# Patient Record
Sex: FEMALE | Race: WHITE | NOT HISPANIC OR LATINO | Employment: UNEMPLOYED | URBAN - METROPOLITAN AREA
[De-identification: names, ages, dates, MRNs, and addresses within clinical notes are randomized per-mention and may not be internally consistent; named-entity substitution may affect disease eponyms.]

---

## 2021-02-03 ENCOUNTER — TELEPHONE (OUTPATIENT)
Dept: FAMILY MEDICINE | Facility: CLINIC | Age: 61
End: 2021-02-03

## 2021-02-03 ENCOUNTER — OFFICE VISIT (OUTPATIENT)
Dept: FAMILY MEDICINE | Facility: CLINIC | Age: 61
End: 2021-02-03

## 2021-02-03 VITALS
TEMPERATURE: 96 F | SYSTOLIC BLOOD PRESSURE: 116 MMHG | WEIGHT: 127.38 LBS | DIASTOLIC BLOOD PRESSURE: 68 MMHG | HEIGHT: 63 IN | HEART RATE: 56 BPM | RESPIRATION RATE: 14 BRPM | OXYGEN SATURATION: 93 % | BODY MASS INDEX: 22.57 KG/M2

## 2021-02-03 DIAGNOSIS — D58.0 SPHEROCYTOSIS, HEREDITARY: ICD-10-CM

## 2021-02-03 DIAGNOSIS — F41.1 GENERALIZED ANXIETY DISORDER: ICD-10-CM

## 2021-02-03 DIAGNOSIS — E11.40 TYPE 2 DIABETES MELLITUS WITH DIABETIC NEUROPATHY, WITHOUT LONG-TERM CURRENT USE OF INSULIN: Primary | ICD-10-CM

## 2021-02-03 LAB
HBA1C MFR BLD: 5 % (ref 4–6)
HBA1C MFR BLD: NORMAL %

## 2021-02-03 PROCEDURE — 83036 HEMOGLOBIN GLYCOSYLATED A1C: CPT | Mod: QW,,, | Performed by: NURSE PRACTITIONER

## 2021-02-03 PROCEDURE — 82570 ASSAY OF URINE CREATININE: CPT

## 2021-02-03 PROCEDURE — 99204 OFFICE O/P NEW MOD 45 MIN: CPT | Mod: S$GLB,,, | Performed by: NURSE PRACTITIONER

## 2021-02-03 PROCEDURE — 82043 UR ALBUMIN QUANTITATIVE: CPT

## 2021-02-03 PROCEDURE — 99204 PR OFFICE/OUTPT VISIT, NEW, LEVL IV, 45-59 MIN: ICD-10-PCS | Mod: S$GLB,,, | Performed by: NURSE PRACTITIONER

## 2021-02-03 PROCEDURE — 83036 POCT HEMOGLOBIN A1C: ICD-10-PCS | Mod: QW,,, | Performed by: NURSE PRACTITIONER

## 2021-02-03 RX ORDER — SERTRALINE HYDROCHLORIDE 50 MG/1
50 TABLET, FILM COATED ORAL DAILY
Qty: 30 TABLET | Refills: 1 | Status: SHIPPED | OUTPATIENT
Start: 2021-02-03 | End: 2021-03-03 | Stop reason: SDUPTHER

## 2021-02-03 RX ORDER — METFORMIN HYDROCHLORIDE EXTENDED-RELEASE TABLETS 500 MG/1
TABLET, FILM COATED, EXTENDED RELEASE ORAL
Qty: 60 TABLET | Refills: 11 | Status: SHIPPED | OUTPATIENT
Start: 2021-02-03 | End: 2021-09-29

## 2021-02-04 ENCOUNTER — TELEPHONE (OUTPATIENT)
Dept: FAMILY MEDICINE | Facility: CLINIC | Age: 61
End: 2021-02-04

## 2021-02-04 LAB
ALBUMIN/CREAT UR: 36.4 UG/MG (ref 0–30)
CREAT UR-MCNC: 110 MG/DL (ref 15–325)
MICROALBUMIN UR DL<=1MG/L-MCNC: 40 UG/ML

## 2021-02-05 ENCOUNTER — LAB VISIT (OUTPATIENT)
Dept: LAB | Facility: CLINIC | Age: 61
End: 2021-02-05

## 2021-02-05 ENCOUNTER — APPOINTMENT (OUTPATIENT)
Dept: LAB | Facility: HOSPITAL | Age: 61
End: 2021-02-05
Attending: NURSE PRACTITIONER

## 2021-02-05 DIAGNOSIS — D58.0 SPHEROCYTOSIS, HEREDITARY: ICD-10-CM

## 2021-02-05 DIAGNOSIS — Z12.11 COLON CANCER SCREENING: ICD-10-CM

## 2021-02-05 LAB
BASOPHILS # BLD AUTO: 0.02 K/UL (ref 0–0.2)
BASOPHILS NFR BLD: 0.4 % (ref 0–1.9)
DIFFERENTIAL METHOD: ABNORMAL
EOSINOPHIL # BLD AUTO: 0 K/UL (ref 0–0.5)
EOSINOPHIL NFR BLD: 0.7 % (ref 0–8)
ERYTHROCYTE [DISTWIDTH] IN BLOOD BY AUTOMATED COUNT: 19.7 % (ref 11.5–14.5)
HCT VFR BLD AUTO: 34.7 % (ref 37–48.5)
HGB BLD-MCNC: 11.5 G/DL (ref 12–16)
IMM GRANULOCYTES # BLD AUTO: 0.04 K/UL (ref 0–0.04)
IMM GRANULOCYTES NFR BLD AUTO: 0.9 % (ref 0–0.5)
LYMPHOCYTES # BLD AUTO: 0.7 K/UL (ref 1–4.8)
LYMPHOCYTES NFR BLD: 14.7 % (ref 18–48)
MCH RBC QN AUTO: 29.4 PG (ref 27–31)
MCHC RBC AUTO-ENTMCNC: 33.1 G/DL (ref 32–36)
MCV RBC AUTO: 89 FL (ref 82–98)
MONOCYTES # BLD AUTO: 0.2 K/UL (ref 0.3–1)
MONOCYTES NFR BLD: 4.8 % (ref 4–15)
NEUTROPHILS # BLD AUTO: 3.6 K/UL (ref 1.8–7.7)
NEUTROPHILS NFR BLD: 78.5 % (ref 38–73)
NRBC BLD-RTO: 0 /100 WBC
PLATELET # BLD AUTO: 210 K/UL (ref 150–350)
PMV BLD AUTO: 11.9 FL (ref 9.2–12.9)
RBC # BLD AUTO: 3.91 M/UL (ref 4–5.4)
WBC # BLD AUTO: 4.56 K/UL (ref 3.9–12.7)

## 2021-02-05 PROCEDURE — 83540 ASSAY OF IRON: CPT

## 2021-02-05 PROCEDURE — 85025 COMPLETE CBC W/AUTO DIFF WBC: CPT

## 2021-02-06 LAB
IRON SERPL-MCNC: 98 UG/DL (ref 30–160)
SATURATED IRON: 37 % (ref 20–50)
TOTAL IRON BINDING CAPACITY: 262 UG/DL (ref 250–450)
TRANSFERRIN SERPL-MCNC: 177 MG/DL (ref 200–375)

## 2021-02-11 DIAGNOSIS — Z12.11 COLON CANCER SCREENING: ICD-10-CM

## 2021-02-15 ENCOUNTER — TELEPHONE (OUTPATIENT)
Dept: FAMILY MEDICINE | Facility: CLINIC | Age: 61
End: 2021-02-15

## 2021-02-24 ENCOUNTER — TELEPHONE (OUTPATIENT)
Dept: FAMILY MEDICINE | Facility: CLINIC | Age: 61
End: 2021-02-24

## 2021-02-25 ENCOUNTER — TELEPHONE (OUTPATIENT)
Dept: FAMILY MEDICINE | Facility: CLINIC | Age: 61
End: 2021-02-25

## 2021-03-03 ENCOUNTER — OFFICE VISIT (OUTPATIENT)
Dept: FAMILY MEDICINE | Facility: CLINIC | Age: 61
End: 2021-03-03

## 2021-03-03 VITALS
OXYGEN SATURATION: 98 % | WEIGHT: 123.19 LBS | TEMPERATURE: 98 F | HEIGHT: 64 IN | SYSTOLIC BLOOD PRESSURE: 140 MMHG | DIASTOLIC BLOOD PRESSURE: 82 MMHG | BODY MASS INDEX: 21.03 KG/M2 | HEART RATE: 117 BPM

## 2021-03-03 DIAGNOSIS — E11.40 TYPE 2 DIABETES MELLITUS WITH DIABETIC NEUROPATHY, WITHOUT LONG-TERM CURRENT USE OF INSULIN: Primary | ICD-10-CM

## 2021-03-03 DIAGNOSIS — F41.1 GENERALIZED ANXIETY DISORDER: ICD-10-CM

## 2021-03-03 LAB
CHOLEST SERPL-MSCNC: 214 MG/DL (ref 0–200)
HBA1C MFR BLD: 4 % (ref 4–6)
HDLC SERPL-MCNC: 45 MG/DL
LDLC SERPL CALC-MCNC: 142 MG/DL (ref 0–160)
MICROALBUMIN/CREATININE RATIO: 24 UG/MG
TRIGLYCERIDE (LIPID PAN): 143

## 2021-03-03 PROCEDURE — 99214 OFFICE O/P EST MOD 30 MIN: CPT | Mod: S$GLB,,, | Performed by: NURSE PRACTITIONER

## 2021-03-03 PROCEDURE — 99214 PR OFFICE/OUTPT VISIT, EST, LEVL IV, 30-39 MIN: ICD-10-PCS | Mod: S$GLB,,, | Performed by: NURSE PRACTITIONER

## 2021-03-03 RX ORDER — SERTRALINE HYDROCHLORIDE 100 MG/1
100 TABLET, FILM COATED ORAL DAILY
Qty: 30 TABLET | Refills: 5 | Status: SHIPPED | OUTPATIENT
Start: 2021-03-03 | End: 2021-05-24 | Stop reason: SDUPTHER

## 2021-03-16 ENCOUNTER — PATIENT OUTREACH (OUTPATIENT)
Dept: ADMINISTRATIVE | Facility: HOSPITAL | Age: 61
End: 2021-03-16

## 2021-03-29 ENCOUNTER — TELEPHONE (OUTPATIENT)
Dept: FAMILY MEDICINE | Facility: CLINIC | Age: 61
End: 2021-03-29

## 2021-03-29 DIAGNOSIS — R14.0 ABDOMINAL DISTENTION: Primary | ICD-10-CM

## 2021-03-30 ENCOUNTER — TELEPHONE (OUTPATIENT)
Dept: FAMILY MEDICINE | Facility: CLINIC | Age: 61
End: 2021-03-30

## 2021-04-01 DIAGNOSIS — Z12.11 COLON CANCER SCREENING: ICD-10-CM

## 2021-04-05 ENCOUNTER — TELEPHONE (OUTPATIENT)
Dept: FAMILY MEDICINE | Facility: CLINIC | Age: 61
End: 2021-04-05

## 2021-05-24 ENCOUNTER — TELEPHONE (OUTPATIENT)
Dept: FAMILY MEDICINE | Facility: CLINIC | Age: 61
End: 2021-05-24

## 2021-05-24 DIAGNOSIS — F41.1 GENERALIZED ANXIETY DISORDER: ICD-10-CM

## 2021-05-24 RX ORDER — SERTRALINE HYDROCHLORIDE 50 MG/1
TABLET, FILM COATED ORAL
Qty: 30 TABLET | Refills: 0 | Status: SHIPPED | OUTPATIENT
Start: 2021-05-24 | End: 2021-09-29

## 2021-05-25 ENCOUNTER — TELEPHONE (OUTPATIENT)
Dept: FAMILY MEDICINE | Facility: CLINIC | Age: 61
End: 2021-05-25

## 2021-08-16 ENCOUNTER — PATIENT OUTREACH (OUTPATIENT)
Dept: ADMINISTRATIVE | Facility: HOSPITAL | Age: 61
End: 2021-08-16

## 2021-09-27 DIAGNOSIS — F41.1 GENERALIZED ANXIETY DISORDER: ICD-10-CM

## 2021-09-28 RX ORDER — SERTRALINE HYDROCHLORIDE 50 MG/1
50 TABLET, FILM COATED ORAL DAILY
Qty: 30 TABLET | Refills: 0 | OUTPATIENT
Start: 2021-09-28

## 2021-09-29 ENCOUNTER — OFFICE VISIT (OUTPATIENT)
Dept: FAMILY MEDICINE | Facility: CLINIC | Age: 61
End: 2021-09-29

## 2021-09-29 ENCOUNTER — TELEPHONE (OUTPATIENT)
Dept: FAMILY MEDICINE | Facility: CLINIC | Age: 61
End: 2021-09-29

## 2021-09-29 DIAGNOSIS — F41.1 GENERALIZED ANXIETY DISORDER: ICD-10-CM

## 2021-09-29 DIAGNOSIS — Z79.4 TYPE 2 DIABETES MELLITUS WITH DIABETIC NEUROPATHY, WITH LONG-TERM CURRENT USE OF INSULIN: ICD-10-CM

## 2021-09-29 DIAGNOSIS — E11.40 TYPE 2 DIABETES MELLITUS WITH DIABETIC NEUROPATHY, WITH LONG-TERM CURRENT USE OF INSULIN: ICD-10-CM

## 2021-09-29 PROCEDURE — 99213 OFFICE O/P EST LOW 20 MIN: CPT | Mod: 95,,, | Performed by: STUDENT IN AN ORGANIZED HEALTH CARE EDUCATION/TRAINING PROGRAM

## 2021-09-29 PROCEDURE — 99213 PR OFFICE/OUTPT VISIT, EST, LEVL III, 20-29 MIN: ICD-10-PCS | Mod: 95,,, | Performed by: STUDENT IN AN ORGANIZED HEALTH CARE EDUCATION/TRAINING PROGRAM

## 2021-09-29 RX ORDER — INSULIN ASPART 100 [IU]/ML
2 INJECTION, SOLUTION INTRAVENOUS; SUBCUTANEOUS 2 TIMES DAILY WITH MEALS
COMMUNITY
Start: 2021-04-23

## 2021-09-29 RX ORDER — INSULIN DEGLUDEC 100 U/ML
14 INJECTION, SOLUTION SUBCUTANEOUS NIGHTLY
COMMUNITY
Start: 2021-08-24

## 2021-09-29 RX ORDER — SERTRALINE HYDROCHLORIDE 100 MG/1
TABLET, FILM COATED ORAL
Qty: 90 TABLET | Refills: 3 | Status: SHIPPED | OUTPATIENT
Start: 2021-09-29 | End: 2021-09-30 | Stop reason: SDUPTHER

## 2021-09-29 RX ORDER — METFORMIN HYDROCHLORIDE EXTENDED-RELEASE TABLETS 500 MG/1
TABLET, FILM COATED, EXTENDED RELEASE ORAL
Start: 2021-09-29 | End: 2021-12-27 | Stop reason: SDUPTHER

## 2021-09-30 ENCOUNTER — TELEPHONE (OUTPATIENT)
Dept: FAMILY MEDICINE | Facility: CLINIC | Age: 61
End: 2021-09-30

## 2021-09-30 DIAGNOSIS — F41.1 GENERALIZED ANXIETY DISORDER: ICD-10-CM

## 2021-09-30 RX ORDER — SERTRALINE HYDROCHLORIDE 100 MG/1
100 TABLET, FILM COATED ORAL DAILY
Qty: 90 TABLET | Refills: 3 | Status: SHIPPED | OUTPATIENT
Start: 2021-09-30 | End: 2022-01-13 | Stop reason: SDUPTHER

## 2021-10-07 ENCOUNTER — PATIENT MESSAGE (OUTPATIENT)
Dept: ADMINISTRATIVE | Facility: HOSPITAL | Age: 61
End: 2021-10-07

## 2021-10-07 DIAGNOSIS — E11.9 TYPE 2 DIABETES MELLITUS WITHOUT COMPLICATION: ICD-10-CM

## 2021-10-07 DIAGNOSIS — E11.9 TYPE 2 DIABETES MELLITUS WITHOUT COMPLICATION, UNSPECIFIED WHETHER LONG TERM INSULIN USE: ICD-10-CM

## 2021-12-27 DIAGNOSIS — E11.40 TYPE 2 DIABETES MELLITUS WITH DIABETIC NEUROPATHY, WITH LONG-TERM CURRENT USE OF INSULIN: ICD-10-CM

## 2021-12-27 DIAGNOSIS — Z79.4 TYPE 2 DIABETES MELLITUS WITH DIABETIC NEUROPATHY, WITH LONG-TERM CURRENT USE OF INSULIN: ICD-10-CM

## 2021-12-28 ENCOUNTER — TELEPHONE (OUTPATIENT)
Dept: FAMILY MEDICINE | Facility: CLINIC | Age: 61
End: 2021-12-28

## 2021-12-28 RX ORDER — METFORMIN HYDROCHLORIDE EXTENDED-RELEASE TABLETS 500 MG/1
500 TABLET, FILM COATED, EXTENDED RELEASE ORAL 2 TIMES DAILY
Qty: 60 TABLET | Refills: 5 | Status: SHIPPED | OUTPATIENT
Start: 2021-12-28

## 2021-12-29 ENCOUNTER — PATIENT MESSAGE (OUTPATIENT)
Dept: ADMINISTRATIVE | Facility: HOSPITAL | Age: 61
End: 2021-12-29

## 2021-12-29 ENCOUNTER — PATIENT OUTREACH (OUTPATIENT)
Dept: ADMINISTRATIVE | Facility: HOSPITAL | Age: 61
End: 2021-12-29

## 2022-01-06 ENCOUNTER — PATIENT OUTREACH (OUTPATIENT)
Dept: ADMINISTRATIVE | Facility: HOSPITAL | Age: 62
End: 2022-01-06

## 2022-01-06 NOTE — PROGRESS NOTES
"Attempted to outreach patient for diabetic eye exam via "[employer]", no answer after a week. Sending outreach via Mail Out Letter now.  "

## 2022-01-06 NOTE — LETTER
January 6, 2022    Nell Smith  [address] [place] [address]             [facility]  [address]  [facility] [address]  Phone: [phone] Hi,  I hope you are well. According to our records you are due for a diabetic eye exam. If you have had this done elsewhere, will you please let us know so that we may request a copy of your report and update your record here at [facility]. If you have not, will you please give us a call at [phone] and we will be happy to help you get this scheduled.      Thanks and have a good day!      Please don't hesitate to call with questions or concerns.     Eva Childress LPN CCC [facility] [place] [address] [place] [address]  [phone] (phone)  [phone] (fax)

## 2022-01-13 DIAGNOSIS — F41.1 GENERALIZED ANXIETY DISORDER: ICD-10-CM

## 2022-01-13 RX ORDER — SERTRALINE HYDROCHLORIDE 100 MG/1
100 TABLET, FILM COATED ORAL DAILY
Qty: 90 TABLET | Refills: 3 | Status: SHIPPED | OUTPATIENT
Start: 2022-01-13 | End: 2022-11-16

## 2022-01-24 ENCOUNTER — TELEPHONE (OUTPATIENT)
Dept: FAMILY MEDICINE | Facility: CLINIC | Age: 62
End: 2022-01-24

## 2022-01-24 NOTE — TELEPHONE ENCOUNTER
----- Message from Ela Dumont sent at 1/24/2022  3:38 PM CST -----  Contact: pt  Type:  RX Refill Request    Who Called:  pt    Refill or New Rx:  refill    RX Name and Strength:  sertraline (ZOLOFT) 50 MG tablet    How is the patient currently taking it? (ex. 1XDay):  As Directed  Is this a 30 day or 90 day RX:  30      Preferred Pharmacy with phone number:    [id number] - [place], [place] - [address] AT [facility] [address]  [address]  [place] [address]  Phone: [phone] Fax: [phone]    Local or Mail Order:  local    Ordering Provider:  Marcia Colin Call Back Number:  [phone]    Additional Information:Pt states she typically get 100mg would prefer 50. She's trying to wean herself off      Please contact pt upon completion-Thank you~

## 2022-01-24 NOTE — TELEPHONE ENCOUNTER
Consulted with Dr. Kennedy, who feels it would be best for patient to at least complete virtual visit with a provider to further discuss this request.     Called patient to discuss - no answer; VM full. Will reach out via [employer] as well.

## 2022-02-16 DIAGNOSIS — Z12.31 OTHER SCREENING MAMMOGRAM: ICD-10-CM

## 2022-03-16 DIAGNOSIS — E11.9 TYPE 2 DIABETES MELLITUS WITHOUT COMPLICATION: ICD-10-CM

## 2022-03-21 ENCOUNTER — PATIENT MESSAGE (OUTPATIENT)
Dept: ADMINISTRATIVE | Facility: HOSPITAL | Age: 62
End: 2022-03-21

## 2022-05-08 LAB
ALBUMIN CREATININE RATIO: 17 MG/G
ALBUMIN, URINE: 1.5 MG/L
CREATININE, URINE: 86 MG/DL

## 2022-05-11 DIAGNOSIS — E11.9 TYPE 2 DIABETES MELLITUS WITHOUT COMPLICATION: ICD-10-CM

## 2022-05-16 ENCOUNTER — PATIENT MESSAGE (OUTPATIENT)
Dept: ADMINISTRATIVE | Facility: HOSPITAL | Age: 62
End: 2022-05-16

## 2022-05-31 ENCOUNTER — PATIENT MESSAGE (OUTPATIENT)
Dept: ADMINISTRATIVE | Facility: HOSPITAL | Age: 62
End: 2022-05-31

## 2022-06-10 ENCOUNTER — TELEPHONE (OUTPATIENT)
Dept: FAMILY MEDICINE | Facility: CLINIC | Age: 62
End: 2022-06-10

## 2022-07-22 ENCOUNTER — PATIENT OUTREACH (OUTPATIENT)
Dept: ADMINISTRATIVE | Facility: HOSPITAL | Age: 62
End: 2022-07-22

## 2022-07-22 NOTE — PROGRESS NOTES
"A1C gap report review.    Spoke to patient regarding overdue HM    Health Maintenance Due   Topic Date Due    Hepatitis C Screening  Never done    Cervical Cancer Screening  Never done    COVID-19 Vaccine (1) Never done    Pneumococcal Vaccines (Age 0-64) (1 - PCV) Never done    Foot Exam  Never done    Eye Exam  Never done    HIV Screening  Never done    TETANUS VACCINE  Never done    Low Dose Statin  Never done    Shingles Vaccine (1 of 2) Never done    Hemoglobin A1c  09/03/2021    Mammogram  02/03/2022    Diabetes Urine Screening  03/03/2022    Lipid Panel  03/03/2022     Patient states not interested in HM items and declined scheduling. Patient states " I see doctors in [place]. States "saw NP Rob with [facility] who is no longer there" and "saw Dr. Kennedy once on skype". When asked patient did not clarify if no longer an [facility] patient. Patient was pleasant and thanked me for calling prior to call ending.   "

## 2022-07-27 DIAGNOSIS — Z12.11 COLON CANCER SCREENING: ICD-10-CM

## 2022-08-01 ENCOUNTER — PATIENT MESSAGE (OUTPATIENT)
Dept: ADMINISTRATIVE | Facility: HOSPITAL | Age: 62
End: 2022-08-01

## 2022-08-17 DIAGNOSIS — E11.40 TYPE 2 DIABETES MELLITUS WITH DIABETIC NEUROPATHY, WITH LONG-TERM CURRENT USE OF INSULIN: ICD-10-CM

## 2022-08-17 DIAGNOSIS — Z79.4 TYPE 2 DIABETES MELLITUS WITH DIABETIC NEUROPATHY, WITH LONG-TERM CURRENT USE OF INSULIN: ICD-10-CM

## 2022-08-22 ENCOUNTER — PATIENT MESSAGE (OUTPATIENT)
Dept: ADMINISTRATIVE | Facility: HOSPITAL | Age: 62
End: 2022-08-22

## 2022-08-24 ENCOUNTER — PATIENT MESSAGE (OUTPATIENT)
Dept: ADMINISTRATIVE | Facility: HOSPITAL | Age: 62
End: 2022-08-24

## 2022-08-25 LAB
CHOLEST SERPL-MSCNC: 179 MG/DL (ref 0–200)
HBA1C MFR BLD: 4 % (ref 4–6)
HDLC SERPL-MCNC: 54 MG/DL
LDLC SERPL CALC-MCNC: 105 MG/DL
TRIGL SERPL-MCNC: 100 MG/DL

## 2022-08-31 DIAGNOSIS — Z11.59 NEED FOR HEPATITIS C SCREENING TEST: ICD-10-CM

## 2022-09-15 ENCOUNTER — PATIENT MESSAGE (OUTPATIENT)
Dept: ADMINISTRATIVE | Facility: HOSPITAL | Age: 62
End: 2022-09-15

## 2022-09-29 ENCOUNTER — PATIENT OUTREACH (OUTPATIENT)
Dept: ADMINISTRATIVE | Facility: HOSPITAL | Age: 62
End: 2022-09-29

## 2022-09-29 NOTE — PROGRESS NOTES
Working A1c report for [place], chart reviewed at this time.  Population Health Outreach.  Spoke with pt about DM care & annual PCP visit. She stated that she doesn't really use [facility] of PCP needs, only wanted a prescription filled. Very reluctant to give up endocrinologist name and refuse to give up any other information. She stated that Dr Rosa takes care of her DM needs.     Records Received, hyper-linked into chart at this time.   The following record(s)  below were uploaded for Health Maintenance .    08/25/2022  HEMOGLOBIN A1c  LIPID PANEL    05/08/2022  MICROALBUMIN

## 2022-10-11 ENCOUNTER — PATIENT MESSAGE (OUTPATIENT)
Dept: ADMINISTRATIVE | Facility: HOSPITAL | Age: 62
End: 2022-10-11

## 2022-10-17 ENCOUNTER — PATIENT MESSAGE (OUTPATIENT)
Dept: ADMINISTRATIVE | Facility: HOSPITAL | Age: 62
End: 2022-10-17

## 2022-10-31 ENCOUNTER — PATIENT MESSAGE (OUTPATIENT)
Dept: FAMILY MEDICINE | Facility: CLINIC | Age: 62
End: 2022-10-31

## 2022-11-16 ENCOUNTER — OFFICE VISIT (OUTPATIENT)
Dept: FAMILY MEDICINE | Facility: CLINIC | Age: 62
End: 2022-11-16

## 2022-11-16 DIAGNOSIS — F41.1 GENERALIZED ANXIETY DISORDER: Primary | ICD-10-CM

## 2022-11-16 DIAGNOSIS — F33.1 MODERATE EPISODE OF RECURRENT MAJOR DEPRESSIVE DISORDER: ICD-10-CM

## 2022-11-16 DIAGNOSIS — F43.10 PTSD (POST-TRAUMATIC STRESS DISORDER): ICD-10-CM

## 2022-11-16 PROCEDURE — 99213 OFFICE O/P EST LOW 20 MIN: CPT | Mod: 95,,, | Performed by: STUDENT IN AN ORGANIZED HEALTH CARE EDUCATION/TRAINING PROGRAM

## 2022-11-16 PROCEDURE — 99213 PR OFFICE/OUTPT VISIT, EST, LEVL III, 20-29 MIN: ICD-10-PCS | Mod: 95,,, | Performed by: STUDENT IN AN ORGANIZED HEALTH CARE EDUCATION/TRAINING PROGRAM

## 2022-11-16 RX ORDER — BUPROPION HYDROCHLORIDE 75 MG/1
TABLET ORAL
Qty: 67 TABLET | Refills: 0 | Status: SHIPPED | OUTPATIENT
Start: 2022-11-16 | End: 2024-04-03

## 2022-11-16 NOTE — PATIENT INSTRUCTIONS

## 2022-11-16 NOTE — PROGRESS NOTES
The patient location is: [facility]  The chief complaint leading to consultation is: Anxiety, depression    Visit type: audiovisual    Face to Face time with patient: 17 mins  18 minutes of total time spent on the encounter, which includes face to face time and non-face to face time preparing to see the patient (eg, review of tests), Obtaining and/or reviewing separately obtained history, Documenting clinical information in the electronic or other health record, Independently interpreting results (not separately reported) and communicating results to the patient/family/caregiver, or Care coordination (not separately reported).     Each patient to whom he or she provides medical services by telemedicine is:  (1) informed of the relationship between the physician and patient and the respective role of any other health care provider with respect to management of the patient; and (2) notified that he or she may decline to receive medical services by telemedicine and may withdraw from such care at any time.    Subjective:       Patient ID: Nell Smith is a 61 y.o. female.    Chief Complaint: Depression      Anxiety/Depression/PTSD-  2 years of worsening anxiety, fear and feeling post traumatic  She is fearful of being trapped in the home.  She feels fearful in the home because she feels triggered at home  They are painting in the home to help make it look different in hopes of less triggering  She is not getting any results from zoloft and has stopped that in Jan 2022.  She is taking levium, she is taking serotonin supplements, she is taking cbd gummies.  She is getting off of her levium and her serotonin supplements to see what her wellbutrin will do by itself.  She plans to continue cbd gummies.     Review of Systems   Constitutional:  Negative for activity change and unexpected weight change.   HENT:  Negative for hearing loss, rhinorrhea and trouble swallowing.    Eyes:  Negative for discharge and visual  disturbance.   Respiratory:  Negative for chest tightness and wheezing.    Cardiovascular:  Negative for chest pain and palpitations.   Gastrointestinal:  Negative for blood in stool, constipation, diarrhea and vomiting.   Endocrine: Negative for polydipsia and polyuria.   Genitourinary:  Negative for difficulty urinating, dysuria, hematuria and menstrual problem.   Musculoskeletal:  Negative for arthralgias, joint swelling and neck pain.   Neurological:  Negative for weakness and headaches.   Psychiatric/Behavioral:  Positive for dysphoric mood and sleep disturbance. Negative for confusion. The patient is nervous/anxious.        Objective:      Physical Exam  Constitutional:       General: She is not in acute distress.     Appearance: Normal appearance. She is not ill-appearing.   Pulmonary:      Effort: Pulmonary effort is normal. No respiratory distress.   Neurological:      Mental Status: She is alert.   Psychiatric:         Mood and Affect: Mood normal.         Behavior: Behavior normal.         Thought Content: Thought content normal.         Judgment: Judgment normal.       Assessment:       1. Generalized anxiety disorder    2. Moderate episode of recurrent major depressive disorder    3. PTSD (post-traumatic stress disorder)        Plan:       Problem List Items Addressed This Visit          Psychiatric    Generalized anxiety disorder - Primary     Gets some flairs and has some ptsd components  She has gotten off of the zoloft because it didn't help  She would like to try wellbutrin.          Relevant Medications    buPROPion (WELLBUTRIN) 75 MG tablet     Other Visit Diagnoses       Moderate episode of recurrent major depressive disorder        Relevant Medications    buPROPion (WELLBUTRIN) 75 MG tablet    PTSD (post-traumatic stress disorder)        Relevant Medications    buPROPion (WELLBUTRIN) 75 MG tablet

## 2022-11-16 NOTE — ASSESSMENT & PLAN NOTE
Gets some flairs and has some ptsd components  She has gotten off of the zoloft because it didn't help  She would like to try wellbutrin.

## 2022-12-12 ENCOUNTER — TELEPHONE (OUTPATIENT)
Dept: FAMILY MEDICINE | Facility: CLINIC | Age: 62
End: 2022-12-12

## 2022-12-12 DIAGNOSIS — F41.1 GENERALIZED ANXIETY DISORDER: Primary | ICD-10-CM

## 2022-12-12 NOTE — TELEPHONE ENCOUNTER
----- Message from Rene Corley sent at 12/12/2022 12:35 PM CST -----  Contact: Self  Type: Needs Medical Advice  Who Called:  Patient    Best Call Back Number: [phone]  Additional Information:  Called to speak with office regarding buPROPion (WELLBUTRIN) 75 MG tablet. States it is affecting blood sugar, would like to discuss an alternative or getting off of above.

## 2022-12-12 NOTE — TELEPHONE ENCOUNTER
States she took it in the past, it did well for her but then took it about a year ago and it did not help

## 2022-12-12 NOTE — TELEPHONE ENCOUNTER
Spoke with patient. Patient states   She took wellbutrin 75 mg for 10 days  Constipation after a few days.   Increase in Blood sugar.  Per patient in the past has taken  Zoloft before and did not cause these issues.

## 2022-12-13 RX ORDER — SERTRALINE HYDROCHLORIDE 25 MG/1
25 TABLET, FILM COATED ORAL DAILY
Qty: 30 TABLET | Refills: 11 | Status: SHIPPED | OUTPATIENT
Start: 2022-12-13 | End: 2022-12-13 | Stop reason: SDUPTHER

## 2022-12-13 NOTE — TELEPHONE ENCOUNTER
Spoke with Nell. Nell informed to take Wellbutrin every other day for 5 days then stop  Once Wellbutrin is stopped, she may start Zoloft. Pt voiced understanding.

## 2022-12-13 NOTE — TELEPHONE ENCOUNTER
Spoke with patient. Pt would like the low dose of Zoloft sent in.   Pt would like to know if there is a weaning phase for Wellbutrin.

## 2023-01-09 ENCOUNTER — PATIENT MESSAGE (OUTPATIENT)
Dept: ADMINISTRATIVE | Facility: HOSPITAL | Age: 63
End: 2023-01-09

## 2023-01-10 ENCOUNTER — PATIENT MESSAGE (OUTPATIENT)
Dept: ADMINISTRATIVE | Facility: HOSPITAL | Age: 63
End: 2023-01-10

## 2023-01-17 ENCOUNTER — PATIENT MESSAGE (OUTPATIENT)
Dept: ADMINISTRATIVE | Facility: HOSPITAL | Age: 63
End: 2023-01-17

## 2023-02-09 LAB
CHOLEST SERPL-MSCNC: 162 MG/DL (ref 0–200)
HDLC SERPL-MCNC: 47 MG/DL
LDLC SERPL CALC-MCNC: 88 MG/DL
TRIGL SERPL-MCNC: 176 MG/DL

## 2023-02-24 ENCOUNTER — PATIENT MESSAGE (OUTPATIENT)
Dept: ADMINISTRATIVE | Facility: HOSPITAL | Age: 63
End: 2023-02-24

## 2023-03-01 ENCOUNTER — TELEPHONE (OUTPATIENT)
Dept: FAMILY MEDICINE | Facility: CLINIC | Age: 63
End: 2023-03-01

## 2023-03-01 NOTE — TELEPHONE ENCOUNTER
----- Message from Val Rodrigues sent at 3/1/2023 10:17 AM CST -----  Contact: pt  Type: Needs Medical Advice         Who Called: pt  Best Call Back Number:[phone]  Additional Information: Requesting a call back regarding t is needing office to call her. Pt is upset that her rx keeps getting called in incorrectly and she said it is coasting her money.   Please Advise- Thank you

## 2023-03-01 NOTE — TELEPHONE ENCOUNTER
Attempted to contact Nell Smith to discuss  RX .    Left voice mail to return our call at [phone] on [phone]    My Grey LPN

## 2023-03-06 ENCOUNTER — TELEPHONE (OUTPATIENT)
Dept: FAMILY MEDICINE | Facility: CLINIC | Age: 63
End: 2023-03-06

## 2023-03-06 DIAGNOSIS — F41.1 GENERALIZED ANXIETY DISORDER: Primary | ICD-10-CM

## 2023-03-06 RX ORDER — SERTRALINE HYDROCHLORIDE 100 MG/1
100 TABLET, FILM COATED ORAL DAILY
Qty: 90 TABLET | Refills: 3 | Status: SHIPPED | OUTPATIENT
Start: 2023-03-06 | End: 2024-03-05

## 2023-03-06 NOTE — TELEPHONE ENCOUNTER
----- Message from Mora Quinteros sent at 3/6/2023  9:09 AM CST -----  Contact: patient  Type: Needs Medical Advice  Who Called:  patient  Best Call Back Number: [phone]   Additional Information: patient requesting a call back regarding prescription- medication was supposed to be upped sertraline (ZOLOFT) 50 MG tablet supposed to be upped to 100mg - please advise

## 2023-03-06 NOTE — TELEPHONE ENCOUNTER
"Per patient, "medication was supposed to be upped from sertraline (ZOLOFT) 50 MG tablet to 100mg" Please advise as last RX sent in as 50 mg once daily.   "

## 2023-03-07 ENCOUNTER — PATIENT MESSAGE (OUTPATIENT)
Dept: ADMINISTRATIVE | Facility: HOSPITAL | Age: 63
End: 2023-03-07

## 2023-03-20 ENCOUNTER — PATIENT OUTREACH (OUTPATIENT)
Dept: ADMINISTRATIVE | Facility: HOSPITAL | Age: 63
End: 2023-03-20

## 2023-03-20 NOTE — PROGRESS NOTES
Population Health chart review. Working a non-compliant Cervical Cancer Screening report. Records Received, hyper-linked into chart at this time.  The following record(s)  below were uploaded for Health Maintenance . Population Health Outreach.    02/2023  LIPID PANEL

## 2023-04-11 ENCOUNTER — PATIENT MESSAGE (OUTPATIENT)
Dept: ADMINISTRATIVE | Facility: HOSPITAL | Age: 63
End: 2023-04-11

## 2023-06-27 ENCOUNTER — PATIENT MESSAGE (OUTPATIENT)
Dept: FAMILY MEDICINE | Facility: CLINIC | Age: 63
End: 2023-06-27

## 2023-07-03 ENCOUNTER — PATIENT OUTREACH (OUTPATIENT)
Dept: ADMINISTRATIVE | Facility: HOSPITAL | Age: 63
End: 2023-07-03

## 2023-07-03 ENCOUNTER — PATIENT MESSAGE (OUTPATIENT)
Dept: ADMINISTRATIVE | Facility: HOSPITAL | Age: 63
End: 2023-07-03

## 2023-07-03 NOTE — PROGRESS NOTES
Population Health Chart Review & Patient Outreach Details:     Reason for Outreach Encounter:     [x]  Non-Compliant Report   []  Payor Report ([employer], PHN, BCBS, MSSP, MCIP, C, etc.)   []  Pre-Visit Chart Review     Updates Requested / Reviewed:     [x]  Care Everywhere    [x]     [x]  External Sources ([facility], [facility], DIS, etc.)   []  Care Team Updated    Patient Outreach Method:    [x]  Telephone Outreach Completed   [] Successful   [x] Left Voicemail   [] Unable to Contact (wrong number, no voicemail)  [x]  [facility] Outreach Sent  []  Letter Outreach Mailed  []  Fax Sent for External Records  []  External Records Upload    Health Maintenance Topics Addressed and Outreach Outcomes / Actions Taken:        []      Breast Cancer Screening []  Mammo Scheduled      []  External Records Requested     []  Added Reminder to Complete to Upcoming Primary Care Appt Notes     []  Patient Declined     []  Patient Will Call Back to Schedule     []  Patient Will Schedule with External Provider / Order Routed if Applicable             [x]       Cervical Cancer Screening []  Pap Scheduled      []  External Records Requested     []  Added Reminder to Complete to Upcoming Primary Care Appt Notes     []  Patient Declined     []  Patient Will Call Back to Schedule     []  Patient Will Schedule with External Provider               []          Colorectal Cancer Screening []  Colonoscopy Case Request or Referral Placed     []  External Records Requested     []  Added Reminder to Complete to Upcoming Primary Care Appt Notes     []  Patient Declined     []  Patient Will Call Back to Schedule     []  Patient Will Schedule with External Provider     []  Fit Kit Mailed (add the SmartPhrase under additional notes)     []  Reminded Patient to Complete Home Test             []      Diabetic Eye Exam []  Eye Camera Scheduled or Optometry Referral Placed     []  External Records Requested     []  Added Reminder to Complete  to Upcoming Primary Care Appt Notes     []  Patient Declined     []  Patient Will Call Back to Schedule     []  Patient Will Schedule with External Provider             []      Blood Pressure Control []  Primary Care Follow Up Visit Scheduled     []  Remote Blood Pressure Reading Captured     []  Added Reminder to Complete to Upcoming Primary Care Appt Notes     []  Patient Declined     []  Patient Will Call Back / Patient Will Send Portal Message with Reading     []  Patient Will Call Back to Schedule Provider Visit             []       HbA1c & Other Labs []  Lab Appt Scheduled for Due Labs     []  Primary Care Follow Up Visit Scheduled      []  Reminded Patient to Complete Home Test     []  Added Reminder to Complete to Upcoming Primary Care Appt Notes     []  Patient Declined     []  Patient Will Call Back to Schedule     []  Patient Will Schedule with External Provider / Order Routed if Applicable           []    Schedule Primary Care Appt []  Primary Care Appt Scheduled     []  Patient Declined     []  Patient Will Call Back to Schedule     []  Pt Established with External Provider & Updated Care Team             []      Medication Adherence []  Primary Care Appointment Scheduled     []  Added Reminder to Upcoming Primary Care Appt Notes     []  Patient Reminded to  Prescription     []  Patient Declined, Provider Notified if Needed     []  Sent Provider Message to Review and/or Add Exclusion to Problem List             []      Osteoporosis Screening []  DXA Appointment Scheduled     []  External Records Requested     []  Added Reminder to Complete to Upcoming Primary Care Appt Notes     []  Patient Declined     []  Patient Will Call Back to Schedule     []  Patient Will Schedule with External Provider / Order Routed if Applicable     Additional Care Coordinator Notes:         Further Action Needed If Patient Returns Outreach:

## 2023-09-07 ENCOUNTER — PATIENT MESSAGE (OUTPATIENT)
Dept: FAMILY MEDICINE | Facility: CLINIC | Age: 63
End: 2023-09-07

## 2023-11-20 NOTE — TELEPHONE ENCOUNTER
----- Message from Ela Dumont sent at 1/13/2022  9:57 AM CST -----  Contact: pt  Type:  RX Refill Request    Who Called:  pt    Refill or New Rx:  refill    RX Name and Strength:  sertraline (ZOLOFT) 50 MG tablet    How is the patient currently taking it? (ex. 1XDay):  As Directed  Is this a 30 day or 90 day RX:  30    Preferred Pharmacy with phone number:    [facility] #[id number] - [place], [place] - [address] AT [facility] [address]  [address]  [place] [address]  Phone: [phone] Fax: [phone]    Local or Mail Order:  local    Ordering Provider:  Marcia Colin Call Back Number:  [phone]    Additional Information:  pt would like to wean off of Rx. Plans on breaking in half to lower dosage    Please contact pt upon completion-Thank you~        
LOV 9/29/21  NOV none noted      Patient states that she would like to wean off medication and plans to break them in half.  
none

## 2024-02-28 DIAGNOSIS — Z12.31 OTHER SCREENING MAMMOGRAM: ICD-10-CM

## 2024-04-03 ENCOUNTER — HOSPITAL ENCOUNTER (OUTPATIENT)
Facility: HOSPITAL | Age: 64
Discharge: HOME | End: 2024-04-04
Attending: EMERGENCY MEDICINE | Admitting: STUDENT IN AN ORGANIZED HEALTH CARE EDUCATION/TRAINING PROGRAM
Payer: MEDICAID

## 2024-04-03 DIAGNOSIS — R29.818 ACUTE FOCAL NEUROLOGICAL DEFICIT: ICD-10-CM

## 2024-04-03 DIAGNOSIS — R47.1 DYSARTHRIA: ICD-10-CM

## 2024-04-03 DIAGNOSIS — R07.9 CHEST PAIN: ICD-10-CM

## 2024-04-03 DIAGNOSIS — R20.2 PARESTHESIAS: Primary | ICD-10-CM

## 2024-04-03 LAB
ALBUMIN SERPL BCP-MCNC: 4.9 G/DL (ref 3.5–5.2)
ALP SERPL-CCNC: 70 U/L (ref 55–135)
ALT SERPL W/O P-5'-P-CCNC: 9 U/L (ref 10–44)
ANION GAP SERPL CALC-SCNC: 10 MMOL/L (ref 8–16)
AST SERPL-CCNC: 17 U/L (ref 10–40)
BASOPHILS # BLD AUTO: 0.02 K/UL (ref 0–0.2)
BASOPHILS NFR BLD: 0.3 % (ref 0–1.9)
BILIRUB SERPL-MCNC: 1 MG/DL (ref 0.1–1)
BUN SERPL-MCNC: 22 MG/DL (ref 8–23)
CALCIUM SERPL-MCNC: 9.8 MG/DL (ref 8.7–10.5)
CHLORIDE SERPL-SCNC: 106 MMOL/L (ref 95–110)
CHOLEST SERPL-MCNC: 186 MG/DL (ref 120–199)
CHOLEST/HDLC SERPL: 4.4 {RATIO} (ref 2–5)
CO2 SERPL-SCNC: 20 MMOL/L (ref 23–29)
CREAT SERPL-MCNC: 0.8 MG/DL (ref 0.5–1.4)
DIFFERENTIAL METHOD BLD: ABNORMAL
EOSINOPHIL # BLD AUTO: 0.1 K/UL (ref 0–0.5)
EOSINOPHIL NFR BLD: 1.7 % (ref 0–8)
ERYTHROCYTE [DISTWIDTH] IN BLOOD BY AUTOMATED COUNT: 18.6 % (ref 11.5–14.5)
EST. GFR  (NO RACE VARIABLE): >60 ML/MIN/1.73 M^2
FOLATE SERPL-MCNC: 5.9 NG/ML (ref 4–24)
GLUCOSE SERPL-MCNC: 130 MG/DL (ref 70–110)
GLUCOSE SERPL-MCNC: 141 MG/DL (ref 70–110)
HCT VFR BLD AUTO: 30.8 % (ref 37–48.5)
HDLC SERPL-MCNC: 42 MG/DL (ref 40–75)
HDLC SERPL: 22.6 % (ref 20–50)
HGB BLD-MCNC: 10.4 G/DL (ref 12–16)
IMM GRANULOCYTES # BLD AUTO: 0.01 K/UL (ref 0–0.04)
IMM GRANULOCYTES NFR BLD AUTO: 0.1 % (ref 0–0.5)
INR PPP: 0.9 (ref 0.8–1.2)
LDLC SERPL CALC-MCNC: 88.6 MG/DL (ref 63–159)
LYMPHOCYTES # BLD AUTO: 0.9 K/UL (ref 1–4.8)
LYMPHOCYTES NFR BLD: 13.6 % (ref 18–48)
MCH RBC QN AUTO: 29.8 PG (ref 27–31)
MCHC RBC AUTO-ENTMCNC: 33.8 G/DL (ref 32–36)
MCV RBC AUTO: 88 FL (ref 82–98)
MONOCYTES # BLD AUTO: 0.3 K/UL (ref 0.3–1)
MONOCYTES NFR BLD: 3.8 % (ref 4–15)
NEUTROPHILS # BLD AUTO: 5.6 K/UL (ref 1.8–7.7)
NEUTROPHILS NFR BLD: 80.5 % (ref 38–73)
NONHDLC SERPL-MCNC: 144 MG/DL
NRBC BLD-RTO: 0 /100 WBC
PLATELET # BLD AUTO: 204 K/UL (ref 150–450)
PMV BLD AUTO: 10.7 FL (ref 9.2–12.9)
POTASSIUM SERPL-SCNC: 4.1 MMOL/L (ref 3.5–5.1)
PROT SERPL-MCNC: 7.6 G/DL (ref 6–8.4)
PROTHROMBIN TIME: 10.1 SEC (ref 9–12.5)
RBC # BLD AUTO: 3.49 M/UL (ref 4–5.4)
SODIUM SERPL-SCNC: 136 MMOL/L (ref 136–145)
TRIGL SERPL-MCNC: 277 MG/DL (ref 30–150)
TSH SERPL DL<=0.005 MIU/L-ACNC: 5.13 UIU/ML (ref 0.34–5.6)
VIT B12 SERPL-MCNC: 323 PG/ML (ref 210–950)
WBC # BLD AUTO: 6.93 K/UL (ref 3.9–12.7)

## 2024-04-03 PROCEDURE — 80053 COMPREHEN METABOLIC PANEL: CPT | Performed by: NURSE PRACTITIONER

## 2024-04-03 PROCEDURE — G0378 HOSPITAL OBSERVATION PER HR: HCPCS

## 2024-04-03 PROCEDURE — 93010 ELECTROCARDIOGRAM REPORT: CPT | Mod: ,,, | Performed by: INTERNAL MEDICINE

## 2024-04-03 PROCEDURE — 85610 PROTHROMBIN TIME: CPT | Performed by: NURSE PRACTITIONER

## 2024-04-03 PROCEDURE — 85025 COMPLETE CBC W/AUTO DIFF WBC: CPT | Performed by: NURSE PRACTITIONER

## 2024-04-03 PROCEDURE — 83036 HEMOGLOBIN GLYCOSYLATED A1C: CPT | Performed by: STUDENT IN AN ORGANIZED HEALTH CARE EDUCATION/TRAINING PROGRAM

## 2024-04-03 PROCEDURE — 36415 COLL VENOUS BLD VENIPUNCTURE: CPT | Performed by: STUDENT IN AN ORGANIZED HEALTH CARE EDUCATION/TRAINING PROGRAM

## 2024-04-03 PROCEDURE — 93005 ELECTROCARDIOGRAM TRACING: CPT | Performed by: INTERNAL MEDICINE

## 2024-04-03 PROCEDURE — 99285 EMERGENCY DEPT VISIT HI MDM: CPT | Mod: 25

## 2024-04-03 PROCEDURE — 82607 VITAMIN B-12: CPT | Performed by: NURSE PRACTITIONER

## 2024-04-03 PROCEDURE — 82962 GLUCOSE BLOOD TEST: CPT

## 2024-04-03 PROCEDURE — 84443 ASSAY THYROID STIM HORMONE: CPT | Performed by: NURSE PRACTITIONER

## 2024-04-03 PROCEDURE — 82746 ASSAY OF FOLIC ACID SERUM: CPT | Performed by: NURSE PRACTITIONER

## 2024-04-03 PROCEDURE — 80061 LIPID PANEL: CPT | Performed by: NURSE PRACTITIONER

## 2024-04-03 RX ORDER — ASPIRIN 81 MG/1
81 TABLET ORAL DAILY
Status: DISCONTINUED | OUTPATIENT
Start: 2024-04-04 | End: 2024-04-04 | Stop reason: HOSPADM

## 2024-04-03 RX ORDER — SODIUM,POTASSIUM PHOSPHATES 280-250MG
2 POWDER IN PACKET (EA) ORAL
Status: DISCONTINUED | OUTPATIENT
Start: 2024-04-03 | End: 2024-04-04 | Stop reason: HOSPADM

## 2024-04-03 RX ORDER — BISACODYL 10 MG/1
10 SUPPOSITORY RECTAL DAILY PRN
Status: DISCONTINUED | OUTPATIENT
Start: 2024-04-03 | End: 2024-04-04 | Stop reason: HOSPADM

## 2024-04-03 RX ORDER — LANOLIN ALCOHOL/MO/W.PET/CERES
800 CREAM (GRAM) TOPICAL
Status: DISCONTINUED | OUTPATIENT
Start: 2024-04-03 | End: 2024-04-04 | Stop reason: HOSPADM

## 2024-04-03 RX ORDER — INSULIN ASPART 100 [IU]/ML
0-5 INJECTION, SOLUTION INTRAVENOUS; SUBCUTANEOUS
Status: DISCONTINUED | OUTPATIENT
Start: 2024-04-03 | End: 2024-04-04 | Stop reason: HOSPADM

## 2024-04-03 RX ORDER — GLUCAGON 1 MG
1 KIT INJECTION
Status: DISCONTINUED | OUTPATIENT
Start: 2024-04-03 | End: 2024-04-04 | Stop reason: HOSPADM

## 2024-04-03 RX ORDER — AMOXICILLIN 250 MG
1 CAPSULE ORAL 2 TIMES DAILY PRN
Status: DISCONTINUED | OUTPATIENT
Start: 2024-04-03 | End: 2024-04-04 | Stop reason: HOSPADM

## 2024-04-03 RX ORDER — ONDANSETRON HYDROCHLORIDE 2 MG/ML
4 INJECTION, SOLUTION INTRAVENOUS EVERY 12 HOURS PRN
Status: DISCONTINUED | OUTPATIENT
Start: 2024-04-03 | End: 2024-04-04 | Stop reason: HOSPADM

## 2024-04-03 RX ORDER — ACETAMINOPHEN 325 MG/1
650 TABLET ORAL EVERY 4 HOURS PRN
Status: DISCONTINUED | OUTPATIENT
Start: 2024-04-03 | End: 2024-04-04 | Stop reason: HOSPADM

## 2024-04-03 RX ORDER — NALOXONE HCL 0.4 MG/ML
0.02 VIAL (ML) INJECTION
Status: DISCONTINUED | OUTPATIENT
Start: 2024-04-03 | End: 2024-04-04 | Stop reason: HOSPADM

## 2024-04-03 RX ORDER — LORAZEPAM 2 MG/ML
1 INJECTION INTRAMUSCULAR
Status: DISCONTINUED | OUTPATIENT
Start: 2024-04-03 | End: 2024-04-04 | Stop reason: HOSPADM

## 2024-04-03 RX ORDER — ENOXAPARIN SODIUM 100 MG/ML
40 INJECTION SUBCUTANEOUS EVERY 24 HOURS
Status: DISCONTINUED | OUTPATIENT
Start: 2024-04-04 | End: 2024-04-04 | Stop reason: HOSPADM

## 2024-04-03 RX ORDER — TALC
9 POWDER (GRAM) TOPICAL NIGHTLY PRN
Status: DISCONTINUED | OUTPATIENT
Start: 2024-04-03 | End: 2024-04-04 | Stop reason: HOSPADM

## 2024-04-03 RX ORDER — IBUPROFEN 200 MG
24 TABLET ORAL
Status: DISCONTINUED | OUTPATIENT
Start: 2024-04-03 | End: 2024-04-04 | Stop reason: HOSPADM

## 2024-04-03 RX ORDER — CALCIUM CARBONATE 200(500)MG
500 TABLET,CHEWABLE ORAL 3 TIMES DAILY PRN
Status: DISCONTINUED | OUTPATIENT
Start: 2024-04-03 | End: 2024-04-04 | Stop reason: HOSPADM

## 2024-04-03 RX ORDER — SODIUM CHLORIDE 0.9 % (FLUSH) 0.9 %
10 SYRINGE (ML) INJECTION
Status: DISCONTINUED | OUTPATIENT
Start: 2024-04-03 | End: 2024-04-04 | Stop reason: HOSPADM

## 2024-04-03 RX ORDER — IBUPROFEN 200 MG
16 TABLET ORAL
Status: DISCONTINUED | OUTPATIENT
Start: 2024-04-03 | End: 2024-04-04 | Stop reason: HOSPADM

## 2024-04-03 RX ORDER — LABETALOL HYDROCHLORIDE 5 MG/ML
10 INJECTION, SOLUTION INTRAVENOUS
Status: DISCONTINUED | OUTPATIENT
Start: 2024-04-03 | End: 2024-04-04 | Stop reason: HOSPADM

## 2024-04-03 RX ORDER — LOPERAMIDE HYDROCHLORIDE 2 MG/1
2 CAPSULE ORAL
Status: DISCONTINUED | OUTPATIENT
Start: 2024-04-03 | End: 2024-04-04 | Stop reason: HOSPADM

## 2024-04-03 NOTE — Clinical Note
Diagnosis: Paresthesias [988597]   Future Attending Provider: SERGIO BLUE [[id number]]   Place in Observation: [facility] [[id number]]

## 2024-04-04 ENCOUNTER — NURSE TRIAGE (OUTPATIENT)
Dept: ADMINISTRATIVE | Facility: CLINIC | Age: 64
End: 2024-04-04
Payer: MEDICAID

## 2024-04-04 ENCOUNTER — PATIENT MESSAGE (OUTPATIENT)
Dept: FAMILY MEDICINE | Facility: CLINIC | Age: 64
End: 2024-04-04
Payer: MEDICAID

## 2024-04-04 VITALS
WEIGHT: 130.06 LBS | HEART RATE: 79 BPM | TEMPERATURE: 99 F | HEIGHT: 64 IN | SYSTOLIC BLOOD PRESSURE: 157 MMHG | DIASTOLIC BLOOD PRESSURE: 77 MMHG | BODY MASS INDEX: 22.2 KG/M2 | RESPIRATION RATE: 18 BRPM | OXYGEN SATURATION: 99 %

## 2024-04-04 LAB
ALBUMIN SERPL BCP-MCNC: 4.5 G/DL (ref 3.5–5.2)
ALP SERPL-CCNC: 56 U/L (ref 55–135)
ALT SERPL W/O P-5'-P-CCNC: 11 U/L (ref 10–44)
ANION GAP SERPL CALC-SCNC: 7 MMOL/L (ref 8–16)
APTT PPP: 29.1 SEC (ref 21–32)
AST SERPL-CCNC: 19 U/L (ref 10–40)
BASOPHILS # BLD AUTO: 0.02 K/UL (ref 0–0.2)
BASOPHILS NFR BLD: 0.3 % (ref 0–1.9)
BILIRUB SERPL-MCNC: 1 MG/DL (ref 0.1–1)
BUN SERPL-MCNC: 18 MG/DL (ref 8–23)
CALCIUM SERPL-MCNC: 9.5 MG/DL (ref 8.7–10.5)
CHLORIDE SERPL-SCNC: 108 MMOL/L (ref 95–110)
CO2 SERPL-SCNC: 23 MMOL/L (ref 23–29)
CREAT SERPL-MCNC: 0.7 MG/DL (ref 0.5–1.4)
DIFFERENTIAL METHOD BLD: ABNORMAL
EOSINOPHIL # BLD AUTO: 0.1 K/UL (ref 0–0.5)
EOSINOPHIL NFR BLD: 1 % (ref 0–8)
ERYTHROCYTE [DISTWIDTH] IN BLOOD BY AUTOMATED COUNT: 18.5 % (ref 11.5–14.5)
EST. GFR  (NO RACE VARIABLE): >60 ML/MIN/1.73 M^2
ESTIMATED AVG GLUCOSE: 71 MG/DL (ref 68–131)
GLUCOSE SERPL-MCNC: 125 MG/DL (ref 70–110)
HBA1C MFR BLD: 4.1 % (ref 4.5–6.2)
HCT VFR BLD AUTO: 30.1 % (ref 37–48.5)
HGB BLD-MCNC: 10.1 G/DL (ref 12–16)
IMM GRANULOCYTES # BLD AUTO: 0.03 K/UL (ref 0–0.04)
IMM GRANULOCYTES NFR BLD AUTO: 0.4 % (ref 0–0.5)
LYMPHOCYTES # BLD AUTO: 1.1 K/UL (ref 1–4.8)
LYMPHOCYTES NFR BLD: 15.5 % (ref 18–48)
MAGNESIUM SERPL-MCNC: 2.1 MG/DL (ref 1.6–2.6)
MCH RBC QN AUTO: 29.6 PG (ref 27–31)
MCHC RBC AUTO-ENTMCNC: 33.6 G/DL (ref 32–36)
MCV RBC AUTO: 88 FL (ref 82–98)
MONOCYTES # BLD AUTO: 0.4 K/UL (ref 0.3–1)
MONOCYTES NFR BLD: 5.1 % (ref 4–15)
NEUTROPHILS # BLD AUTO: 5.4 K/UL (ref 1.8–7.7)
NEUTROPHILS NFR BLD: 77.7 % (ref 38–73)
NRBC BLD-RTO: 0 /100 WBC
PHOSPHATE SERPL-MCNC: 3.9 MG/DL (ref 2.7–4.5)
PLATELET # BLD AUTO: 222 K/UL (ref 150–450)
PMV BLD AUTO: 11.7 FL (ref 9.2–12.9)
POTASSIUM SERPL-SCNC: 3.9 MMOL/L (ref 3.5–5.1)
PROT SERPL-MCNC: 6.8 G/DL (ref 6–8.4)
RBC # BLD AUTO: 3.41 M/UL (ref 4–5.4)
SODIUM SERPL-SCNC: 138 MMOL/L (ref 136–145)
WBC # BLD AUTO: 6.9 K/UL (ref 3.9–12.7)

## 2024-04-04 PROCEDURE — 85025 COMPLETE CBC W/AUTO DIFF WBC: CPT | Performed by: PHYSICAL THERAPY ASSISTANT

## 2024-04-04 PROCEDURE — 25000003 PHARM REV CODE 250

## 2024-04-04 PROCEDURE — 85730 THROMBOPLASTIN TIME PARTIAL: CPT | Performed by: PHYSICAL THERAPY ASSISTANT

## 2024-04-04 PROCEDURE — G0378 HOSPITAL OBSERVATION PER HR: HCPCS

## 2024-04-04 PROCEDURE — 92610 EVALUATE SWALLOWING FUNCTION: CPT

## 2024-04-04 PROCEDURE — 92523 SPEECH SOUND LANG COMPREHEN: CPT

## 2024-04-04 PROCEDURE — 97165 OT EVAL LOW COMPLEX 30 MIN: CPT

## 2024-04-04 PROCEDURE — 80053 COMPREHEN METABOLIC PANEL: CPT | Performed by: PHYSICAL THERAPY ASSISTANT

## 2024-04-04 PROCEDURE — 97161 PT EVAL LOW COMPLEX 20 MIN: CPT

## 2024-04-04 PROCEDURE — 84100 ASSAY OF PHOSPHORUS: CPT | Performed by: PHYSICAL THERAPY ASSISTANT

## 2024-04-04 PROCEDURE — 83735 ASSAY OF MAGNESIUM: CPT | Performed by: PHYSICAL THERAPY ASSISTANT

## 2024-04-04 RX ORDER — ATORVASTATIN CALCIUM 40 MG/1
80 TABLET, FILM COATED ORAL DAILY
Qty: 60 TABLET | Refills: 0 | Status: SHIPPED | OUTPATIENT
Start: 2024-04-04 | End: 2024-05-04

## 2024-04-04 RX ORDER — ATORVASTATIN CALCIUM 40 MG/1
40 TABLET, FILM COATED ORAL DAILY
Qty: 30 TABLET | Refills: 0 | Status: SHIPPED | OUTPATIENT
Start: 2024-04-04 | End: 2024-04-04

## 2024-04-04 RX ORDER — AMLODIPINE BESYLATE 5 MG/1
5 TABLET ORAL DAILY
Status: DISCONTINUED | OUTPATIENT
Start: 2024-04-04 | End: 2024-04-04 | Stop reason: HOSPADM

## 2024-04-04 RX ORDER — CLOPIDOGREL BISULFATE 75 MG/1
75 TABLET ORAL DAILY
Qty: 21 TABLET | Refills: 0 | Status: SHIPPED | OUTPATIENT
Start: 2024-04-04 | End: 2024-04-25

## 2024-04-04 RX ORDER — AMLODIPINE BESYLATE 5 MG/1
5 TABLET ORAL DAILY
Qty: 30 TABLET | Refills: 0 | Status: ON HOLD | OUTPATIENT
Start: 2024-04-04 | End: 2024-04-10 | Stop reason: HOSPADM

## 2024-04-04 RX ORDER — ASPIRIN 81 MG/1
81 TABLET ORAL DAILY
Refills: 0
Start: 2024-04-05 | End: 2025-04-05

## 2024-04-04 RX ADMIN — AMLODIPINE BESYLATE 5 MG: 5 TABLET ORAL at 12:04

## 2024-04-04 NOTE — H&P
Pending sale to [facility] - Emergency Dept  Hospital Medicine  History & Physical    Patient Name: Nell Smith  MRN: [id number]  Patient Class: OP- Observation  Admission Date: 4/3/2024  Attending Physician: Marguerite Terrell MD   Primary Care Provider: Deepti Kennedy MD         Patient information was obtained from patient and ER records.     Subjective:     Principal Problem:Acute focal neurological deficit    Chief Complaint:   Chief Complaint   Patient presents with    Numbness     1830 had numbness in right hand and had it yesterday afternoon, only lasted a few min. Over last 2 days has been mixing up words and just feels off. Recently weaned self off zoloft        HPI: Patient is a 63-year-old female with a past medical history of anxiety and diabetes.  Patient presents to the ED today with complaints of right hand paresthesias and speech difficulty intermittently over the past 2 days.  Patient's family at bedside and report she did not have slurred speech but did have difficulty finding words on 2 separate occasions, once yesterday and once today.  On arrival to the ED, patient's blood pressure 212/104.  Patient denies taking chronic blood pressure medications regularly.  Patient reports she is recently weaned off of Zoloft, has been off completely for approximate 3 weeks.  Patient reports only taking OTC supplements, metformin, and insulin regularly.  Patient reports she does not like taking medications and would rather control with diet.  Patient does report feeling she has had increased anxiety since weaning from medication.  Patient does have history of right shoulder pain, frozen shoulder diagnosis in the past.  Patient denies cervical pain or recent injury.  Stat CT of the head was completed in the ED which shows no acute abnormalities.  No tPA given due to being out of window.  Patient denies chest pain, nausea, vomiting, abdominal pain, shortness of breath, lower extremity edema, headache or  other neurological symptoms.  EKG shows sinus rhythm with marked sinus arrhythmia.     In ED:  TSH 5.132, triglycerides 277, LDL 88.6, potassium 4.1, glucose 130, hemoglobin 10.4, INR 0.9.    No past medical history on file.    Past Surgical History:   Procedure Laterality Date     SECTION       SECTION       SECTION       SECTION      WISDOM TOOTH EXTRACTION      at age 18; x 4       Review of patient's allergies indicates:   Allergen Reactions    Codeine        No current facility-administered medications on file prior to encounter.     Current Outpatient Medications on File Prior to Encounter   Medication Sig    buPROPion (WELLBUTRIN) 75 MG tablet Take 1 tablet (75 mg total) by mouth once daily for 7 days, THEN 1 tablet (75 mg total) 2 (two) times daily.    ergocalciferol, vitamin D2, (VITAMIN D ORAL) Take by mouth.    FOLIC ACID ORAL Take by mouth.    insulin aspart U-100 (NOVOLOG) 100 unit/mL (3 mL) InPn pen Inject 2 Units into the skin 2 (two) times daily with meals.    metFORMIN (FORTAMET) 500 mg 24hr tablet Take 1 tablet (500 mg total) by mouth 2 (two) times a day. Take 1 tablet orally twice daily    RED YEAST RICE ORAL Take 1 capsule by mouth once daily.    sertraline (ZOLOFT) 100 MG tablet Take 1 tablet (100 mg total) by mouth once daily.    TRESIBA FLEXTOUCH U-100 100 unit/mL (3 mL) insulin pen Inject 14 Units into the skin every evening.    vit B complex no.12/niacin,B3, (VITAMIN B COMPLEX NO.12-NIACIN ORAL) Take by mouth.     Family History       Problem Relation (Age of Onset)    Hypertension Mother          Tobacco Use    Smoking status: Never    Smokeless tobacco: Never   Substance and Sexual Activity    Alcohol use: Not Currently    Drug use: Yes     Comment: CBD oil     Sexual activity: Not on file     Review of Systems   Constitutional: Negative.  Negative for appetite change, chills, fatigue, fever and unexpected weight change.   HENT: Negative.   Negative for congestion, ear pain, hearing loss, rhinorrhea, sore throat and tinnitus.    Eyes: Negative.  Negative for pain, discharge and redness.   Respiratory: Negative.  Negative for cough, shortness of breath, wheezing and stridor.    Cardiovascular: Negative.  Negative for chest pain, palpitations and leg swelling.   Gastrointestinal: Negative.  Negative for abdominal distention, abdominal pain, constipation, diarrhea, nausea and vomiting.   Endocrine: Negative.    Genitourinary: Negative.  Negative for decreased urine volume, difficulty urinating, flank pain, hematuria and urgency.   Musculoskeletal:  Positive for arthralgias (right shoulder). Negative for gait problem and myalgias.   Skin: Negative.  Negative for pallor and rash.   Allergic/Immunologic: Negative.  Negative for environmental allergies, food allergies and immunocompromised state.   Neurological:  Positive for speech difficulty and numbness (paresthesias through right hand). Negative for dizziness, syncope, facial asymmetry, weakness and headaches.   Hematological: Negative.    Psychiatric/Behavioral:  Negative for agitation, confusion, self-injury and suicidal ideas. The patient is nervous/anxious.      Objective:     Vital Signs (Most Recent):  Temp: 98.7 °F (37.1 °C) (04/03/24 1928)  Pulse: 92 (04/03/24 2028)  Resp: 20 (04/03/24 2028)  BP: (!) 192/95 (04/03/24 2028)  SpO2: 98 % (04/03/24 1928) Vital Signs (24h Range):  Temp:  [98.7 °F (37.1 °C)] 98.7 °F (37.1 °C)  Pulse:  [] 92  Resp:  [20] 20  SpO2:  [98 %] 98 %  BP: (192-212)/() 192/95     Weight: 59 kg (130 lb)  Body mass index is 22.67 kg/m².     Physical Exam  Vitals reviewed.   Constitutional:       General: She is not in acute distress.     Appearance: Normal appearance. She is normal weight. She is not toxic-appearing.   HENT:      Head: Normocephalic and atraumatic.      Nose: Nose normal. No congestion or rhinorrhea.      Mouth/Throat:      Mouth: Mucous membranes are  moist.      Pharynx: Oropharynx is clear.   Eyes:      General:         Right eye: No discharge.         Left eye: No discharge.      Extraocular Movements: Extraocular movements intact.      Conjunctiva/sclera: Conjunctivae normal.      Pupils: Pupils are equal, round, and reactive to light.   Cardiovascular:      Rate and Rhythm: Normal rate and regular rhythm.      Pulses: Normal pulses.      Comments: Occasional missed beats, EKG with sinus arrhythmia noted, patient states this is chronic  Pulmonary:      Effort: Pulmonary effort is normal. No respiratory distress.      Breath sounds: Normal breath sounds.   Chest:      Chest wall: No tenderness.   Abdominal:      General: Abdomen is flat. Bowel sounds are normal. There is no distension.      Palpations: Abdomen is soft.      Tenderness: There is no abdominal tenderness. There is no right CVA tenderness, left CVA tenderness or guarding.   Musculoskeletal:         General: Normal range of motion.      Cervical back: Normal range of motion and neck supple.      Comments: No tenderness noted throughout cervical spine   Skin:     General: Skin is warm and dry.      Findings: No rash.   Neurological:      General: No focal deficit present.      Mental Status: She is alert and oriented to person, place, and time. Mental status is at baseline.      Cranial Nerves: No cranial nerve deficit.      Sensory: No sensory deficit.      Motor: No weakness.      Gait: Gait normal.      Comments: Answering all questions appropriately, no speech difficulty on my exam, alert and oriented x3, cranial nerve 7 intact with facial symmetry noted, equal strength bilaterally in upper and lower extremities   Psychiatric:         Mood and Affect: Mood normal.              CRANIAL NERVES     CN III, IV, VI   Pupils are equal, round, and reactive to light.       Significant Labs: All pertinent labs within the past 24 hours have been reviewed.  CBC:   Recent Labs   Lab 04/03/24  2030   WBC  6.93   HGB 10.4*   HCT 30.8*        CMP:   Recent Labs   Lab 04/03/24 2030      K 4.1      CO2 20*   *   BUN 22   CREATININE 0.8   CALCIUM 9.8   PROT 7.6   ALBUMIN 4.9   BILITOT 1.0   ALKPHOS 70   AST 17   ALT 9*   ANIONGAP 10     Lipid Panel:   Recent Labs   Lab 04/03/24 2030   CHOL 186   HDL 42   LDLCALC 88.6   TRIG 277*   CHOLHDL 22.6     TSH:   Recent Labs   Lab 04/03/24 2030   TSH 5.132       Significant Imaging: I have reviewed all pertinent imaging results/findings within the past 24 hours.    Assessment/Plan:     * Acute focal neurological deficit  as evidenced by physical exam and history  Stroke risks include diabetes  Symptoms of speech difficulty and right upper extremity paresthesias  Differential diagnosis includes cervical radiculopathy, anxiety, CVA, TIA, subarachnoid hemorrhage, complex migraine, seizure, encephalopathy, or tumor  Initiate stroke protocol, Trend NIH stroke scale  STAT CT of head performed with no acute findings  Obtain MRI brain   Bilateral carotid ultrasound  2-D echo with bubble study   Obtain lipids- LDL 88.6 nonfasting, triglycerides 277  TSH, HgA1c, B12, Folate  Give Lovenox, ASA  Seizures precautions  Ativan 1 mg PRN Seizures / call neurologist after first dose  Monitor vitals, allow for permissive hypertension per stroke protocol  Stroke education given and patient educated about both stroke prevention and symptoms to be mindful of. .  Consult neurology for further evaluation and recommendations      Type 2 diabetes mellitus with diabetic neuropathy, with long-term current use of insulin  POCT glucose, insulin sliding scale, diabetic diet      Generalized anxiety disorder  Patient states she recently weaned off Zoloft.  Patient also has Wellbutrin on chart but states she has not able to take this due to increased blood sugars.  We will continue to monitor for now.        VTE Risk Mitigation (From admission, onward)           Ordered     enoxaparin  injection 40 mg  Every 24 hours         04/03/24 2234     IP VTE LOW RISK PATIENT  Once         04/03/24 2205     Place sequential compression device  Until discontinued         04/03/24 2205                    On 04/03/2024, patient should be placed in hospital observation services under my care in collaboration with Dr Terrell.       DAYO Kirby  Department of Hospital Medicine  [facility] - Emergency Dept

## 2024-04-04 NOTE — SUBJECTIVE & OBJECTIVE
No past medical history on file.    Past Surgical History:   Procedure Laterality Date     SECTION       SECTION       SECTION       SECTION      WISDOM TOOTH EXTRACTION      at age 18; x 4       Review of patient's allergies indicates:   Allergen Reactions    Codeine        No current facility-administered medications on file prior to encounter.     Current Outpatient Medications on File Prior to Encounter   Medication Sig    buPROPion (WELLBUTRIN) 75 MG tablet Take 1 tablet (75 mg total) by mouth once daily for 7 days, THEN 1 tablet (75 mg total) 2 (two) times daily.    ergocalciferol, vitamin D2, (VITAMIN D ORAL) Take by mouth.    FOLIC ACID ORAL Take by mouth.    insulin aspart U-100 (NOVOLOG) 100 unit/mL (3 mL) InPn pen Inject 2 Units into the skin 2 (two) times daily with meals.    metFORMIN (FORTAMET) 500 mg 24hr tablet Take 1 tablet (500 mg total) by mouth 2 (two) times a day. Take 1 tablet orally twice daily    RED YEAST RICE ORAL Take 1 capsule by mouth once daily.    sertraline (ZOLOFT) 100 MG tablet Take 1 tablet (100 mg total) by mouth once daily.    TRESIBA FLEXTOUCH U-100 100 unit/mL (3 mL) insulin pen Inject 14 Units into the skin every evening.    vit B complex no.12/niacin,B3, (VITAMIN B COMPLEX NO.12-NIACIN ORAL) Take by mouth.     Family History       Problem Relation (Age of Onset)    Hypertension Mother          Tobacco Use    Smoking status: Never    Smokeless tobacco: Never   Substance and Sexual Activity    Alcohol use: Not Currently    Drug use: Yes     Comment: CBD oil     Sexual activity: Not on file     Review of Systems   Constitutional: Negative.  Negative for appetite change, chills, fatigue, fever and unexpected weight change.   HENT: Negative.  Negative for congestion, ear pain, hearing loss, rhinorrhea, sore throat and tinnitus.    Eyes: Negative.  Negative for pain, discharge and redness.   Respiratory: Negative.  Negative for cough,  shortness of breath, wheezing and stridor.    Cardiovascular: Negative.  Negative for chest pain, palpitations and leg swelling.   Gastrointestinal: Negative.  Negative for abdominal distention, abdominal pain, constipation, diarrhea, nausea and vomiting.   Endocrine: Negative.    Genitourinary: Negative.  Negative for decreased urine volume, difficulty urinating, flank pain, hematuria and urgency.   Musculoskeletal:  Positive for arthralgias (right shoulder). Negative for gait problem and myalgias.   Skin: Negative.  Negative for pallor and rash.   Allergic/Immunologic: Negative.  Negative for environmental allergies, food allergies and immunocompromised state.   Neurological:  Positive for speech difficulty and numbness (paresthesias through right hand). Negative for dizziness, syncope, facial asymmetry, weakness and headaches.   Hematological: Negative.    Psychiatric/Behavioral:  Negative for agitation, confusion, self-injury and suicidal ideas. The patient is nervous/anxious.      Objective:     Vital Signs (Most Recent):  Temp: 98.7 °F (37.1 °C) (04/03/24 1928)  Pulse: 92 (04/03/24 2028)  Resp: 20 (04/03/24 2028)  BP: (!) 192/95 (04/03/24 2028)  SpO2: 98 % (04/03/24 1928) Vital Signs (24h Range):  Temp:  [98.7 °F (37.1 °C)] 98.7 °F (37.1 °C)  Pulse:  [] 92  Resp:  [20] 20  SpO2:  [98 %] 98 %  BP: (192-212)/() 192/95     Weight: 59 kg (130 lb)  Body mass index is 22.67 kg/m².     Physical Exam  Vitals reviewed.   Constitutional:       General: She is not in acute distress.     Appearance: Normal appearance. She is normal weight. She is not toxic-appearing.   HENT:      Head: Normocephalic and atraumatic.      Nose: Nose normal. No congestion or rhinorrhea.      Mouth/Throat:      Mouth: Mucous membranes are moist.      Pharynx: Oropharynx is clear.   Eyes:      General:         Right eye: No discharge.         Left eye: No discharge.      Extraocular Movements: Extraocular movements intact.       Conjunctiva/sclera: Conjunctivae normal.      Pupils: Pupils are equal, round, and reactive to light.   Cardiovascular:      Rate and Rhythm: Normal rate and regular rhythm.      Pulses: Normal pulses.      Comments: Occasional missed beats, EKG with sinus arrhythmia noted, patient states this is chronic  Pulmonary:      Effort: Pulmonary effort is normal. No respiratory distress.      Breath sounds: Normal breath sounds.   Chest:      Chest wall: No tenderness.   Abdominal:      General: Abdomen is flat. Bowel sounds are normal. There is no distension.      Palpations: Abdomen is soft.      Tenderness: There is no abdominal tenderness. There is no right CVA tenderness, left CVA tenderness or guarding.   Musculoskeletal:         General: Normal range of motion.      Cervical back: Normal range of motion and neck supple.      Comments: No tenderness noted throughout cervical spine   Skin:     General: Skin is warm and dry.      Findings: No rash.   Neurological:      General: No focal deficit present.      Mental Status: She is alert and oriented to person, place, and time. Mental status is at baseline.      Cranial Nerves: No cranial nerve deficit.      Sensory: No sensory deficit.      Motor: No weakness.      Gait: Gait normal.      Comments: Answering all questions appropriately, no speech difficulty on my exam, alert and oriented x3, cranial nerve 7 intact with facial symmetry noted, equal strength bilaterally in upper and lower extremities   Psychiatric:         Mood and Affect: Mood normal.              CRANIAL NERVES     CN III, IV, VI   Pupils are equal, round, and reactive to light.       Significant Labs: All pertinent labs within the past 24 hours have been reviewed.  CBC:   Recent Labs   Lab 04/03/24 2030   WBC 6.93   HGB 10.4*   HCT 30.8*        CMP:   Recent Labs   Lab 04/03/24 2030      K 4.1      CO2 20*   *   BUN 22   CREATININE 0.8   CALCIUM 9.8   PROT 7.6   ALBUMIN 4.9    BILITOT 1.0   ALKPHOS 70   AST 17   ALT 9*   ANIONGAP 10     Lipid Panel:   Recent Labs   Lab 04/03/24 2030   CHOL 186   HDL 42   LDLCALC 88.6   TRIG 277*   CHOLHDL 22.6     TSH:   Recent Labs   Lab 04/03/24 2030   TSH 5.132       Significant Imaging: I have reviewed all pertinent imaging results/findings within the past 24 hours.

## 2024-04-04 NOTE — ED NOTES
ADVISED PT THAT WE WILL PROVIDE HOME MEDS.  STATES WILL TAKE HER OWN DESPITE PROTOCOL. MD ADVISED AT DISCLOSURES.

## 2024-04-04 NOTE — PT/OT/SLP EVAL
Occupational Therapy   Evaluation and Discharge Note    Name: Nell Smith  MRN: [id number]  Admitting Diagnosis: Acute focal neurological deficit  Recent Surgery: * No surgery found *      Recommendations:     Discharge Recommendations: No Therapy Indicated  Discharge Equipment Recommendations: none  Barriers to discharge:  None    Assessment:     Nell Smith is a 63 y.o. female with a medical diagnosis of Acute focal neurological deficit. At this time, patient is functioning at their prior level of function and does not require further acute OT services.     Plan:     During this hospitalization, patient does not require further acute OT services.  Please re-consult if situation changes.    Plan of Care Reviewed with: patient, spouse    Subjective     Chief Complaint: none stated  Patient/Family Comments/goals: ready to return home    Occupational Profile:  Living Environment: Pt lives with spouse and multiple family members in a 2 story home with bedroom/bathroom on 1st floor. Pt has a WIS and a garden tub  Previous level of function: Independent with ADLs, IADLs, and mobility  Roles and Routines: primary homemaker; wife; mother; grandmother  Equipment Used at home: none  Assistance upon Discharge: yes, from family    Pain/Comfort:  Pain Rating 1: 0/10    Patients cultural, spiritual, Zoroastrianism conflicts given the current situation:      Objective:     Communicated with: nursing prior to session.  Patient found sitting edge of bed with pulse ox (continuous), telemetry upon OT entry to room.    General Precautions: Standard, fall  Orthopedic Precautions: N/A  Braces: N/A  Respiratory Status: Room air     Occupational Performance:    Functional Mobility/Transfers:  Patient completed Sit <> Stand Transfer with independence  with  no assistive device   Functional Mobility: pt amb in room with independence with no AD, no LOB, no SOB    Activities of Daily Living:  Feeding:  independence    Grooming: independence     Lower Body Dressing: independence EOB sock  Toileting: independence per pt, she has been going to the bathroom independently with no difficulty     Cognitive/Visual Perceptual:  Cognitive/Psychosocial Skills:     -       Oriented to: Person, Place, Time, and Situation   -       Follows Commands/attention:Follows two-step commands  -       Communication: clear/fluent  -       Memory: No Deficits noted  -       Safety awareness/insight to disability: intact   -       Mood/Affect/Coping skills/emotional control: Appropriate to situation, Cooperative, and Pleasant    Physical Exam:  Balance:    -       Good seated/standing balance  Upper Extremity Range of Motion:     -       Right Upper Extremity: WFL  -       Left Upper Extremity: WFL  Upper Extremity Strength:    -       Right Upper Extremity: WFL  -       Left Upper Extremity: WFL   Strength:    -       Right Upper Extremity: WFL  -       Left Upper Extremity: WFL  Fine Motor Coordination:    -       Intact  Gross motor coordination:   WFL    AMPAC 6 Click ADL:  AMPAC Total Score: 24    Treatment & Education:  Pt educated on role of OT/POC, importance of OOB/EOB activity, use of call bell, and safety during ADLs, transfers, and functional mobility.    Patient left sitting edge of bed with all lines intact, call button in reach, and spouse present    GOALS:   Multidisciplinary Problems       Occupational Therapy Goals       Not on file                    History:     No past medical history on file.      Past Surgical History:   Procedure Laterality Date     SECTION       SECTION       SECTION       SECTION      WISDOM TOOTH EXTRACTION      at age 18; x 4       Time Tracking:     OT Date of Treatment: 24  OT Start Time: 935  OT Stop Time: 943  OT Total Time (min): 8 min    Billable Minutes:Evaluation 8    2024

## 2024-04-04 NOTE — PLAN OF CARE
Notified by DAYO Shah that patient leaving AMA   04/04/24 1203   Final Note   Assessment Type Final Discharge Note   Anticipated Discharge Disposition Left Against

## 2024-04-04 NOTE — PHARMACY MED REC
"              .        Admission Medication History     The home medication history was taken by Farzana Glaser.    You may go to "Admission" then "Reconcile Home Medications" tabs to review and/or act upon these items.     The home medication list has been updated by the Pharmacy department.   Please read ALL comments highlighted in yellow.   Please address this information as you see fit.    Feel free to contact us if you have any questions or require assistance.        Medications listed below were obtained from: Patient/family and Analytic software- [employer]  No current facility-administered medications on file prior to encounter.     Current Outpatient Medications on File Prior to Encounter   Medication Sig Dispense Refill    conjugated estrogens (PREMARIN) vaginal cream Place 1 g vaginally daily as needed.      metFORMIN (FORTAMET) 500 mg 24hr tablet Take 1 tablet (500 mg total) by mouth 2 (two) times a day. Take 1 tablet orally twice daily 60 tablet 5    progesterone micronized (CRINONE) 4 % Gel Place 1 application  vaginally daily as needed.      RED YEAST RICE ORAL Take 1 capsule by mouth 2 (two) times a day.      TRESIBA FLEXTOUCH U-100 100 unit/mL (3 mL) insulin pen Inject 14 Units into the skin once daily.      insulin aspart U-100 (NOVOLOG) 100 unit/mL (3 mL) InPn pen Inject 2 Units into the skin 2 (two) times daily with meals.      sertraline (ZOLOFT) 100 MG tablet Take 1 tablet (100 mg total) by mouth once daily. (Patient not taking: Reported on 4/3/2024) 90 tablet 3    [DISCONTINUED] buPROPion (WELLBUTRIN) 75 MG tablet Take 1 tablet (75 mg total) by mouth once daily for 7 days, THEN 1 tablet (75 mg total) 2 (two) times daily. 67 tablet 0    [DISCONTINUED] ergocalciferol, vitamin D2, (VITAMIN D ORAL) Take by mouth.      [DISCONTINUED] FOLIC ACID ORAL Take by mouth.      [DISCONTINUED] vit B complex no.12/niacin,B3, (VITAMIN B COMPLEX NO.12-NIACIN ORAL) Take by mouth.         Potential " issues to be addressed PRIOR TO DISCHARGE  Patient reported not taking the following medications: (Novolog &Zoloft). These medications remain on the home medication list. Please address accordingly.     Farzana Glaser  EXT 1929

## 2024-04-04 NOTE — PROGRESS NOTES
Went to do echo at 1300 4/4/24; patient refused.  Patient stated she was leaving the hospital and did not want the echo/bubble study done. Will cancel echo due to patient refusal.

## 2024-04-04 NOTE — HPI
Patient is a 63-year-old female with a past medical history of anxiety and diabetes.  Patient presents to the ED today with complaints of right hand paresthesias and speech difficulty intermittently over the past 2 days.  Patient's family at bedside and report she did not have slurred speech but did have difficulty finding words on 2 separate occasions, once yesterday and once today.  On arrival to the ED, patient's blood pressure 212/104.  Patient denies taking chronic blood pressure medications regularly.  Patient reports she is recently weaned off of Zoloft, has been off completely for approximate 3 weeks.  Patient reports only taking OTC supplements, metformin, and insulin regularly.  Patient reports she does not like taking medications and would rather control with diet.  Patient does report feeling she has had increased anxiety since weaning from medication.  Patient does have history of right shoulder pain, frozen shoulder diagnosis in the past.  Patient denies cervical pain or recent injury.  Stat CT of the head was completed in the ED which shows no acute abnormalities.  No tPA given due to being out of window.  Patient denies chest pain, nausea, vomiting, abdominal pain, shortness of breath, lower extremity edema, headache or other neurological symptoms.  EKG shows sinus rhythm with marked sinus arrhythmia.     In ED:  TSH 5.132, triglycerides 277, LDL 88.6, potassium 4.1, glucose 130, hemoglobin 10.4, INR 0.9.

## 2024-04-04 NOTE — ASSESSMENT & PLAN NOTE
Patient states she recently weaned off Zoloft.  Patient also has Wellbutrin on chart but states she has not able to take this due to increased blood sugars.  We will continue to monitor for now.

## 2024-04-04 NOTE — HOSPITAL COURSE
Patient was admitted to hospital with acute neurological deficit.  The patient was monitored closely throughout her hospital stay.  CT head was obtained.  Neurology was consulted admission.  Patient declined MRI brain and further imaging secondary to claustrophobia.  Patient refused care and wanted to leave AMA.  Discussed at length with the patient and patient's  dangers of leaving against medical advice.  They voiced understanding and patient stated she wanted to leave AMA.  Patient was signed out AMA.  Patient's blood pressure was elevated and Norvasc was ordered.  Patient instructed importance of monitoring blood pressure.  Patient was prescribed Plavix, aspirin and Lipitor per Neurology recommendations.  Prescription for Norvasc was sent on discharge.  Patient to follow up with PCP regarding outpatient MRI and MRA brain.  Patient was signed out against medical advice.

## 2024-04-04 NOTE — PLAN OF CARE
04/04/24 1526   Final Note   Assessment Type Final Discharge Note   Anticipated Discharge Disposition Left Against

## 2024-04-04 NOTE — FIRST PROVIDER EVALUATION
Emergency Department TeleTriage Encounter Note      CHIEF COMPLAINT    Chief Complaint   Patient presents with    Numbness     1830 had numbness in right hand and had it yesterday afternoon, only lasted a few min. Over last 2 days has been mixing up words and just feels off. Recently weaned self off zoloft       VITAL SIGNS   Initial Vitals   BP Pulse Resp Temp SpO2   04/03/24 1932 04/03/24 1928 04/03/24 1928 04/03/24 1928 04/03/24 1928   (!) 212/104 100 20 98.7 °F (37.1 °C) 98 %      MAP       --                   ALLERGIES    Review of patient's allergies indicates:   Allergen Reactions    Codeine        PROVIDER TRIAGE NOTE  This is a teletriage evaluation of a 63 y.o. female presenting to the ED with c/o numbness 5-7 mins yesterday and again today at 5p. Yesterday after lunch - difficulty with words/mixing up words per patient/family. Previously on zoloft, weaning off. Reports no motor weakness. No obvious slurred speech or facila droop during teletriage eval. Spoke with Dr. Rico regarding my concern, will proceed with stroke code, triage notified and replied. Limited physical exam via telehealth: The patient is awake, alert, answering questions appropriately and is not in respiratory distress. Initial orders will be placed and care will be transferred to an alternate provider when patient is roomed for a full evaluation. Any additional orders and the final disposition will be determined by that provider.         ORDERS  Labs Reviewed   CBC W/ AUTO DIFFERENTIAL   COMPREHENSIVE METABOLIC PANEL   PROTIME-INR   TSH   LIPID PANEL   POCT GLUCOSE MONITORING CONTINUOUS       ED Orders (720h ago, onward)      Start Ordered     Status Ordering Provider    04/03/24 1947 04/03/24 1946  Consult to Telemedicine - Acute Stroke  Once        Provider:  (Not yet assigned)    Ordered MICHELLE SHERIDAN    04/03/24 1947 04/03/24 1946  Cardiac Monitoring - Adult  Continuous        Comments: Notify Physician If:    Ordered  MICHELLE SHERIDAN P.    04/03/24 1947 04/03/24 1946  Pulse Oximetry Continuous  Continuous         Ordered MICHELLE SHERIDAN P.    04/03/24 1947 04/03/24 1946  Vital signs  Until discontinued        Comments: Perform every 30 minutes x 1.5 hours, then every 1 hour.    Ordered MICHELLE SHERIDAN P.    04/03/24 1947 04/03/24 1946  Neuro checks:  LOC/AVPU, Orientation, GCS if LOC altered, Pupils if LOC altered or GCS <10, Speech/Language, Facial Symmetry, Motor  Until discontinued        Comments: Perform every 30 minutes x 1.5 hours, then every 1 hour. Complete NIH Stroke Scale, GCS & Pupil assessment with any change in LOC or neuro deterioration including acute onset or worsening headache. Notify Attending with acute change in LOC, neurological assessment deterioration, pupillary changes, or acute/worsening headache.    Ordered MICHELLE SHERIDAN P.    04/03/24 1947 04/03/24 1946  Complete Manuel Screening Assessment No eating, drinking, or oral medications until patient passes the screen. Notify MD of results.  Once        Comments: No eating, drinking, or oral medications until patient passes the screen. Notify MD of results.    Ordered MICHELLE SHERIDAN P.    04/03/24 1947 04/03/24 1946  Diet NPO  Diet effective now        Comments: No eating, drinking, or oral medications until patient passes the Manuel or evaluated by Speech.    Ordered MICHELLE SHERIDAN P.    04/03/24 1947 04/03/24 1946  Insert peripheral IV  Once         Ordered MICHELLE SHERIDAN P.    04/03/24 1947 04/03/24 1946  Complete NIH Stroke Scale PRN with any deterioration or worsening of neurologic condition.  Once        Comments: PRN with any deterioration or worsening of neurologic condition.    Ordered MICHELLE SHERIDAN P.    04/03/24 1947 04/03/24 1946  CBC W/ AUTO DIFFERENTIAL  STAT         Ordered MICHELLE SHERIDAN P.    04/03/24 1947 04/03/24 1946  Comprehensive metabolic panel  STAT         Ordered MICHELLE SHERIDAN P.    04/03/24 1947 04/03/24 1946  Protime-INR  STAT          Ordered MICHELLE SHERIDAN    04/03/24 1947 04/03/24 1946  TSH  STAT         Ordered MICHELLE SHERIDAN    04/03/24 1947 04/03/24 1946  CT HEAD FOR STROKE  1 time imaging         Ordered MICHLELE SHERIDAN.    04/03/24 1947 04/03/24 1946  Oxygen Continuous  Continuous        Comments: FiO2 30%, Stroke    Ordered MICHELLE SHERIDAN.    04/03/24 1947 04/03/24 1946  ECG 12 lead  Once         Ordered MICHELLE SHERIDAN    04/03/24 1947 04/03/24 1946  POCT glucose  Once         Ordered MICHELLE SHERIDAN    04/03/24 1947 04/03/24 1946  LDL - Lipid Panel  STAT         Ordered MICHELLE SHERIDAN              Virtual Visit Note: The provider triage portion of this emergency department evaluation and documentation was performed via [employer]ome, a HIPAA-compliant telemedicine application, in concert with a tele-presenter in the room. A face to face patient evaluation with one of my colleagues will occur once the patient is placed in an emergency department room.      DISCLAIMER: This note was prepared with Ram[employer]ia voice recognition transcription software. Garbled syntax, mangled pronouns, and other bizarre constructions may be attributed to that software system.

## 2024-04-04 NOTE — PT/OT/SLP EVAL
Physical Therapy Evaluation and Discharge Note    Patient Name:  Nell Smith   MRN:  [id number]    Recommendations:     Discharge Recommendations: No Therapy Indicated  Discharge Equipment Recommendations: none   Barriers to discharge: None    Assessment:     Nell Smith is a 63 y.o. female admitted with a medical diagnosis of Acute focal neurological deficit. Patient is agreeable to participation with PT evaluation in ED. She reports resolution of L hand numbness and speech difficulty she experienced prior to admission. Strength and coordination testing WNL. She lives with spouse and  is independent at baseline. She transfers sit to stand independently. She ambulated 300' independently with no dizziness or loss of balance. She returned to sitting EOB with SLP present. At this time, patient is functioning at their prior level of function and does not require further acute PT services.     Recent Surgery: * No surgery found *      Plan:     During this hospitalization, patient does not require further acute PT services.  Please re-consult if situation changes.      Subjective   States she has a lot of stress from constantly having grandkids in her house and is going to try to work on establishing boundaries to decrease stress   Chief Complaint: did not sleep well due to noise outside of her room   Patient/Family Comments/goals: home soon   Pain/Comfort:  Pain Rating 1: 0/10    Patients cultural, spiritual, Amish conflicts given the current situation:      Living Environment:  Patient lives with spouse in a 2SH with bedroom on first floor and 2 EDUARD[address].   Prior to admission, patients level of function was independent. She endorses previous falls attributed to trips over clutter. She denies any recent PT. She is retired.  Equipment used at home: none.  DME owned (not currently used): none.  Upon discharge, patient will have assistance from family.    Objective:     Communicated with ELLIE Cook prior to session.   Patient found sitting edge of bed with pulse ox (continuous), Tracheostomy upon PT entry to room.    General Precautions: Standard, fall    Orthopedic Precautions:N/A   Braces: N/A  Respiratory Status: Room air    Exams:  Fine Motor Coordination:    -       Intact  Left hand, finger to nose, Right hand, finger to nose, Left hand thumb/finger opposition skills, Right hand thumb/finger opposition skills, Left hand, diadochokinesis skill , and Right hand, diadochokinesis skill   RLE Strength: WNL  LLE Strength: WNL    Functional Mobility:  Transfers:     Sit to Stand:  independence with no AD  Gait: 300' independently with no dizziness or loss of balance    AM-PAC 6 CLICK MOBILITY  Total Score:24       Treatment and Education:  Patient was educated on the importance of OOB activity and functional mobility to negate negative effects of prolonged bed rest during hospitalization, safe transfers and ambulation, and D/C planning     AM-PAC 6 CLICK MOBILITY  Total Score:24     Patient left sitting edge of bed with all lines intact, call button in reach, and SLP and spouse present.    GOALS:   Multidisciplinary Problems       Physical Therapy Goals       Not on file                    History:     No past medical history on file.    Past Surgical History:   Procedure Laterality Date     SECTION       SECTION       SECTION       SECTION      WISDOM TOOTH EXTRACTION      at age 18; x 4       Time Tracking:     PT Received On: 24  PT Start Time: 55     PT Stop Time: 1006  PT Total Time (min): 11 min     Billable Minutes: Evaluation 11      2024

## 2024-04-04 NOTE — ASSESSMENT & PLAN NOTE
as evidenced by physical exam and history  Stroke risks include diabetes  Symptoms of speech difficulty and right upper extremity paresthesias  Differential diagnosis includes cervical radiculopathy, anxiety, CVA, TIA, subarachnoid hemorrhage, complex migraine, seizure, encephalopathy, or tumor  Initiate stroke protocol, Trend NIH stroke scale  STAT CT of head performed with no acute findings  Obtain MRI brain   Bilateral carotid ultrasound  2-D echo with bubble study   Obtain lipids- LDL 88.6 nonfasting, triglycerides 277  TSH, HgA1c, B12, Folate  Give Lovenox, ASA  Seizures precautions  Ativan 1 mg PRN Seizures / call neurologist after first dose  Monitor vitals, allow for permissive hypertension per stroke protocol  Stroke education given and patient educated about both stroke prevention and symptoms to be mindful of. .  Consult neurology for further evaluation and recommendations

## 2024-04-04 NOTE — PLAN OF CARE
[facility] - Emergency Dept  Initial Discharge Assessment       Primary Care Provider: Deepti Kennedy MD    Admission Diagnosis: Paresthesias [R20.2]    Admission Date: 4/3/2024  Expected Discharge Date: 4/4/2024  Social work intern met with Pt at bedside to complete discharge assessment. Pt AAOx4s. Demographics, PCP, and insurance verified. No home health. No dialysis. Pt reports ability to complete ADLs without assistance. Pt verbalized plan to discharge home via family transport. Pt has no other needs to be addressed at this time.    Transition of Care Barriers: Unisured    Payor: /     Extended Emergency Contact Information  Primary Emergency Contact: Ingrid Neville  Mobile Phone: [phone]  Relation: Daughter  Preferred language: English   needed? No    Discharge Plan A: Home with family  Discharge Plan B: Home with family      [id number] - [place], [place] - [address] & [facility]  [address]  [facility] [address]  Phone: [phone] Fax: [phone]      Initial Assessment (most recent)       Adult Discharge Assessment - 04/04/24 1039          Discharge Assessment    Assessment Type Discharge Planning Assessment     Confirmed/corrected address, phone number and insurance Yes     Confirmed Demographics Correct on Facesheet     Source of Information patient     Reason For Admission Acute focal neurological deficit     People in Home spouse     Do you expect to return to your current living situation? Yes     Do you have help at home or someone to help you manage your care at home? Yes     Who are your caregiver(s) and their phone number(s)? Ingrid Neville (Daughter) [phone]     Prior to hospitilization cognitive status: Alert/Oriented     Current cognitive status: Alert/Oriented     Walking or Climbing Stairs Difficulty no     Dressing/Bathing Difficulty no     Home Accessibility wheelchair accessible     Home Layout  Able to live on 1st floor     Equipment Currently Used at Home none     Readmission within 30 days? No     Patient currently being followed by outpatient case management? No     Do you currently have service(s) that help you manage your care at home? No     Do you take prescription medications? Yes     Do you have prescription coverage? No     Do you have any problems affording any of your prescribed medications? TBD     Is the patient taking medications as prescribed? yes     Who is going to help you get home at discharge? KelinIngrid (Daughter) [phone]     How do you get to doctors appointments? family or friend will provide     Are you on dialysis? No     Do you take coumadin? No     Discharge Plan A Home with family     Discharge Plan B Home with family     DME Needed Upon Discharge  none     Discharge Plan discussed with: Patient     Transition of Care Barriers Unisured

## 2024-04-04 NOTE — DISCHARGE SUMMARY
[facility] - Emergency Dept  Hospital Medicine  Discharge Summary      Patient Name: Nell Smith  MRN: [id number]  EUSEBIO: [id number]  Patient Class: OP- Observation  Admission Date: 4/3/2024  Hospital Length of Stay: 0 days  Discharge Date and Time:  04/04/2024 2:29 PM  Attending Physician: Julius Toussaint MD   Discharging Provider: Alyssa Kay PA-C  Primary Care Provider: Deepti Kennedy MD    Primary Care Team: Networked reference to record PCT     HPI:   Patient is a 63-year-old female with a past medical history of anxiety and diabetes.  Patient presents to the ED today with complaints of right hand paresthesias and speech difficulty intermittently over the past 2 days.  Patient's family at bedside and report she did not have slurred speech but did have difficulty finding words on 2 separate occasions, once yesterday and once today.  On arrival to the ED, patient's blood pressure 212/104.  Patient denies taking chronic blood pressure medications regularly.  Patient reports she is recently weaned off of Zoloft, has been off completely for approximate 3 weeks.  Patient reports only taking OTC supplements, metformin, and insulin regularly.  Patient reports she does not like taking medications and would rather control with diet.  Patient does report feeling she has had increased anxiety since weaning from medication.  Patient does have history of right shoulder pain, frozen shoulder diagnosis in the past.  Patient denies cervical pain or recent injury.  Stat CT of the head was completed in the ED which shows no acute abnormalities.  No tPA given due to being out of window.  Patient denies chest pain, nausea, vomiting, abdominal pain, shortness of breath, lower extremity edema, headache or other neurological symptoms.  EKG shows sinus rhythm with marked sinus arrhythmia.     In ED:  TSH 5.132, triglycerides 277, LDL 88.6, potassium 4.1, glucose 130, hemoglobin 10.4, INR 0.9.    * No surgery  found *      Hospital Course:   Patient was admitted to hospital with acute neurological deficit.  The patient was monitored closely throughout her hospital stay.  CT head was obtained.  Neurology was consulted admission.  Patient declined MRI brain and further imaging secondary to claustrophobia.  Patient refused care and wanted to leave AMA.  Discussed at length with the patient and patient's  dangers of leaving against medical advice.  They voiced understanding and patient stated she wanted to leave AMA.  Patient was signed out AMA.  Patient's blood pressure was elevated and Norvasc was ordered.  Patient instructed importance of monitoring blood pressure.  Patient was prescribed Plavix, aspirin and Lipitor per Neurology recommendations.  Prescription for Norvasc was sent on discharge.  Patient to follow up with PCP regarding outpatient MRI and MRA brain.  Patient was signed out against medical advice.     Goals of Care Treatment Preferences:  Code Status: Full Code      Consults:   Consults (From admission, onward)          Status Ordering Provider     Inpatient consult to Neurology  Once        Provider:  Modesto Olivera MD    Completed ELIAS QUIÑONEZ     Inpatient consult to Registered Dietitian/Nutritionist  Once        Provider:  (Not yet assigned)    Acknowledged ELIAS QUIÑONEZ     IP consult to case management/social work  Once        Provider:  (Not yet assigned)    Completed ELIAS QUIÑONEZ     Consult to Telemedicine - Acute Stroke  Once        Provider:  (Not yet assigned)    Acknowledged MICHELLE SHERIDAN            No new Assessment & Plan notes have been filed under this hospital service since the last note was generated.  Service: Hospital Medicine    Final Active Diagnoses:    Diagnosis Date Noted POA    PRINCIPAL PROBLEM:  Acute focal neurological deficit [R29.818] 04/03/2024 Yes    Generalized anxiety disorder [F41.1] 09/29/2021 Yes    Type 2 diabetes mellitus with diabetic  neuropathy, with long-term current use of insulin [E11.40, Z79.4] 09/29/2021 Not Applicable      Problems Resolved During this Admission:       Discharged Condition: against medical advice    Disposition:     Follow Up:   Follow-up Information       Deepti Kennedy MD Follow up.    Specialty: Family Medicine  Contact information:  [address] [address] [place] [address]  [phone]               Modesto Olivera MD Follow up.    Specialty: Neurology  Contact information:  [address]   [place] [address]  [phone]                           Patient Instructions:   No discharge procedures on file.    Significant Diagnostic Studies: Labs: CMP   Recent Labs   Lab 04/03/24  2030 04/04/24  0444    138   K 4.1 3.9    108   CO2 20* 23   * 125*   BUN 22 18   CREATININE 0.8 0.7   CALCIUM 9.8 9.5   PROT 7.6 6.8   ALBUMIN 4.9 4.5   BILITOT 1.0 1.0   ALKPHOS 70 56   AST 17 19   ALT 9* 11   ANIONGAP 10 7*    and CBC   Recent Labs   Lab 04/03/24  2030 04/04/24  0444   WBC 6.93 6.90   HGB 10.4* 10.1*   HCT 30.8* 30.1*    222       Pending Diagnostic Studies:       Procedure Component Value Units Date/Time    Echo Saline Bubble? Yes [[id number]]     Order Status: Sent Lab Status: No result            Medications:  Reconciled Home Medications:      Medication List        START taking these medications      amLODIPine 5 MG tablet  Commonly known as: NORVASC  Take 1 tablet (5 mg total) by mouth once daily.     aspirin 81 MG EC tablet  Commonly known as: ECOTRIN  Take 1 tablet (81 mg total) by mouth once daily.  Start taking on: April 5, 2024     atorvastatin 40 MG tablet  Commonly known as: LIPITOR  Take 2 tablets (80 mg total) by mouth once daily.     clopidogreL 75 mg tablet  Commonly known as: PLAVIX  Take 1 tablet (75 mg total) by mouth once daily. for 21 days            CONTINUE taking these medications      conjugated estrogens vaginal cream  Commonly known as: PREMARIN  Place 1 g vaginally  daily as needed.     insulin aspart U-100 100 unit/mL (3 mL) Inpn pen  Commonly known as: NovoLOG  Inject 2 Units into the skin 2 (two) times daily with meals.     metFORMIN 500 mg 24hr tablet  Commonly known as: FORTAMET  Take 1 tablet (500 mg total) by mouth 2 (two) times a day. Take 1 tablet orally twice daily     progesterone micronized 4 % Gel  Commonly known as: CRINONE  Place 1 application  vaginally daily as needed.     RED YEAST RICE ORAL  Take 1 capsule by mouth 2 (two) times a day.     sertraline 100 MG tablet  Commonly known as: ZOLOFT  Take 1 tablet (100 mg total) by mouth once daily.     TRESIBA FLEXTOUCH U-100 100 unit/mL (3 mL) insulin pen  Generic drug: insulin degludec  Inject 14 Units into the skin once daily.              Indwelling Lines/Drains at time of discharge:   Lines/Drains/Airways       None                   Time spent on the discharge of patient: 35 minutes         Alyssa Kay PA-C  Department of Hospital Medicine  [facility] - Emergency Dept

## 2024-04-04 NOTE — CONSULTS
[facility]  Department of Neurology  Neurology Consultation Note        PATIENT NAME: Nell Smith  MRN: [id number]  CSN: [id number]      TODAY'S DATE: 04/04/2024  ADMIT DATE: 4/3/2024                            CONSULTING PROVIDER: Modesto Olivera MD  CONSULT REQUESTED BY: Julius Toussaint MD      Reason for consult: TIA       History obtained from chart review and the patient.    HPI per EMR: Nell Smith is a 63 y.o. female with a history of anxiety and diabetes. Patient presents to the ED today with complaints of right hand paresthesias and speech difficulty intermittently over the past 2 days. Patient's family at bedside and report she did not have slurred speech but did have difficulty finding words on 2 separate occasions, once yesterday and once today. On arrival to the ED, patient's blood pressure 212/104. Patient denies taking chronic blood pressure medications regularly. Patient reports she is recently weaned off of Zoloft, has been off completely for approximate 3 weeks. Patient reports only taking OTC supplements, metformin, and insulin regularly. Patient reports she does not like taking medications and would rather control with diet. Patient does report feeling she has had increased anxiety since weaning from medication. Patient does have history of right shoulder pain, frozen shoulder diagnosis in the past. Patient denies cervical pain or recent injury. Stat CT of the head was completed in the ED which shows no acute abnormalities. No tPA given due to being out of window. Patient denies chest pain, nausea, vomiting, abdominal pain, shortness of breath, lower extremity edema, headache or other neurological symptoms. EKG shows sinus rhythm with marked sinus arrhythmia.     Neurology consult:  Patient was seen examined by me.  She is currently alert and oriented x3 and back to her baseline.  She states that for the past 2 days, she has been having episodes of intermittent word-finding  difficulty and yesterday she had numbness on her right upper extremity for which he presented to the hospital.  Her symptoms have completely improved now and she feels back to baseline.  CT head was done which was negative for acute pathology.  She refused getting CT angiogram or MRI brain.  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  She has never had intracranial ischemic insult.    PREVIOUS MEDICAL HISTORY:  No past medical history on file.  PREVIOUS SURGICAL HISTORY:  Past Surgical History:   Procedure Laterality Date     SECTION       SECTION       SECTION       SECTION      WISDOM TOOTH EXTRACTION      at age 18; x 4     FAMILY MEDICAL HISTORY:  Family History   Problem Relation Age of Onset    Hypertension Mother      SOCIAL HISTORY:  Social History     Tobacco Use    Smoking status: Never    Smokeless tobacco: Never   Substance Use Topics    Alcohol use: Not Currently    Drug use: Yes     Comment: CBD oil      ALLERGIES:  Review of patient's allergies indicates:   Allergen Reactions    Codeine     Penicillins      HOME MEDICATIONS:  Prior to Admission medications    Medication Sig Start Date End Date Taking? Authorizing Provider   conjugated estrogens (PREMARIN) vaginal cream Place 1 g vaginally daily as needed.   Yes Provider, Historical   metFORMIN (FORTAMET) 500 mg 24hr tablet Take 1 tablet (500 mg total) by mouth 2 (two) times a day. Take 1 tablet orally twice daily 21  Yes Ivy Rainey MD   progesterone micronized (CRINONE) 4 % Gel Place 1 application  vaginally daily as needed.   Yes Provider, Historical   RED YEAST RICE ORAL Take 1 capsule by mouth 2 (two) times a day.   Yes Provider, Historical   TRESIBA FLEXTOUCH U-100 100 unit/mL (3 mL) insulin pen Inject 14 Units into the skin once daily. 21  Yes Provider, Historical   insulin aspart U-100 (NOVOLOG) 100 unit/mL (3 mL) InPn pen  "Inject 2 Units into the skin 2 (two) times daily with meals. 4/23/21   Provider, Historical   sertraline (ZOLOFT) 100 MG tablet Take 1 tablet (100 mg total) by mouth once daily.  Patient not taking: Reported on 4/3/2024 3/6/23 3/5/24  Deepti Kennedy MD     CURRENT SCHEDULED MEDICATIONS:   aspirin  81 mg Oral Daily    enoxparin  40 mg Subcutaneous Q24H (prophylaxis, 1700)     CURRENT INFUSIONS:   sodium chloride 0.9%       CURRENT PRN MEDICATIONS:  acetaminophen, bisacodyL, calcium carbonate, dextrose 50%, dextrose 50%, glucagon (human recombinant), glucose, glucose, insulin aspart U-100, labetalol, loperamide, lorazepam, magnesium oxide, magnesium oxide, melatonin, naloxone, ondansetron, potassium bicarbonate, potassium bicarbonate, potassium bicarbonate, potassium, sodium phosphates, potassium, sodium phosphates, potassium, sodium phosphates, senna-docusate 8.6-50 mg, sodium chloride 0.9%, sodium chloride 0.9%    REVIEW OF SYSTEMS:  Please refer to the HPI for all pertinent positive and negative findings. A comprehensive review of all other systems was negative.       PHYSICAL EXAM:  Patient Vitals for the past 24 hrs:   BP Temp Temp src Pulse Resp SpO2 Height Weight   04/04/24 0742 -- -- -- 84 -- 100 % -- --   04/04/24 0710 (!) 173/83 -- -- 86 20 99 % -- --   04/04/24 0400 (!) 167/77 -- -- 87 19 100 % -- --   04/04/24 0333 -- -- -- 77 -- 99 % -- --   04/04/24 0116 (!) 184/81 98.8 °F (37.1 °C) Oral 77 (!) 21 98 % 5' 3.5" (1.613 m) 59 kg (130 lb)   04/04/24 0113 -- -- -- 77 -- 98 % -- --   04/04/24 0017 -- -- -- 77 -- 98 % -- --   04/03/24 2332 (!) 184/81 -- -- 87 -- 100 % -- --   04/03/24 2317 -- -- -- 91 (!) 21 100 % -- --   04/03/24 2217 -- -- -- 88 -- 100 % -- --   04/03/24 2208 (!) 199/98 -- -- 84 -- 100 % -- --   04/03/24 2047 -- -- -- 81 (!) 34 -- -- --   04/03/24 2028 (!) 192/95 -- -- 92 20 -- -- --   04/03/24 2017 (!) 194/106 -- -- 97 (!) 31 (!) 89 % -- --   04/03/24 1933 -- -- -- -- -- -- 5' 3.5" " (1.613 m) --   04/03/24 1932 (!) 212/104 -- -- -- -- -- -- --   04/03/24 1928 -- 98.7 °F (37.1 °C) Oral 100 20 98 % -- 59 kg (130 lb)       GENERAL APPEARANCE: Alert, well-developed, well-nourished female in no acute distress.  HEENT: Normocephalic and atraumatic. PERRL. Oropharynx unremarkable.  PULM: Normal respiratory effort. No accessory muscle use.  CV: RRR.  ABDOMEN: Soft, nontender.  EXTREMITIES: No obvious signs of vascular compromise. Pulses present. No cyanosis, clubbing or edema.  SKIN: Clear; no rashes, lesions or skin breaks in exposed areas.    NEURO:  MENTAL STATUS: Patient awake and oriented to time, place, and person, recent/remote memory normal, attention span/concentration normal, and speech fluent without paraphasic errors.  Affect euthymic.    CRANIAL NERVES:  CN I: Not tested.  CN II: Fundoscopic exam deferred.  CN III, IV, VI: Pupils equal, round and reactive to light.  Extraocular movements full and intact.  CN V: Facial sensation normal.  CN VII: Facial asymmetry absent.  CN VIII: Hearing grossly normal and equal bilaterally.  No skew deviation or pathologic nystagmus.  CN IX, X: Palate elevates symmetrically. Speech/articulation is clear without dysarthria.  CN XI: Shoulder shrug and chin rotation equal with good strength.  CN XII: Tongue protrusion midline.    MOTOR:  Bulk normal. Tone normal and symmetric throughout.  Abnormal movements absent.  Tremor: none present.  Strength 5/5 throughout.    REFLEXES:  DTRs 2+ throughout.  Plantar response equivocal bilaterally.  SENSATION: grossly intact throughout.  COORDINATION: normal finger-to-nose.  STATION: not tested.  GAIT: not tested.      NIHSS:  1a      Level of Consciousness (alert, drowsy, etc.):   0=alert; keenly responsive  1b.     Level of Consciousness Questions (month, age): 0= able to answer both questions  1c.     Level of Consciousness Commands (open, close eyes, make fist, let go):  0=Answers both tasks correctly  2.      Best  Gaze (eyes open - patient follows examiner's finger or face):      0=normal  3.      Visual (introduce visual stimulus/threat to patient's visual field quadrants):  0=No visual loss  4.      Facial Palsy (show teeth, raise eyebrows and squeeze eyes shut):        0=Normal symmetric movement  5a.     Motor Arm - Left (elevate extremity 90 degrees and score drift/movement):       0=No drift, limb holds 90 (or 45) degrees for full 10 seconds  5b.     Motor Arm - Right (elevate extremity 90 degrees and score drift/movement):      0=No drift, limb holds 90 (or 45) degrees for full 10 seconds  6a.     Motor Leg - Left (elevate extremity 30 degrees and score drift/movement):       0=No drift, limb holds 90 (or 45) degrees for full 10 seconds  6b      Motor Leg - Right (elevate extremity 30 degrees and score drift/movement):      0=No drift, limb holds 90 (or 45) degrees for full 10 seconds  7.      Limb Ataxia (finger-nose, heel down shin):      0=Absent  8.      Sensory (pin prick to face, arm, trunk, and leg - compare side to side):        0=Normal; no sensory loss  9.      Best Language (name items, describe a picture and read sentences):      0=No aphasia, normal  10.     Dysarthria (evaluate speech clarity by patient repeating listed words): 0=Normal  11.     Extinction and Inattention (use prior testing to identify neglect or double simultaneous stimuli testing):      0=No abnormality          NIH Stroke Scale Total:         0      Labs:  Recent Labs   Lab 04/03/24  2030 04/04/24  0444    138   K 4.1 3.9    108   CO2 20* 23   BUN 22 18   CREATININE 0.8 0.7   * 125*   CALCIUM 9.8 9.5   PHOS  --  3.9   MG  --  2.1     Recent Labs   Lab 04/03/24  2030 04/04/24  0444   WBC 6.93 6.90   HGB 10.4* 10.1*   HCT 30.8* 30.1*    222     Recent Labs   Lab 04/03/24  2030 04/04/24  0444   ALBUMIN 4.9 4.5   PROT 7.6 6.8   BILITOT 1.0 1.0   ALKPHOS 70 56   ALT 9* 11   AST 17 19     Lab Results   Component  "Value Date    INR 0.9 04/03/2024     Lab Results   Component Value Date    TRIG 277 (H) 04/03/2024    HDL 42 04/03/2024    CHOLHDL 22.6 04/03/2024     Lab Results   Component Value Date    HGBA1C 4.1 (L) 04/03/2024     No results found for: "PROTEINCSF", "GLUCCSF", "WBCCSF"    Imaging:  I have reviewed and interpreted the pertinent imaging and lab results.      US Carotid Bilateral  EXAM DESCRIPTION:  US CAROTID DOPPLER BILATERAL    CLINICAL HISTORY:  Velocities evaluation.    TECHNIQUE:  Real time grey scale and Doppler ultrasound were utilized to evaluate the bilateral carotid arteries.    COMPARISON: None.    FINDINGS:    All velocities in cm/sec.    RIGHT CAROTID:  Morphology: No significant plaque.  Peak Systolic Velocities (PSV) (Normal <125):  Proximal CCA: 57  Proximal ICA: 30  Mid ICA: 41  Distal ICA: 42  ECA: 65  Right vertebral: Antegrade flow, 41  ICA/CCA PSV ratio: 0.7 (Normal <2.0)  End Diastolic Velocities (EDV) (Normal <40)  Proximal ICA: 9  Mid ICA: 15  Distal ICA: 22    LEFT CAROTID:  Morphology: No significant plaque.  Peak Systolic Velocities (PSV) (Normal <125):  Proximal CCA: 69  Proximal ICA: 20  Mid ICA: 30  Distal ICA: 25  ECA: 66  Left vertebral: Antegrade flow, 82  ICA/CCA PSV ratio: 0.4 (Normal <2.0)  End Diastolic Velocities (EDV) (Normal <40)  Proximal ICA: 4  Mid ICA: 5  Distal ICA: 8    Some of the tracings demonstrate a somewhat irregular heart rhythm.    IMPRESSION:    1.  No stenosis of the bilateral carotid arteries.  2.  Irregular heart rhythm.    Electronically signed by:  Rani Kenny MD  04/03/2024 10:54 PM CDT Workstation: [id number]  CT Head Without Contrast   ADDENDUM #1     STROKE CODE RESULT: Pertinent findings were communicated by phone to Dr. Vignesh Jarquin on 4/3/2024 at 8:27 PM CDT.    Electronically signed by:  Rani Kenny MD  04/03/2024 08:30 PM CDT Workstation: [id number]     ORIGINAL REPORT     EXAM DESCRIPTION:  CT HEAD WITHOUT IV " CONTRAST    CLINICAL HISTORY:  Neuro deficit, acute, stroke suspected. Numbness in right hand. Also history yesterday afternoon, only lasted a few minutes. Over 2 days has been mixing upwards and just feels off.    TECHNIQUE:  Multiple axial images were obtained through the brain without intravenous contrast. Coronal and sagittal images were reconstructed.  All CT scans at this facility use dose modulation, iterative reconstruction, and/or weight based dosing when appropriate to reduce radiation dose to as low as reasonably achievable.    COMPARISON: None.    FINDINGS:    Of note, the most inferior portion of the posterior fossa is not included on the scan, with the inferior margin of the cerebellum and brain stem not included.    There is no evidence of acute intracranial hemorrhage or acute major territorial infarction.  The ventricles, sulci and cisterns are normal in size. There is no midline shift. No abnormal extra-axial fluid collections are seen.  The brain parenchyma is grossly unremarkable. Vascular calcifications.    The visualized portions of the paranasal sinuses are clear bilaterally.  The mastoid air cells are clear bilaterally.  The calvarium appears grossly unremarkable.    IMPRESSION:  1.  No evidence of acute intracranial abnormality.  2.  Of note, the most inferior portion of the posterior fossa is not included on the scan, with the inferior margin of the cerebellum and brain stem not included. If there is any clinical concern for pathology within the floor of the posterior fossa, repeat CT could be considered.    Electronically signed by:  Rani Kenny MD  04/03/2024 08:17 PM CDT Workstation: [id number]         ASSESSMENT & PLAN:    TIA  Uncontrolled hypertension      Plan  Admitted for further stroke workup.  Symptoms Could likely be secondary to TIA.  Start with Aspirin 81 mg, Plavix 75 mg and Lipitor 80mg.  Dual antiplatelet therapy for 3 weeks followed by monotherapy with aspirin  alone.  Symptoms started greater than 24 hours ago.  Slowly  normalize BP  Recommended CT angiogram head and neck and MRI brain however patient refused to get any further imaging and states that she would get imaging done outpatient.  PT OT and Speech therapy evaluate and treat  DVT prophylaxis with chemo/SCD prophylaxis           Patient to follow up with [facility] at [phone] within 7 days from discharge.     Stroke education was provided including stroke risk factors modification and any acute neurological changes including weakness, confusion, visual changes to come straight to the ER.     All questions were answered.                                     Thank you kindly for including us in the care of this patient. Please do not hesitate to contact us with any questions.             Modesto Olivera MD  Neurology/vascular Neurology  Date of Service: 04/04/2024  10:28 AM    --------------------------------------------------------------------------------------------------------------------------------------------------------------------------------------------------------------------------------------------------------------  Please note: This note was transcribed using voice recognition software. Because of this technology there are often uinintended grammatical, spelling, and other transcription errors. Please disregard these errors.

## 2024-04-04 NOTE — ED PROVIDER NOTES
Chief complaint:  Numbness (1830 had numbness in right hand and had it yesterday afternoon, only lasted a few min. Over last 2 days has been mixing up words and just feels off. Recently weaned self off zoloft)      HPI:  Nell Smith is a 63 y.o. female with hx PTSD, anxiety, IDDM presenting with intermittent paresthesias and numbness to the right hand as well as difficulty speaking marked by slurred words lasting approximately 5-10 minutes at a time and not necessarily at the same time since yesterday afternoon.  Last episode was earlier this evening and once again resolved.  She denies any complaints at this time.  She endorses history of anxiety but states no similar symptoms with anxiety in the past.  She denies visual change, dizziness, difficulty walking, weakness, difficulty swallowing.  No associated headache.  To clarify triage note she did stop taking sertraline 2-3 weeks ago without ill effect.    ROS: As per HPI and below:  No headache, vomiting, confusion, chest pain, syncope, dyspnea, abdominal pain.    Review of patient's allergies indicates:   Allergen Reactions    Codeine        Patient's Medications   New Prescriptions    No medications on file   Previous Medications    BUPROPION (WELLBUTRIN) 75 MG TABLET    Take 1 tablet (75 mg total) by mouth once daily for 7 days, THEN 1 tablet (75 mg total) 2 (two) times daily.    ERGOCALCIFEROL, VITAMIN D2, (VITAMIN D ORAL)    Take by mouth.    FOLIC ACID ORAL    Take by mouth.    INSULIN ASPART U-100 (NOVOLOG) 100 UNIT/ML (3 ML) INPN PEN    Inject 2 Units into the skin 2 (two) times daily with meals.    METFORMIN (FORTAMET) 500 MG 24HR TABLET    Take 1 tablet (500 mg total) by mouth 2 (two) times a day. Take 1 tablet orally twice daily    RED YEAST RICE ORAL    Take 1 capsule by mouth once daily.    SERTRALINE (ZOLOFT) 100 MG TABLET    Take 1 tablet (100 mg total) by mouth once daily.    TRESIBA FLEXTOUCH U-100 100 UNIT/ML (3 ML) INSULIN PEN    Inject 14  Units into the skin every evening.    VIT B COMPLEX NO.12/NIACIN,B3, (VITAMIN B COMPLEX NO.12-NIACIN ORAL)    Take by mouth.   Modified Medications    No medications on file   Discontinued Medications    No medications on file       PMH:  As per HPI and below:  No past medical history on file.  Past Surgical History:   Procedure Laterality Date     SECTION       SECTION       SECTION       SECTION      WISDOM TOOTH EXTRACTION      at age 18; x 4       Social History     Socioeconomic History    Marital status:    Tobacco Use    Smoking status: Never    Smokeless tobacco: Never   Substance and Sexual Activity    Alcohol use: Not Currently    Drug use: Yes     Comment: CBD oil        Family History   Problem Relation Age of Onset    Hypertension Mother        Physical Exam:    Vitals:    24   BP: (!) 192/95   Pulse: 92   Resp: 20   Temp:      GENERAL:  No apparent distress.  Alert.    HEENT:  Moist mucous membranes.  Normocephalic and atraumatic.    NECK:  No swelling.  Midline trachea.   CARDIOVASCULAR:  Regular rate and irregular rhythm.  2+ radial pulses.  No murmur.  PULMONARY:  Lungs clear to auscultation bilaterally.  No wheezes, rales, or rhonci.    ABDOMEN:  Non-tender and non-distended.    EXTREMITIES:  Warm and well perfused.  Brisk capillary refill.    NEUROLOGICAL:  Normal mental status.  Appropriate and conversant.  5/5 strength and sensation.  CN III through XII intact.  No pronator drift.    SKIN:  No rashes or ecchymoses.    BACK:  Atraumatic.  No CVA tenderness to palpation.      Labs Reviewed   CBC W/ AUTO DIFFERENTIAL - Abnormal; Notable for the following components:       Result Value    RBC 3.49 (*)     Hemoglobin 10.4 (*)     Hematocrit 30.8 (*)     RDW 18.6 (*)     Lymph # 0.9 (*)     Gran % 80.5 (*)     Lymph % 13.6 (*)     Mono % 3.8 (*)     All other components within normal limits   COMPREHENSIVE METABOLIC PANEL - Abnormal;  Notable for the following components:    CO2 20 (*)     Glucose 130 (*)     ALT 9 (*)     All other components within normal limits   LIPID PANEL - Abnormal; Notable for the following components:    Triglycerides 277 (*)     All other components within normal limits   POCT GLUCOSE - Abnormal; Notable for the following components:    POC Glucose 141 (*)     All other components within normal limits   PROTIME-INR   TSH   POCT GLUCOSE MONITORING CONTINUOUS       Current Discharge Medication List        CONTINUE these medications which have NOT CHANGED    Details   buPROPion (WELLBUTRIN) 75 MG tablet Take 1 tablet (75 mg total) by mouth once daily for 7 days, THEN 1 tablet (75 mg total) 2 (two) times daily.  Qty: 67 tablet, Refills: 0    Associated Diagnoses: Generalized anxiety disorder; Moderate episode of recurrent major depressive disorder; PTSD (post-traumatic stress disorder)      ergocalciferol, vitamin D2, (VITAMIN D ORAL) Take by mouth.      FOLIC ACID ORAL Take by mouth.      insulin aspart U-100 (NOVOLOG) 100 unit/mL (3 mL) InPn pen Inject 2 Units into the skin 2 (two) times daily with meals.      metFORMIN (FORTAMET) 500 mg 24hr tablet Take 1 tablet (500 mg total) by mouth 2 (two) times a day. Take 1 tablet orally twice daily  Qty: 60 tablet, Refills: 5    Associated Diagnoses: Type 2 diabetes mellitus with diabetic neuropathy, with long-term current use of insulin      RED YEAST RICE ORAL Take 1 capsule by mouth once daily.      sertraline (ZOLOFT) 100 MG tablet Take 1 tablet (100 mg total) by mouth once daily.  Qty: 90 tablet, Refills: 3    Associated Diagnoses: Generalized anxiety disorder      TRESIBA FLEXTOUCH U-100 100 unit/mL (3 mL) insulin pen Inject 14 Units into the skin every evening.      vit B complex no.12/niacin,B3, (VITAMIN B COMPLEX NO.12-NIACIN ORAL) Take by mouth.             Orders Placed This Encounter   Procedures    CT Head Without Contrast    CBC W/ AUTO DIFFERENTIAL    Comprehensive  metabolic panel    Protime-INR    TSH    LDL - Lipid Panel    Diet diabetic 2000 Calorie; Standard Tray    Cardiac Monitoring - Adult    Vital signs    Neuro checks:  LOC/AVPU, Orientation, GCS if LOC altered, Pupils if LOC altered or GCS <10, Speech/Language, Facial Symmetry, Motor    Complete Manuel Screening Assessment No eating, drinking, or oral medications until patient passes the screen. Notify MD of results.    Complete NIH Stroke Scale PRN with any deterioration or worsening of neurologic condition.    Consult to Telemedicine - Acute Stroke    Pulse Oximetry Continuous    Oxygen Continuous    ECG 12 lead    Insert peripheral IV       Imaging Results              CT Head Without Contrast (Edited Result - FINAL)  Result time 04/03/24 20:30:11   Procedure changed from CT HEAD FOR STROKE     Edited Result - FINAL by Rani Kenny MD (04/03/24 20:30:11)                   Narrative:         ADDENDUM #1       STROKE CODE RESULT: Pertinent findings were communicated by phone to Dr. Vignesh Jarquin on 4/3/2024 at 8:27 PM CDT.    Electronically signed by:  Rani Kenny MD  04/03/2024 08:30 PM CDT Workstation: [id number]       ORIGINAL REPORT       EXAM DESCRIPTION:  CT HEAD WITHOUT IV CONTRAST    CLINICAL HISTORY:  Neuro deficit, acute, stroke suspected. Numbness in right hand. Also history yesterday afternoon, only lasted a few minutes. Over 2 days has been mixing upwards and just feels off.    TECHNIQUE:  Multiple axial images were obtained through the brain without intravenous contrast. Coronal and sagittal images were reconstructed.  All CT scans at this facility use dose modulation, iterative reconstruction, and/or weight based dosing when appropriate to reduce radiation dose to as low as reasonably achievable.    COMPARISON: None.    FINDINGS:    Of note, the most inferior portion of the posterior fossa is not included on the scan, with the inferior margin of the cerebellum and brain stem not  included.    There is no evidence of acute intracranial hemorrhage or acute major territorial infarction.  The ventricles, sulci and cisterns are normal in size. There is no midline shift. No abnormal extra-axial fluid collections are seen.  The brain parenchyma is grossly unremarkable. Vascular calcifications.    The visualized portions of the paranasal sinuses are clear bilaterally.  The mastoid air cells are clear bilaterally.  The calvarium appears grossly unremarkable.      IMPRESSION:  1.  No evidence of acute intracranial abnormality.  2.  Of note, the most inferior portion of the posterior fossa is not included on the scan, with the inferior margin of the cerebellum and brain stem not included. If there is any clinical concern for pathology within the floor of the posterior fossa, repeat CT could be considered.    Electronically signed by:  Rani Kenny MD  04/03/2024 08:17 PM CDT Workstation: [id number]                      Final result by Rani Kenny MD (04/03/24 20:17:59)                   Narrative:    EXAM DESCRIPTION:  CT HEAD WITHOUT IV CONTRAST    CLINICAL HISTORY:  Neuro deficit, acute, stroke suspected. Numbness in right hand. Also history yesterday afternoon, only lasted a few minutes. Over 2 days has been mixing upwards and just feels off.    TECHNIQUE:  Multiple axial images were obtained through the brain without intravenous contrast. Coronal and sagittal images were reconstructed.  All CT scans at this facility use dose modulation, iterative reconstruction, and/or weight based dosing when appropriate to reduce radiation dose to as low as reasonably achievable.    COMPARISON: None.    FINDINGS:    Of note, the most inferior portion of the posterior fossa is not included on the scan, with the inferior margin of the cerebellum and brain stem not included.    There is no evidence of acute intracranial hemorrhage or acute major territorial infarction.  The ventricles, sulci and cisterns  are normal in size. There is no midline shift. No abnormal extra-axial fluid collections are seen.  The brain parenchyma is grossly unremarkable. Vascular calcifications.    The visualized portions of the paranasal sinuses are clear bilaterally.  The mastoid air cells are clear bilaterally.  The calvarium appears grossly unremarkable.      IMPRESSION:  1.  No evidence of acute intracranial abnormality.  2.  Of note, the most inferior portion of the posterior fossa is not included on the scan, with the inferior margin of the cerebellum and brain stem not included. If there is any clinical concern for pathology within the floor of the posterior fossa, repeat CT could be considered.    Electronically signed by:  Rani Kenny MD  04/03/2024 08:17 PM CDT Workstation: [id number]                                (radiology reading, visualized by me)    ED Course as of 04/03/24 2159 Wed Apr 03, 2024 2029 EKG:  Sinus rhythm with sinus arrhythmia, rate of 92, normal intervals and axis.  Nonspecific ST abnormalities with no significant ST elevation or depression or consecutive pathologic T-wave inversions.  No prior for comparison. (Independently interpreted by me)   [MR]   2120 Patient remains asymptomatic with no new recurrent symptoms on reassessment. [MR]      ED Course User Index  [MR] Vignesh Jarquin MD       MDM:    63 y.o. female with intermittent right hand paresthesia with possible dysarthria not currently present.  Intermittent TIA or CVA considered with no focal deficit here.  Patient remains asymptomatic here.  Initial head CT without acute process.  There are no cerebellar signs and I doubt posterior CVA.  No objective alternative etiology although patient does endorse anxiety as potential contributor.  She declines anxiolytic medication here.  I will admit for further observation and potential MRI to exclude CVA.    Diagnoses:    1. Episodic right upper extremity paresthesia and dysarthria        Vignesh Jarquin MD  04/03/24 3850

## 2024-04-04 NOTE — TELEPHONE ENCOUNTER
Pt is at [facility] picking up medication. Asking about medication she discussed with MD, says there was 2 different kinds of BP meds that they discussed. Unable to find mention in notes of any other BP medications. Did confirm that this is the medication that patient got a dose of in ED and reiterated the prescriptions that were sent in. Instructed to take medication as prescribed and to follow up with PCP to see if any changes in medication were needed. Pt v/u.   Reason for Disposition   Health information question, no triage required and triager able to answer question    Protocols used: Information Only Call - No Triage-A-[place]

## 2024-04-04 NOTE — PT/OT/SLP EVAL
Speech Language Pathology Evaluation  Cognitive/Bedside Swallow    Patient Name:  Nell Smith   MRN:  [id number]  Admitting Diagnosis: Acute focal neurological deficit    Recommendations:                  General Recommendations:  Follow-up not indicated  Diet recommendations:  Regular, Thin   Aspiration Precautions: Standard aspiration precautions   General Precautions: Standard,    Communication strategies:  none    Assessment:     Nell Smith is a 63 y.o. female with an admitting diagnosis of Acute focal neurological deficit. Clinical swallow and cognitive communication evaluations completed. Oropharyngeal swallow judged to be WNL. REC Regular textures (IDDSI 7) with thin liquids. Speech, language and cognition appear to be at baseline and WNL. Skilled ST not indicated at this time. Please re consult PRN.       History:   PER HPI: Patient is a 63-year-old female with a past medical history of anxiety and diabetes.  Patient presents to the ED today with complaints of right hand paresthesias and speech difficulty intermittently over the past 2 days.  Patient's family at bedside and report she did not have slurred speech but did have difficulty finding words on 2 separate occasions, once yesterday and once today.  On arrival to the ED, patient's blood pressure 212/104.  Patient denies taking chronic blood pressure medications regularly.  Patient reports she is recently weaned off of Zoloft, has been off completely for approximate 3 weeks.  Patient reports only taking OTC supplements, metformin, and insulin regularly.  Patient reports she does not like taking medications and would rather control with diet.  Patient does report feeling she has had increased anxiety since weaning from medication.  Patient does have history of right shoulder pain, frozen shoulder diagnosis in the past.  Patient denies cervical pain or recent injury.  Stat CT of the head was completed in the ED which shows no acute abnormalities.  No  tPA given due to being out of window.  Patient denies chest pain, nausea, vomiting, abdominal pain, shortness of breath, lower extremity edema, headache or other neurological symptoms.  EKG shows sinus rhythm with marked sinus arrhythmia.      In ED:  TSH 5.132, triglycerides 277, LDL 88.6, potassium 4.1, glucose 130, hemoglobin 10.4, INR 0.9.  No past medical history on file.    Past Surgical History:   Procedure Laterality Date     SECTION       SECTION       SECTION       SECTION      WISDOM TOOTH EXTRACTION      at age 18; x 4       Social History: Patient lives with  and daughter.    Prior Intubation HX:  NA    Modified Barium Swallow: NA    Imaging:  Imaging Results              US Carotid Bilateral (Final result)  Result time 24 22:54:54      Final result by Rani Kenny MD (24 22:54:54)                   Narrative:    EXAM DESCRIPTION:  US CAROTID DOPPLER BILATERAL    CLINICAL HISTORY:  Velocities evaluation.    TECHNIQUE:  Real time grey scale and Doppler ultrasound were utilized to evaluate the bilateral carotid arteries.    COMPARISON: None.    FINDINGS:    All velocities in cm/sec.    RIGHT CAROTID:  Morphology: No significant plaque.  Peak Systolic Velocities (PSV) (Normal <125):  Proximal CCA: 57  Proximal ICA: 30  Mid ICA: 41  Distal ICA: 42  ECA: 65  Right vertebral: Antegrade flow, 41  ICA/CCA PSV ratio: 0.7 (Normal <2.0)  End Diastolic Velocities (EDV) (Normal <40)  Proximal ICA: 9  Mid ICA: 15  Distal ICA: 22      LEFT CAROTID:  Morphology: No significant plaque.  Peak Systolic Velocities (PSV) (Normal <125):  Proximal CCA: 69  Proximal ICA: 20  Mid ICA: 30  Distal ICA: 25  ECA: 66  Left vertebral: Antegrade flow, 82  ICA/CCA PSV ratio: 0.4 (Normal <2.0)  End Diastolic Velocities (EDV) (Normal <40)  Proximal ICA: 4  Mid ICA: 5  Distal ICA: 8    Some of the tracings demonstrate a somewhat irregular heart  rhythm.    IMPRESSION:    1.  No stenosis of the bilateral carotid arteries.  2.  Irregular heart rhythm.      Electronically signed by:  Rani Kenny MD  04/03/2024 10:54 PM CDT Workstation: [id number]                                     CT Head Without Contrast (Edited Result - FINAL)  Result time 04/03/24 20:30:11   Procedure changed from CT HEAD FOR STROKE     Edited Result - FINAL by Rani Kenny MD (04/03/24 20:30:11)                   Narrative:         ADDENDUM #1       STROKE CODE RESULT: Pertinent findings were communicated by phone to Dr. Vignesh Jarquin on 4/3/2024 at 8:27 PM CDT.    Electronically signed by:  Rani Kenny MD  04/03/2024 08:30 PM CDT Workstation: [id number]       ORIGINAL REPORT       EXAM DESCRIPTION:  CT HEAD WITHOUT IV CONTRAST    CLINICAL HISTORY:  Neuro deficit, acute, stroke suspected. Numbness in right hand. Also history yesterday afternoon, only lasted a few minutes. Over 2 days has been mixing upwards and just feels off.    TECHNIQUE:  Multiple axial images were obtained through the brain without intravenous contrast. Coronal and sagittal images were reconstructed.  All CT scans at this facility use dose modulation, iterative reconstruction, and/or weight based dosing when appropriate to reduce radiation dose to as low as reasonably achievable.    COMPARISON: None.    FINDINGS:    Of note, the most inferior portion of the posterior fossa is not included on the scan, with the inferior margin of the cerebellum and brain stem not included.    There is no evidence of acute intracranial hemorrhage or acute major territorial infarction.  The ventricles, sulci and cisterns are normal in size. There is no midline shift. No abnormal extra-axial fluid collections are seen.  The brain parenchyma is grossly unremarkable. Vascular calcifications.    The visualized portions of the paranasal sinuses are clear bilaterally.  The mastoid air cells are clear bilaterally.  The  calvarium appears grossly unremarkable.      IMPRESSION:  1.  No evidence of acute intracranial abnormality.  2.  Of note, the most inferior portion of the posterior fossa is not included on the scan, with the inferior margin of the cerebellum and brain stem not included. If there is any clinical concern for pathology within the floor of the posterior fossa, repeat CT could be considered.    Electronically signed by:  Rani Kenny MD  04/03/2024 08:17 PM CDT Workstation: [id number]                      Final result by Rani Kenny MD (04/03/24 20:17:59)                   Narrative:    EXAM DESCRIPTION:  CT HEAD WITHOUT IV CONTRAST    CLINICAL HISTORY:  Neuro deficit, acute, stroke suspected. Numbness in right hand. Also history yesterday afternoon, only lasted a few minutes. Over 2 days has been mixing upwards and just feels off.    TECHNIQUE:  Multiple axial images were obtained through the brain without intravenous contrast. Coronal and sagittal images were reconstructed.  All CT scans at this facility use dose modulation, iterative reconstruction, and/or weight based dosing when appropriate to reduce radiation dose to as low as reasonably achievable.    COMPARISON: None.    FINDINGS:    Of note, the most inferior portion of the posterior fossa is not included on the scan, with the inferior margin of the cerebellum and brain stem not included.    There is no evidence of acute intracranial hemorrhage or acute major territorial infarction.  The ventricles, sulci and cisterns are normal in size. There is no midline shift. No abnormal extra-axial fluid collections are seen.  The brain parenchyma is grossly unremarkable. Vascular calcifications.    The visualized portions of the paranasal sinuses are clear bilaterally.  The mastoid air cells are clear bilaterally.  The calvarium appears grossly unremarkable.      IMPRESSION:  1.  No evidence of acute intracranial abnormality.  2.  Of note, the most inferior  "portion of the posterior fossa is not included on the scan, with the inferior margin of the cerebellum and brain stem not included. If there is any clinical concern for pathology within the floor of the posterior fossa, repeat CT could be considered.    Electronically signed by:  Rani Kenny MD  04/03/2024 08:17 PM CDT Workstation: [id number]                                     Prior diet: Regular/thin.    Occupation/hobbies/homemaking: Pt/family report PLOF as independent with ADLS, independent with IADLS. Pt does drive. Level of education is 12th grade. Stays active and watches grandchildren every day. She reports increases stress at home; feels overwhelmed.    Subjective     "I get easily distracted."  "You're making me nervous right now."  Patient goals: To go home     Pain/Comfort:  Pain Rating 1: 0/10    Respiratory Status: Room air    Objective:   Pt seen for clinical swallow and cognitive communication evaluations. She is AAOx4, pleasant and cooperative.  at bedside. She reports speech, language and cognition at baseline. Denies dysphagia. She appears quite anxious.     Cognitive Status:  MoCA (Version 8.1) administered with pt achieving a score of 25/30 suggesting possible mild cognitive-linguistic impairment, however, her performance is judged to be negatively impacted by anxiety. Pt earned 3 of 5 points on visuospatial/executive functions, 3 of 3 points on naming, 5 of 6 points for attention, 3 of 3 points for language, 2 of 2 points for abstraction, 2 of 5 points for delayed recall and 6 of 6 points for orientation.    Attention --Likely adversely affected by anxiousness/nervousness       Receptive Language:   Comprehension:      WFL    Pragmatics:    WFL    Expressive Language:  Verbal:    WFL        Motor Speech:  WFL    Voice:   WFL        Oral Musculature Evaluation  Oral Musculature: WNL  Dentition: present and adequate  Secretion Management: adequate  Mucosal Quality: good  Mandibular " Strength and Mobility: WNL  Oral Labial Strength and Mobility: WNL  Lingual Strength and Mobility: WNL  Velar Elevation: WNL  Buccal Strength and Mobility: WNL  Volitional Cough: adequate  Volitional Swallow: able to palpate laryngeal rise  Voice Prior to PO Intake: clear    Bedside Swallow Eval:   Consistencies Assessed:  Thin liquids --via straw  Puree --applesauce  Mixed consistencies --diced peaches  Solids --nuts      Oral Phase:   WNL    Pharyngeal Phase:   no overt clinical signs/symptoms of aspiration  no overt clinical signs/symptoms of pharyngeal dysphagia    Compensatory Strategies  None    Treatment: Pt/family educated re: results/recs of evaluation. Receptive to information provided.     Plan:     Patient to be seen:      Plan of Care expires:     Plan of Care reviewed with:  patient, spouse   SLP Follow-Up:  No       Discharge recommendations:  Therapy Intensity Recommendations at Discharge: No Therapy Indicated   Barriers to Discharge:  None    Time Tracking:     SLP Treatment Date:   04/04/24  Speech Start Time:  1006  Speech Stop Time:  1034     Speech Total Time (min):  28 min    Billable Minutes: Eval 20 , Eval Swallow and Oral Function 8, and Total Time 28    04/04/2024

## 2024-04-08 ENCOUNTER — TELEPHONE (OUTPATIENT)
Dept: FAMILY MEDICINE | Facility: CLINIC | Age: 64
End: 2024-04-08
Payer: MEDICAID

## 2024-04-08 ENCOUNTER — HOSPITAL ENCOUNTER (INPATIENT)
Facility: HOSPITAL | Age: 64
LOS: 2 days | Discharge: HOME | DRG: 065 | End: 2024-04-10
Attending: EMERGENCY MEDICINE | Admitting: HOSPITALIST
Payer: MEDICAID

## 2024-04-08 DIAGNOSIS — G45.9 TIA (TRANSIENT ISCHEMIC ATTACK): Primary | ICD-10-CM

## 2024-04-08 DIAGNOSIS — I63.9 STROKE: ICD-10-CM

## 2024-04-08 DIAGNOSIS — I63.9 CVA (CEREBRAL VASCULAR ACCIDENT): ICD-10-CM

## 2024-04-08 DIAGNOSIS — R07.9 CHEST PAIN: ICD-10-CM

## 2024-04-08 PROBLEM — E11.9 TYPE 2 DIABETES MELLITUS WITHOUT COMPLICATION, WITH LONG-TERM CURRENT USE OF INSULIN: Status: ACTIVE | Noted: 2024-04-08

## 2024-04-08 PROBLEM — Z79.4 TYPE 2 DIABETES MELLITUS WITHOUT COMPLICATION, WITH LONG-TERM CURRENT USE OF INSULIN: Status: ACTIVE | Noted: 2024-04-08

## 2024-04-08 PROBLEM — I16.0 HYPERTENSIVE URGENCY: Status: ACTIVE | Noted: 2024-04-08

## 2024-04-08 PROBLEM — R74.01 TRANSAMINITIS: Status: ACTIVE | Noted: 2024-04-08

## 2024-04-08 LAB
ALBUMIN SERPL BCP-MCNC: 4.8 G/DL (ref 3.5–5.2)
ALP SERPL-CCNC: 188 U/L (ref 55–135)
ALT SERPL W/O P-5'-P-CCNC: 371 U/L (ref 10–44)
ANION GAP SERPL CALC-SCNC: 11 MMOL/L (ref 8–16)
AST SERPL-CCNC: 192 U/L (ref 10–40)
BASOPHILS # BLD AUTO: 0.02 K/UL (ref 0–0.2)
BASOPHILS NFR BLD: 0.3 % (ref 0–1.9)
BILIRUB SERPL-MCNC: 1.8 MG/DL (ref 0.1–1)
BUN SERPL-MCNC: 19 MG/DL (ref 8–23)
CALCIUM SERPL-MCNC: 9.2 MG/DL (ref 8.7–10.5)
CHLORIDE SERPL-SCNC: 105 MMOL/L (ref 95–110)
CO2 SERPL-SCNC: 20 MMOL/L (ref 23–29)
CREAT SERPL-MCNC: 0.9 MG/DL (ref 0.5–1.4)
CREAT SERPL-MCNC: 1 MG/DL (ref 0.5–1.4)
DIFFERENTIAL METHOD BLD: ABNORMAL
EOSINOPHIL # BLD AUTO: 0.1 K/UL (ref 0–0.5)
EOSINOPHIL NFR BLD: 1.7 % (ref 0–8)
ERYTHROCYTE [DISTWIDTH] IN BLOOD BY AUTOMATED COUNT: 18.4 % (ref 11.5–14.5)
EST. GFR  (NO RACE VARIABLE): >60 ML/MIN/1.73 M^2
GLUCOSE SERPL-MCNC: 136 MG/DL (ref 70–110)
GLUCOSE SERPL-MCNC: 152 MG/DL (ref 70–110)
HCT VFR BLD AUTO: 30.3 % (ref 37–48.5)
HGB BLD-MCNC: 10.4 G/DL (ref 12–16)
IMM GRANULOCYTES # BLD AUTO: 0.05 K/UL (ref 0–0.04)
IMM GRANULOCYTES NFR BLD AUTO: 0.7 % (ref 0–0.5)
INR PPP: 0.9 (ref 0.8–1.2)
LYMPHOCYTES # BLD AUTO: 0.9 K/UL (ref 1–4.8)
LYMPHOCYTES NFR BLD: 12.6 % (ref 18–48)
MCH RBC QN AUTO: 29.9 PG (ref 27–31)
MCHC RBC AUTO-ENTMCNC: 34.3 G/DL (ref 32–36)
MCV RBC AUTO: 87 FL (ref 82–98)
MONOCYTES # BLD AUTO: 0.3 K/UL (ref 0.3–1)
MONOCYTES NFR BLD: 4.4 % (ref 4–15)
NEUTROPHILS # BLD AUTO: 5.8 K/UL (ref 1.8–7.7)
NEUTROPHILS NFR BLD: 80.3 % (ref 38–73)
NRBC BLD-RTO: 0 /100 WBC
PLATELET # BLD AUTO: 210 K/UL (ref 150–450)
PMV BLD AUTO: 11.2 FL (ref 9.2–12.9)
POTASSIUM SERPL-SCNC: 4 MMOL/L (ref 3.5–5.1)
PROT SERPL-MCNC: 7.5 G/DL (ref 6–8.4)
PROTHROMBIN TIME: 10.3 SEC (ref 9–12.5)
RBC # BLD AUTO: 3.48 M/UL (ref 4–5.4)
SAMPLE: NORMAL
SODIUM SERPL-SCNC: 136 MMOL/L (ref 136–145)
WBC # BLD AUTO: 7.24 K/UL (ref 3.9–12.7)

## 2024-04-08 PROCEDURE — 82962 GLUCOSE BLOOD TEST: CPT

## 2024-04-08 PROCEDURE — 85025 COMPLETE CBC W/AUTO DIFF WBC: CPT

## 2024-04-08 PROCEDURE — G0378 HOSPITAL OBSERVATION PER HR: HCPCS

## 2024-04-08 PROCEDURE — 84484 ASSAY OF TROPONIN QUANT: CPT | Performed by: HOSPITALIST

## 2024-04-08 PROCEDURE — 93010 ELECTROCARDIOGRAM REPORT: CPT | Mod: ,,, | Performed by: GENERAL PRACTICE

## 2024-04-08 PROCEDURE — 25500020 PHARM REV CODE 255: Performed by: EMERGENCY MEDICINE

## 2024-04-08 PROCEDURE — 82565 ASSAY OF CREATININE: CPT

## 2024-04-08 PROCEDURE — 85610 PROTHROMBIN TIME: CPT

## 2024-04-08 PROCEDURE — 99285 EMERGENCY DEPT VISIT HI MDM: CPT | Mod: 25

## 2024-04-08 PROCEDURE — 63600175 PHARM REV CODE 636 W HCPCS: Performed by: HOSPITALIST

## 2024-04-08 PROCEDURE — 96374 THER/PROPH/DIAG INJ IV PUSH: CPT

## 2024-04-08 PROCEDURE — 93005 ELECTROCARDIOGRAM TRACING: CPT | Performed by: GENERAL PRACTICE

## 2024-04-08 PROCEDURE — 36415 COLL VENOUS BLD VENIPUNCTURE: CPT | Performed by: HOSPITALIST

## 2024-04-08 PROCEDURE — 36415 COLL VENOUS BLD VENIPUNCTURE: CPT

## 2024-04-08 PROCEDURE — 80053 COMPREHEN METABOLIC PANEL: CPT

## 2024-04-08 PROCEDURE — 21400001 HC TELEMETRY ROOM

## 2024-04-08 RX ORDER — ALPRAZOLAM 0.5 MG/1
1 TABLET ORAL ONCE
Status: DISCONTINUED | OUTPATIENT
Start: 2024-04-08 | End: 2024-04-10 | Stop reason: HOSPADM

## 2024-04-08 RX ORDER — ACETAMINOPHEN 325 MG/1
650 TABLET ORAL EVERY 4 HOURS PRN
Status: DISCONTINUED | OUTPATIENT
Start: 2024-04-08 | End: 2024-04-10 | Stop reason: HOSPADM

## 2024-04-08 RX ORDER — ASPIRIN 81 MG/1
81 TABLET ORAL DAILY
Status: DISCONTINUED | OUTPATIENT
Start: 2024-04-09 | End: 2024-04-10 | Stop reason: HOSPADM

## 2024-04-08 RX ORDER — ONDANSETRON HYDROCHLORIDE 2 MG/ML
4 INJECTION, SOLUTION INTRAVENOUS EVERY 6 HOURS PRN
Status: DISCONTINUED | OUTPATIENT
Start: 2024-04-08 | End: 2024-04-10 | Stop reason: HOSPADM

## 2024-04-08 RX ORDER — GLUCAGON 1 MG
1 KIT INJECTION
Status: DISCONTINUED | OUTPATIENT
Start: 2024-04-08 | End: 2024-04-09

## 2024-04-08 RX ORDER — SODIUM,POTASSIUM PHOSPHATES 280-250MG
2 POWDER IN PACKET (EA) ORAL
Status: DISCONTINUED | OUTPATIENT
Start: 2024-04-08 | End: 2024-04-10 | Stop reason: HOSPADM

## 2024-04-08 RX ORDER — ATORVASTATIN CALCIUM 40 MG/1
80 TABLET, FILM COATED ORAL NIGHTLY
Status: DISCONTINUED | OUTPATIENT
Start: 2024-04-08 | End: 2024-04-10 | Stop reason: HOSPADM

## 2024-04-08 RX ORDER — LORAZEPAM 2 MG/ML
1 INJECTION INTRAMUSCULAR ONCE
Status: COMPLETED | OUTPATIENT
Start: 2024-04-08 | End: 2024-04-08

## 2024-04-08 RX ORDER — IBUPROFEN 200 MG
16 TABLET ORAL
Status: DISCONTINUED | OUTPATIENT
Start: 2024-04-08 | End: 2024-04-09

## 2024-04-08 RX ORDER — ALUMINUM HYDROXIDE, MAGNESIUM HYDROXIDE, AND SIMETHICONE 1200; 120; 1200 MG/30ML; MG/30ML; MG/30ML
30 SUSPENSION ORAL 4 TIMES DAILY PRN
Status: DISCONTINUED | OUTPATIENT
Start: 2024-04-08 | End: 2024-04-10 | Stop reason: HOSPADM

## 2024-04-08 RX ORDER — CLOPIDOGREL BISULFATE 75 MG/1
75 TABLET ORAL DAILY
Status: DISCONTINUED | OUTPATIENT
Start: 2024-04-09 | End: 2024-04-10 | Stop reason: HOSPADM

## 2024-04-08 RX ORDER — NALOXONE HCL 0.4 MG/ML
0.02 VIAL (ML) INJECTION
Status: DISCONTINUED | OUTPATIENT
Start: 2024-04-08 | End: 2024-04-10 | Stop reason: HOSPADM

## 2024-04-08 RX ORDER — HYDROCODONE BITARTRATE AND ACETAMINOPHEN 5; 325 MG/1; MG/1
1 TABLET ORAL EVERY 6 HOURS PRN
Status: DISCONTINUED | OUTPATIENT
Start: 2024-04-08 | End: 2024-04-10 | Stop reason: HOSPADM

## 2024-04-08 RX ORDER — ACETAMINOPHEN 325 MG/1
650 TABLET ORAL EVERY 8 HOURS PRN
Status: DISCONTINUED | OUTPATIENT
Start: 2024-04-08 | End: 2024-04-10 | Stop reason: HOSPADM

## 2024-04-08 RX ORDER — IBUPROFEN 200 MG
24 TABLET ORAL
Status: DISCONTINUED | OUTPATIENT
Start: 2024-04-08 | End: 2024-04-09

## 2024-04-08 RX ORDER — LANOLIN ALCOHOL/MO/W.PET/CERES
800 CREAM (GRAM) TOPICAL
Status: DISCONTINUED | OUTPATIENT
Start: 2024-04-08 | End: 2024-04-10 | Stop reason: HOSPADM

## 2024-04-08 RX ORDER — SODIUM CHLORIDE 0.9 % (FLUSH) 0.9 %
10 SYRINGE (ML) INJECTION EVERY 12 HOURS PRN
Status: DISCONTINUED | OUTPATIENT
Start: 2024-04-08 | End: 2024-04-10 | Stop reason: HOSPADM

## 2024-04-08 RX ORDER — TALC
6 POWDER (GRAM) TOPICAL NIGHTLY PRN
Status: DISCONTINUED | OUTPATIENT
Start: 2024-04-08 | End: 2024-04-10 | Stop reason: HOSPADM

## 2024-04-08 RX ORDER — AMOXICILLIN 250 MG
1 CAPSULE ORAL DAILY
Status: DISCONTINUED | OUTPATIENT
Start: 2024-04-09 | End: 2024-04-10 | Stop reason: HOSPADM

## 2024-04-08 RX ORDER — LORAZEPAM 2 MG/ML
1 INJECTION INTRAMUSCULAR ONCE
Status: DISCONTINUED | OUTPATIENT
Start: 2024-04-08 | End: 2024-04-08

## 2024-04-08 RX ORDER — ENOXAPARIN SODIUM 100 MG/ML
40 INJECTION SUBCUTANEOUS EVERY 24 HOURS
Status: DISCONTINUED | OUTPATIENT
Start: 2024-04-08 | End: 2024-04-10 | Stop reason: HOSPADM

## 2024-04-08 RX ADMIN — LORAZEPAM 1 MG: 2 INJECTION INTRAMUSCULAR; INTRAVENOUS at 10:04

## 2024-04-08 RX ADMIN — IOHEXOL 100 ML: 350 INJECTION, SOLUTION INTRAVENOUS at 05:04

## 2024-04-08 NOTE — TELEPHONE ENCOUNTER
----- Message from Jeane Sarabia sent at 4/8/2024  2:47 PM CDT -----  Regarding: Returning call  Type:  Patient Returning Call    Who Called:  Daughter  Who Left Message for Patient:  My  Does the patient know what this is regarding?: no  Best Call Back Number:  [phone]    Additional Information:  accidentally hung up call

## 2024-04-08 NOTE — ED PROVIDER NOTES
Encounter Date: 2024       History     Chief Complaint   Patient presents with    Numbness     Numbness to R lasting approx 5 min, slurred speech. Pt was seen here last week for TIA.      HPI    Seen and evaluated.  Presented with an episodic bout of right-sided hand numbness, slurred speech and feeling like her face was pulled to the right.  It had resolved by the time she arrived in the emergency department.  It lasted about 5 minutes in duration.  It is similar to a previous TIA which she noted and had admission for on .  Here today she has currently had resolution of symptoms.  Her pressure remains elevated.  She denies any associated chest pain shortness of breath nausea vomiting or diarrhea.  Symptoms were moderate and have improved significantly.    Review of patient's allergies indicates:   Allergen Reactions    Codeine     Penicillins      No past medical history on file.  Past Surgical History:   Procedure Laterality Date     SECTION       SECTION       SECTION       SECTION      WISDOM TOOTH EXTRACTION      at age 18; x 4     Family History   Problem Relation Age of Onset    Hypertension Mother      Social History     Tobacco Use    Smoking status: Never    Smokeless tobacco: Never   Substance Use Topics    Alcohol use: Not Currently    Drug use: Yes     Comment: CBD oil      Review of Systems   Constitutional:  Negative for fever.   HENT:  Negative for sore throat.    Respiratory:  Negative for shortness of breath.    Cardiovascular:  Negative for chest pain.   Gastrointestinal:  Negative for nausea.   Genitourinary:  Negative for dysuria.   Neurological:  Positive for facial asymmetry (Resolved prior to arrival). Negative for weakness.   All other systems reviewed and are negative.      Physical Exam     Initial Vitals [24 1651]   BP Pulse Resp Temp SpO2   (!) 213/84 87 18 98 °F (36.7 °C) 100 %      MAP       --         Physical  Exam    Nursing note and vitals reviewed.  Constitutional: She appears well-developed and well-nourished.   HENT:   Head: Normocephalic and atraumatic.   Eyes: Conjunctivae are normal.   Cardiovascular:  Normal rate and regular rhythm.           Pulmonary/Chest: Effort normal.   Musculoskeletal:         General: Normal range of motion.     Neurological: She is alert and oriented to person, place, and time.   Skin: Skin is warm and dry.   Psychiatric: She has a normal mood and affect. Her speech is normal.         ED Course   Procedures  Labs Reviewed   CBC W/ AUTO DIFFERENTIAL - Abnormal; Notable for the following components:       Result Value    RBC 3.48 (*)     Hemoglobin 10.4 (*)     Hematocrit 30.3 (*)     RDW 18.4 (*)     Immature Granulocytes 0.7 (*)     Immature Grans (Abs) 0.05 (*)     Lymph # 0.9 (*)     Gran % 80.3 (*)     Lymph % 12.6 (*)     All other components within normal limits   COMPREHENSIVE METABOLIC PANEL - Abnormal; Notable for the following components:    CO2 20 (*)     Glucose 136 (*)     Total Bilirubin 1.8 (*)     Alkaline Phosphatase 188 (*)      (*)      (*)     All other components within normal limits   POCT GLUCOSE - Abnormal; Notable for the following components:    POC Glucose 152 (*)     All other components within normal limits   CBC W/ AUTO DIFFERENTIAL   COMPREHENSIVE METABOLIC PANEL   PROTIME-INR   PROTIME-INR   ISTAT CREATININE   POCT GLUCOSE MONITORING CONTINUOUS     EKG Readings: (Independently Interpreted)   No STEMI, chest pain-free, inferolateral T-wave inversions normal sinus rhythm 83 beats per minute       Imaging Results              CTA Head and Neck (xpd) (Final result)  Result time 04/08/24 18:45:00      Final result by Vignesh Do MD (04/08/24 18:45:00)                   Impression:      1. Up to 50% luminal narrowing in the cavernous right ICA due to plaque.  Up to 50% luminal narrowing at the junction of the right cavernous and  supraclinoid ICA segments, possibly on a congenital basis as there is no calcified plaque seen in the region.  2. No aneurysm or hemodynamically significant stenosis elsewhere along the head and neck arterial vasculature.  3. Nodule versus cyst along the right thyroid lobe.  This could be further characterized with ultrasound in an elective setting as warranted.      Electronically signed by: Vignesh Do  Date:    04/08/2024  Time:    18:45               Narrative:    EXAMINATION:  CTA HEAD AND NECK (XPD)    CLINICAL HISTORY:  Neuro deficit, acute, stroke suspected;    TECHNIQUE:  Non contrast low dose axial images were obtained through the head.  CT angiogram was performed from the level of the neda to the top of the head following the IV administration of 100mL of Omnipaque 350.   Sagittal and coronal reconstructions and maximum intensity projection reconstructions were performed. Arterial stenosis percentages are based on NASCET measurement criteria.    COMPARISON:  None    FINDINGS:  No abnormal intracranial enhancement.  No hydrocephalus.  Globes normal contents are unremarkable.  Mild mucosal thickening of both ethmoid air cells and left maxillary sinus.  Mastoid air cells are clear.  Left maxillary periodontal disease.  No mass or abnormal enhancement of the aero digestive tract.  Subtle hypodense lesion right thyroid gland measures 1.2 cm series 2, image 72.  Prominent adenoid tonsillar tissue.  Remaining glandular tissue in the neck unremarkable.  Straightening of normal cervical lordosis with advanced degenerative disc disease at C6-7 and additional degenerative change at several other spinal levels.    Normal 3 vessel arch.  Right common carotid artery and carotid bulb are patent.  Right ICA exhibits plaque along the distal cavernous segment with up to 50% stenosis.  Up to 50% luminal narrowing in the supraclinoid ICA over a subcentimeter segment as seen axial series 2 images 222-224.  Left common  carotid artery and carotid bulb are patent.  There is plaque along the cavernous left ICA with less than 50% stenosis.  There is narrowing at the junction of the cavernous and supraclinoid ICA segments on the left with less than 50% stenosis.  Bilateral MCAs and ACAs are patent.  There is an anterior communicating artery.    Right vertebral artery origin and course are patent.  Patient is left vertebral artery dominant.  Left vertebral artery origin and course are patent.  Basilar artery and PCAs are patent.  There is a small right posterior communicating artery.  No left posterior communicating artery is visualized.                                       CT HEAD FOR STROKE (Final result)  Result time 04/08/24 17:39:07      Final result by Vignesh Do MD (04/08/24 17:39:07)                   Impression:      No intracranial hemorrhage.  No evidence for recent ischemia, noting however that MRI could add sensitivity in an acute setting.      Electronically signed by: Vignesh Do  Date:    04/08/2024  Time:    17:39               Narrative:    EXAMINATION:  CT HEAD FOR STROKE    CLINICAL HISTORY:  Neuro deficit, acute, stroke suspected;    TECHNIQUE:  Low dose axial images were obtained through the head.  Coronal and sagittal reformations were also performed. Contrast was not administered.    COMPARISON:  04/03/2024    FINDINGS:  Normal size of the ventricular system. No hemorrhage or major vascular distribution infarct. No intra or extra-axial mass. No mass effect or midline shift. Included orbits are unremarkable. Paranasal sinuses and mastoid air cells are clear. No acute osseous abnormality.  Atherosclerosis of the carotid siphons.                                       Medications   iohexoL (OMNIPAQUE 350) injection 100 mL (100 mLs Intravenous Given 4/8/24 7342)     Medical Decision Making  Seen and evaluated.  Presented with a chief complaint of right hand paresthesias and twisting of her face which  has resolved.  This is similar to previous TIA for which she was admitted several days ago.  She denies any additional complaints.  She reports no chest pain.  EKG did show T-wave inversions inferiorly and laterally.  This could be related to hypertension.  She not have chest pain any other symptoms at the time of evaluation.  Given that she did express the right hand numbness and twisting of her face which could be her TIA equivalent, a consult was placed to neurology.  She will be admitted to the medicine service for further evaluation.  She is hemodynamically stable and pending the results of diagnostic studies.                                      Clinical Impression:  Final diagnoses:  [I63.9] Stroke                 Aguila Barnett Jr., MD  04/08/24 [id number]

## 2024-04-08 NOTE — TELEPHONE ENCOUNTER
----- Message from Anisha Blevins sent at 4/8/2024  2:37 PM CDT -----  Regarding: advise  Contact: pt  Type: Needs Medical Advice  Who Called:  patient   Symptoms (please be specific): was admitted in the hosp   How long has patient had these symptoms:    Pharmacy name and phone #:    Best Call Back Number: [phone].    Additional Information: she needs a referral for a open mri referral needs an apt for virtual or in person this week call to advise

## 2024-04-09 ENCOUNTER — CLINICAL SUPPORT (OUTPATIENT)
Dept: CARDIOLOGY | Facility: HOSPITAL | Age: 64
DRG: 065 | End: 2024-04-09
Attending: HOSPITALIST
Payer: MEDICAID

## 2024-04-09 VITALS — WEIGHT: 137.81 LBS | BODY MASS INDEX: 24.42 KG/M2 | HEIGHT: 63 IN

## 2024-04-09 PROBLEM — I63.9 ACUTE CVA (CEREBROVASCULAR ACCIDENT): Status: ACTIVE | Noted: 2024-04-09

## 2024-04-09 PROBLEM — G45.9 TIA (TRANSIENT ISCHEMIC ATTACK): Status: RESOLVED | Noted: 2024-04-08 | Resolved: 2024-04-09

## 2024-04-09 LAB
ALBUMIN SERPL BCP-MCNC: 4.1 G/DL (ref 3.5–5.2)
ALP SERPL-CCNC: 143 U/L (ref 55–135)
ALT SERPL W/O P-5'-P-CCNC: 249 U/L (ref 10–44)
ANION GAP SERPL CALC-SCNC: 5 MMOL/L (ref 8–16)
AORTIC ROOT ANNULUS: 3.4 CM
AORTIC VALVE CUSP SEPERATION: 1.7 CM
AST SERPL-CCNC: 84 U/L (ref 10–40)
AV INDEX (PROSTH): 0.73
AV MEAN GRADIENT: 4 MMHG
AV PEAK GRADIENT: 8 MMHG
AV VALVE AREA BY VELOCITY RATIO: 2.11 CM²
AV VALVE AREA: 2.06 CM²
AV VELOCITY RATIO: 0.74
BASOPHILS # BLD AUTO: 0.02 K/UL (ref 0–0.2)
BASOPHILS NFR BLD: 0.3 % (ref 0–1.9)
BILIRUB SERPL-MCNC: 1.6 MG/DL (ref 0.1–1)
BSA FOR ECHO PROCEDURE: 1.67 M2
BUN SERPL-MCNC: 16 MG/DL (ref 8–23)
CALCIUM SERPL-MCNC: 9 MG/DL (ref 8.7–10.5)
CHLORIDE SERPL-SCNC: 109 MMOL/L (ref 95–110)
CO2 SERPL-SCNC: 25 MMOL/L (ref 23–29)
CREAT SERPL-MCNC: 0.7 MG/DL (ref 0.5–1.4)
CV ECHO LV RWT: 0.51 CM
DIFFERENTIAL METHOD BLD: ABNORMAL
DOP CALC AO PEAK VEL: 1.45 M/S
DOP CALC AO VTI: 29.3 CM
DOP CALC LVOT AREA: 2.8 CM2
DOP CALC LVOT DIAMETER: 1.9 CM
DOP CALC LVOT PEAK VEL: 1.08 M/S
DOP CALC LVOT STROKE VOLUME: 60.36 CM3
DOP CALC MV VTI: 23.9 CM
DOP CALCLVOT PEAK VEL VTI: 21.3 CM
E WAVE DECELERATION TIME: 158 MSEC
E/A RATIO: 0.55
E/E' RATIO: 10 M/S
ECHO LV POSTERIOR WALL: 1.09 CM (ref 0.6–1.1)
EOSINOPHIL # BLD AUTO: 0.1 K/UL (ref 0–0.5)
EOSINOPHIL NFR BLD: 1.7 % (ref 0–8)
ERYTHROCYTE [DISTWIDTH] IN BLOOD BY AUTOMATED COUNT: 18.1 % (ref 11.5–14.5)
EST. GFR  (NO RACE VARIABLE): >60 ML/MIN/1.73 M^2
FRACTIONAL SHORTENING: 27 % (ref 28–44)
GLUCOSE SERPL-MCNC: 109 MG/DL (ref 70–110)
HCT VFR BLD AUTO: 26.6 % (ref 37–48.5)
HGB BLD-MCNC: 8.8 G/DL (ref 12–16)
IMM GRANULOCYTES # BLD AUTO: 0.04 K/UL (ref 0–0.04)
IMM GRANULOCYTES NFR BLD AUTO: 0.6 % (ref 0–0.5)
INTERVENTRICULAR SEPTUM: 0.93 CM (ref 0.6–1.1)
IVC DIAMETER: 1.03 CM
LEFT INTERNAL DIMENSION IN SYSTOLE: 3.14 CM (ref 2.1–4)
LEFT VENTRICLE DIASTOLIC VOLUME INDEX: 50.61 ML/M2
LEFT VENTRICLE DIASTOLIC VOLUME: 83.5 ML
LEFT VENTRICLE MASS INDEX: 88 G/M2
LEFT VENTRICLE SYSTOLIC VOLUME INDEX: 23.7 ML/M2
LEFT VENTRICLE SYSTOLIC VOLUME: 39.1 ML
LEFT VENTRICULAR INTERNAL DIMENSION IN DIASTOLE: 4.31 CM (ref 3.5–6)
LEFT VENTRICULAR MASS: 145.01 G
LV LATERAL E/E' RATIO: 8.13 M/S
LV SEPTAL E/E' RATIO: 13 M/S
LVOT MG: 3 MMHG
LVOT MV: 0.73 CM/S
LYMPHOCYTES # BLD AUTO: 1.1 K/UL (ref 1–4.8)
LYMPHOCYTES NFR BLD: 16.6 % (ref 18–48)
MAGNESIUM SERPL-MCNC: 2.3 MG/DL (ref 1.6–2.6)
MCH RBC QN AUTO: 29.1 PG (ref 27–31)
MCHC RBC AUTO-ENTMCNC: 33.1 G/DL (ref 32–36)
MCV RBC AUTO: 88 FL (ref 82–98)
MONOCYTES # BLD AUTO: 0.3 K/UL (ref 0.3–1)
MONOCYTES NFR BLD: 5.3 % (ref 4–15)
MV MEAN GRADIENT: 3 MMHG
MV PEAK A VEL: 1.19 M/S
MV PEAK E VEL: 0.65 M/S
MV PEAK GRADIENT: 7 MMHG
MV VALVE AREA BY CONTINUITY EQUATION: 2.53 CM2
NEUTROPHILS # BLD AUTO: 4.8 K/UL (ref 1.8–7.7)
NEUTROPHILS NFR BLD: 75.5 % (ref 38–73)
NRBC BLD-RTO: 0 /100 WBC
OHS LV EJECTION FRACTION SIMPSONS BIPLANE MOD: 36 %
PISA MRMAX VEL: 3.66 M/S
PISA TR MAX VEL: 2.1 M/S
PLATELET # BLD AUTO: 193 K/UL (ref 150–450)
PMV BLD AUTO: 10.8 FL (ref 9.2–12.9)
POTASSIUM SERPL-SCNC: 3.9 MMOL/L (ref 3.5–5.1)
PROT SERPL-MCNC: 6.5 G/DL (ref 6–8.4)
PV MV: 0.83 M/S
PV PEAK GRADIENT: 5 MMHG
PV PEAK VELOCITY: 1.14 M/S
RA PRESSURE ESTIMATED: 3 MMHG
RBC # BLD AUTO: 3.02 M/UL (ref 4–5.4)
RIGHT VENTRICULAR END-DIASTOLIC DIMENSION: 2.2 CM
RV TB RVSP: 5 MMHG
RV TISSUE DOPPLER FREE WALL SYSTOLIC VELOCITY 1 (APICAL 4 CHAMBER VIEW): 21.9 CM/S
SODIUM SERPL-SCNC: 139 MMOL/L (ref 136–145)
TDI LATERAL: 0.08 M/S
TDI SEPTAL: 0.05 M/S
TDI: 0.07 M/S
TR MAX PG: 18 MMHG
TRICUSPID ANNULAR PLANE SYSTOLIC EXCURSION: 2.83 CM
TROPONIN I SERPL HS-MCNC: 22.3 PG/ML (ref 0–14.9)
TROPONIN I SERPL HS-MCNC: 25.3 PG/ML (ref 0–14.9)
TV REST PULMONARY ARTERY PRESSURE: 21 MMHG
WBC # BLD AUTO: 6.38 K/UL (ref 3.9–12.7)
Z-SCORE OF LEFT VENTRICULAR DIMENSION IN END DIASTOLE: -0.72
Z-SCORE OF LEFT VENTRICULAR DIMENSION IN END SYSTOLE: 0.71

## 2024-04-09 PROCEDURE — 97535 SELF CARE MNGMENT TRAINING: CPT

## 2024-04-09 PROCEDURE — 99900035 HC TECH TIME PER 15 MIN (STAT)

## 2024-04-09 PROCEDURE — 63600175 PHARM REV CODE 636 W HCPCS: Performed by: HOSPITALIST

## 2024-04-09 PROCEDURE — 93306 TTE W/DOPPLER COMPLETE: CPT

## 2024-04-09 PROCEDURE — 80053 COMPREHEN METABOLIC PANEL: CPT | Performed by: HOSPITALIST

## 2024-04-09 PROCEDURE — 97161 PT EVAL LOW COMPLEX 20 MIN: CPT

## 2024-04-09 PROCEDURE — 93306 TTE W/DOPPLER COMPLETE: CPT | Mod: 26,,, | Performed by: INTERNAL MEDICINE

## 2024-04-09 PROCEDURE — 25000003 PHARM REV CODE 250: Performed by: HOSPITALIST

## 2024-04-09 PROCEDURE — 83735 ASSAY OF MAGNESIUM: CPT | Performed by: HOSPITALIST

## 2024-04-09 PROCEDURE — 99900031 HC PATIENT EDUCATION (STAT)

## 2024-04-09 PROCEDURE — 21400001 HC TELEMETRY ROOM

## 2024-04-09 PROCEDURE — 92526 ORAL FUNCTION THERAPY: CPT

## 2024-04-09 PROCEDURE — 85025 COMPLETE CBC W/AUTO DIFF WBC: CPT | Performed by: HOSPITALIST

## 2024-04-09 PROCEDURE — 92610 EVALUATE SWALLOWING FUNCTION: CPT

## 2024-04-09 PROCEDURE — 97165 OT EVAL LOW COMPLEX 30 MIN: CPT

## 2024-04-09 PROCEDURE — 84484 ASSAY OF TROPONIN QUANT: CPT | Performed by: HOSPITALIST

## 2024-04-09 PROCEDURE — 94761 N-INVAS EAR/PLS OXIMETRY MLT: CPT

## 2024-04-09 PROCEDURE — 36415 COLL VENOUS BLD VENIPUNCTURE: CPT | Performed by: HOSPITALIST

## 2024-04-09 PROCEDURE — 94799 UNLISTED PULMONARY SVC/PX: CPT

## 2024-04-09 RX ORDER — IBUPROFEN 200 MG
24 TABLET ORAL
Status: DISCONTINUED | OUTPATIENT
Start: 2024-04-09 | End: 2024-04-10 | Stop reason: HOSPADM

## 2024-04-09 RX ORDER — INSULIN ASPART 100 [IU]/ML
0-10 INJECTION, SOLUTION INTRAVENOUS; SUBCUTANEOUS
Status: DISCONTINUED | OUTPATIENT
Start: 2024-04-09 | End: 2024-04-10 | Stop reason: HOSPADM

## 2024-04-09 RX ORDER — INSULIN DEGLUDEC 100 U/ML
14 INJECTION, SOLUTION SUBCUTANEOUS
Status: DISCONTINUED | OUTPATIENT
Start: 2024-04-10 | End: 2024-04-10 | Stop reason: HOSPADM

## 2024-04-09 RX ORDER — IBUPROFEN 200 MG
16 TABLET ORAL
Status: DISCONTINUED | OUTPATIENT
Start: 2024-04-09 | End: 2024-04-10 | Stop reason: HOSPADM

## 2024-04-09 RX ORDER — GLUCAGON 1 MG
1 KIT INJECTION
Status: DISCONTINUED | OUTPATIENT
Start: 2024-04-09 | End: 2024-04-10 | Stop reason: HOSPADM

## 2024-04-09 RX ADMIN — CLOPIDOGREL BISULFATE 75 MG: 75 TABLET, FILM COATED ORAL at 06:04

## 2024-04-09 RX ADMIN — INSULIN DEGLUDEC INJECTION 14 UNITS: 100 INJECTION, SOLUTION SUBCUTANEOUS at 08:04

## 2024-04-09 RX ADMIN — ENOXAPARIN SODIUM 40 MG: 40 INJECTION SUBCUTANEOUS at 06:04

## 2024-04-09 RX ADMIN — Medication 81 MG: at 09:04

## 2024-04-09 NOTE — CONSULTS
[facility]  Department of Neurology  Neurology Consultation Note        PATIENT NAME: Nell Smith  MRN: [id number]  CSN: [id number]      TODAY'S DATE: 04/09/2024  ADMIT DATE: 4/8/2024                            CONSULTING PROVIDER: Modesto Olivera MD  CONSULT REQUESTED BY: Julius Toussaint MD      Reason for consult: CVA       History obtained from chart review and the patient.    HPI per EMR: Nell Smith is a 63 y.o. female with a history of anxiety, hypertension, and diabetes presenting to the ER because of right hand numbness, slurred speech, and right facial droop.  Symptoms started at 4:30 p.m. today, and lasted for 5-10 minutes.  She checked her blood pressure prior to the symptoms, and it was elevated at 176/79.  Symptoms  Completely resolved prior to making it to the ER.  Per records, this happened to the patient's prior, and she was actually admitted on April 4 because of the same symptoms.  At that time, she was seen by Neurology.  They recommended MRI brain, but Patient declined MRI brain and further imaging secondary to claustrophobia.  Per records, Patient refused care and left AMA.. Patient's blood pressure was elevated during her last hospitalization and Norvasc was started.  She was also prescribed Plavix, aspirin and Lipitor per Neurology recommendations.  She comes back to the hospital today, requesting further workup bec of recurrent symptoms.      On arrival to the ER, vitals showed a blood pressure of 213/84, pulse of 87, respiratory rate of 18, afebrile.  CBC showed normocytic anemia.  CMP showed elevated LFTs with AST being 192, ALT being 371.  CT head was no acute intracranial process.  CTA head and neck showed No aneurysm or hemodynamically significant stenosis elsewhere along the head and neck arterial vasculature.  According to the ER provider, he did discuss the case with Neurology who recommended to allow for permissive hypertension, and they will see the patient on  consultation after admission. Pt will be admitted to medicine for further care.    Neurology consult:  Patient was seen and examined by me.  She presented to the hospital with right facial droop, slurred speech, numbness in the right hand.  Symptoms started at about 4:30 p.m. yesterday and lasted for about 10 minutes and improved.  Patient was recently seen at the hospital on 4/for when she presented with episode of word-finding difficulty and numbness on the right upper extremity.  CT head was done at that time was negative and MRI brain, CT angiogram head and neck was recommended however patient refused to get it done and she left AMA.    Now she presented back to the hospital with the above symptoms.  He denies any vision trouble, dizziness, lightheadedness, nausea vomiting, weakness in the extremities.  Currently she feels back to her baseline.    PREVIOUS MEDICAL HISTORY:  Past Medical History:   Diagnosis Date    Anxiety disorder, unspecified     Diabetes mellitus     Hypertension      PREVIOUS SURGICAL HISTORY:  Past Surgical History:   Procedure Laterality Date     SECTION       SECTION       SECTION       SECTION      WISDOM TOOTH EXTRACTION      at age 18; x 4     FAMILY MEDICAL HISTORY:  Family History   Problem Relation Age of Onset    Hypertension Mother      SOCIAL HISTORY:  Social History     Tobacco Use    Smoking status: Never    Smokeless tobacco: Never   Substance Use Topics    Alcohol use: Not Currently    Drug use: Never     Comment: CBD oil      ALLERGIES:  Review of patient's allergies indicates:   Allergen Reactions    Codeine     Penicillins      HOME MEDICATIONS:  Prior to Admission medications    Medication Sig Start Date End Date Taking? Authorizing Provider   amLODIPine (NORVASC) 5 MG tablet Take 1 tablet (5 mg total) by mouth once daily. 24 Yes Alyssa Kay PA-C   aspirin (ECOTRIN) 81 MG EC tablet Take 1 tablet (81 mg total)  by mouth once daily. 4/5/24 4/5/25 Yes Alyssa Kay PA-C   clopidogreL (PLAVIX) 75 mg tablet Take 1 tablet (75 mg total) by mouth once daily. for 21 days 4/4/24 4/25/24 Yes Alyssa Kay PA-C   metFORMIN (FORTAMET) 500 mg 24hr tablet Take 1 tablet (500 mg total) by mouth 2 (two) times a day. Take 1 tablet orally twice daily 12/28/21  Yes Ivy Rainey MD   RED YEAST RICE ORAL Take 1 capsule by mouth 2 (two) times a day.   Yes Provider, Historical   TRESIBA FLEXTOUCH U-100 100 unit/mL (3 mL) insulin pen Inject 14 Units into the skin once daily. 8/24/21  Yes Provider, Historical   atorvastatin (LIPITOR) 40 MG tablet Take 2 tablets (80 mg total) by mouth once daily. 4/4/24 5/4/24  Alyssa Kay PA-C   conjugated estrogens (PREMARIN) vaginal cream Place 1 g vaginally daily as needed.    Provider, Historical   insulin aspart U-100 (NOVOLOG) 100 unit/mL (3 mL) InPn pen Inject 2 Units into the skin 2 (two) times daily with meals. 4/23/21   Provider, Historical   progesterone micronized (CRINONE) 4 % Gel Place 1 application  vaginally daily as needed.    Provider, Historical   sertraline (ZOLOFT) 100 MG tablet Take 1 tablet (100 mg total) by mouth once daily.  Patient not taking: Reported on 4/3/2024 3/6/23 3/5/24  Deepti Kennedy MD     CURRENT SCHEDULED MEDICATIONS:   ALPRAZolam  1 mg Oral Once    aspirin  81 mg Oral Daily    atorvastatin  80 mg Oral QHS    clopidogreL  75 mg Oral Daily    enoxparin  40 mg Subcutaneous Daily    insulin detemir U-100  14 Units Subcutaneous Daily    senna-docusate 8.6-50 mg  1 tablet Oral Daily     CURRENT INFUSIONS:    CURRENT PRN MEDICATIONS:  acetaminophen, acetaminophen, aluminum-magnesium hydroxide-simethicone, dextrose 50%, dextrose 50%, glucagon (human recombinant), glucose, glucose, HYDROcodone-acetaminophen, magnesium oxide, magnesium oxide, melatonin, naloxone, ondansetron, potassium bicarbonate, potassium bicarbonate, potassium bicarbonate, potassium, sodium  "phosphates, potassium, sodium phosphates, potassium, sodium phosphates, sodium chloride 0.9%    REVIEW OF SYSTEMS:  Please refer to the HPI for all pertinent positive and negative findings. A comprehensive review of all other systems was negative.       PHYSICAL EXAM:  Patient Vitals for the past 24 hrs:   BP Temp Temp src Pulse Resp SpO2 Height Weight   04/09/24 0943 -- -- -- 68 17 100 % -- --   04/09/24 0600 (!) 151/67 98.4 °F (36.9 °C) Oral 71 14 98 % -- --   04/09/24 0500 (!) 167/73 -- -- 71 18 98 % -- --   04/09/24 0400 132/63 -- -- 73 (!) 23 99 % -- --   04/09/24 0300 134/63 -- -- 66 (!) 60 98 % -- --   04/09/24 0200 133/65 -- -- 67 16 98 % -- --   04/09/24 0136 (!) 176/84 98 °F (36.7 °C) Oral 73 17 100 % -- --   04/09/24 0130 (!) 199/93 -- -- 73 -- 100 % -- --   04/09/24 0116 -- -- -- 79 16 99 % -- --   04/09/24 0100 (!) 199/93 -- -- -- -- -- -- --   04/08/24 2346 -- -- -- -- -- -- 5' 3" (1.6 m) 62.5 kg (137 lb 12.8 oz)   04/08/24 2342 136/61 98.2 °F (36.8 °C) Oral 83 19 97 % -- --   04/08/24 2246 (!) 169/72 98 °F (36.7 °C) Oral 73 20 100 % -- --   04/08/24 2206 -- -- -- -- (!) 21 -- -- --   04/08/24 2201 (!) 171/73 -- -- 75 -- 100 % -- --   04/08/24 2031 (!) 175/74 -- -- 75 -- 100 % -- --   04/08/24 2001 (!) 146/64 -- -- 84 -- 99 % -- --   04/08/24 1956 (!) 171/72 -- -- 83 19 99 % -- --   04/08/24 1930 (!) 198/120 -- -- 79 -- 100 % -- --   04/08/24 1907 -- -- -- -- (!) 22 -- -- --   04/08/24 1906 (!) 189/74 -- -- 79 -- 100 % -- --   04/08/24 1831 (!) 213/85 -- -- 80 -- 100 % -- --   04/08/24 1801 (!) 191/79 -- -- 78 19 100 % -- --   04/08/24 1736 (!) 179/82 -- -- 82 18 100 % -- --   04/08/24 1651 (!) 213/84 98 °F (36.7 °C) Oral 87 18 100 % 5' 3.5" (1.613 m) 58.1 kg (128 lb)       GENERAL APPEARANCE: Alert, well-developed, well-nourished female in no acute distress.  HEENT: Normocephalic and atraumatic. PERRL. Oropharynx unremarkable.  PULM: Normal respiratory effort. No accessory muscle use.  CV: " RRR.  ABDOMEN: Soft, nontender.  EXTREMITIES: No obvious signs of vascular compromise. Pulses present. No cyanosis, clubbing or edema.  SKIN: Clear; no rashes, lesions or skin breaks in exposed areas.    NEURO:  MENTAL STATUS: Patient awake and oriented to time, place, and person, recent/remote memory normal, attention span/concentration normal, and speech fluent without paraphasic errors.  Affect euthymic.    CRANIAL NERVES:  CN I: Not tested.  CN II: Fundoscopic exam deferred.  CN III, IV, VI: Pupils equal, round and reactive to light.  Extraocular movements full and intact.  CN V: Facial sensation normal.  CN VII: Facial asymmetry absent.  CN VIII: Hearing grossly normal and equal bilaterally.  No skew deviation or pathologic nystagmus.  CN IX, X: Palate elevates symmetrically. Speech/articulation is clear without dysarthria.  CN XI: Shoulder shrug and chin rotation equal with good strength.  CN XII: Tongue protrusion midline.    MOTOR:  Bulk normal. Tone normal and symmetric throughout.  Abnormal movements absent.  Tremor: none present.  Strength 5/5 throughout.    REFLEXES:  DTRs 1+ throughout.  Plantar response equivocal bilaterally.  SENSATION: grossly intact throughout.  COORDINATION: normal finger-to-nose.  STATION: not tested.  GAIT: not tested.      NIHSS:  1a      Level of Consciousness (alert, drowsy, etc.):   0=alert; keenly responsive  1b.     Level of Consciousness Questions (month, age): 0= able to answer both questions  1c.     Level of Consciousness Commands (open, close eyes, make fist, let go):  0=Answers both tasks correctly  2.      Best Gaze (eyes open - patient follows examiner's finger or face):      0=normal  3.      Visual (introduce visual stimulus/threat to patient's visual field quadrants):  0=No visual loss  4.      Facial Palsy (show teeth, raise eyebrows and squeeze eyes shut):        0=Normal symmetric movement  5a.     Motor Arm - Left (elevate extremity 90 degrees and score  drift/movement):       0=No drift, limb holds 90 (or 45) degrees for full 10 seconds  5b.     Motor Arm - Right (elevate extremity 90 degrees and score drift/movement):      0=No drift, limb holds 90 (or 45) degrees for full 10 seconds  6a.     Motor Leg - Left (elevate extremity 30 degrees and score drift/movement):       0=No drift, limb holds 90 (or 45) degrees for full 10 seconds  6b      Motor Leg - Right (elevate extremity 30 degrees and score drift/movement):      0=No drift, limb holds 90 (or 45) degrees for full 10 seconds  7.      Limb Ataxia (finger-nose, heel down shin):      0=Absent  8.      Sensory (pin prick to face, arm, trunk, and leg - compare side to side):        0=Normal; no sensory loss  9.      Best Language (name items, describe a picture and read sentences):      0=No aphasia, normal  10.     Dysarthria (evaluate speech clarity by patient repeating listed words): 0=Normal  11.     Extinction and Inattention (use prior testing to identify neglect or double simultaneous stimuli testing):      0=No abnormality          NIH Stroke Scale Total:         0      Labs:  Recent Labs   Lab 04/04/24 0444 04/08/24 1703 04/09/24 0426    136 139   K 3.9 4.0 3.9    105 109   CO2 23 20* 25   BUN 18 19 16   CREATININE 0.7 0.9 0.7   * 136* 109   CALCIUM 9.5 9.2 9.0   PHOS 3.9  --   --    MG 2.1  --  2.3     Recent Labs   Lab 04/04/24 0444 04/08/24  1703 04/09/24  0426   WBC 6.90 7.24 6.38   HGB 10.1* 10.4* 8.8*   HCT 30.1* 30.3* 26.6*    210 193     Recent Labs   Lab 04/04/24 0444 04/08/24 1703 04/09/24  0426   ALBUMIN 4.5 4.8 4.1   PROT 6.8 7.5 6.5   BILITOT 1.0 1.8* 1.6*   ALKPHOS 56 188* 143*   ALT 11 371* 249*   AST 19 192* 84*     Lab Results   Component Value Date    INR 0.9 04/08/2024     Lab Results   Component Value Date    TRIG 277 (H) 04/03/2024    HDL 42 04/03/2024    CHOLHDL 22.6 04/03/2024     Lab Results   Component Value Date    HGBA1C 4.1 (L) 04/03/2024     No  "results found for: "PROTEINCSF", "GLUCCSF", "WBCCSF"    Imaging:  I have reviewed and interpreted the pertinent imaging and lab results.      US Abdomen Limited  Narrative: EXAMINATION:  US ABDOMEN LIMITED    CLINICAL HISTORY:  transaminitis;    TECHNIQUE:  Limited ultrasound of the right upper quadrant of the abdomen (including pancreas, liver, gallbladder, common bile duct, and spleen) was performed.    COMPARISON:  None.    FINDINGS:  Liver: Normal in size, measuring 13.5 cm.  No focal abnormality of the visualized hepatic parenchyma.    Gallbladder: Gallbladder is distended.  There is a large shadowing calculus identified in the gallbladder lumen.  Additionally, there is a significant volume of echogenic material throughout the gallbladder lumen with possible increased vascularity.  This may potentially represent large volume of biliary sludge, although gallbladder neoplasm not excluded.  The technologist reports a negative sonographic Jamison sign, noting assessment is limited by prior administration of analgesic medication.    Biliary system: The common duct is not dilated, measuring 6 mm.  No intrahepatic ductal dilatation.    Right kidney: No evidence of hydronephrosis..    Pancreas: Partially obscured by bowel gas.  Visualized portion appears grossly within normal limits.    Miscellaneous: No upper abdominal ascites.  Impression: Large calculus within the gallbladder lumen as well as extensive echogenic material throughout the gallbladder lumen with possible increased vascularity.  Evaluation of sonographic Jamison sign is reported negative, although limited by prior administration of analgesic medication.  Consider further evaluation with contrast-enhanced CT or MRI/MRCP to exclude underlying acute cholecystitis and/or gallbladder neoplasm.    This report was flagged in Epic as abnormal.    Electronically signed by: Vignesh Pugh MD  Date:    04/09/2024  Time:    01:58         ASSESSMENT & PLAN:      Acute " ischemic infarct   Hypertension     Workup  CTH: No acute intracranial abnormality   CTA head and neck:  No large vessel occlusion or high-grade stenosis  MRI brain:  Punctate acute infarcts in the left frontal, parietal lobes  ECHO:  EF 55%, negative for PFO, normal left atrium.  LDL: 88        Plan  Admitted for further stroke workup.  Etiology of stroke is unknown   Stroke prevention with aspirin 81 mg, Plavix 75 mg and Lipitor 80 mg.  Dual antiplatelet therapy for 3 weeks followed by monotherapy with Plavix alone.  Permissive BP to 220 systolic for 24 hrs from symptom onset and after that normalize BP  Transesophageal echocardiogram moderate.    If transesophageal echo is negative, recommend outpatient event monitor for 4 weeks to rule out AFib.  PT OT  Speech therapy  DVT prophylaxis with chemo/SCD prophylaxis  Discussed lifestyle modifications as prophylactic measures for stroke prevention including, adequate blood pressure management, healthy diet and regular exercise.          Thank you kindly for including us in the care of this patient. Please do not hesitate to contact us with any questions.             Modesto Olivera MD  Neurology/vascular Neurology  Date of Service: 04/09/2024  10:14 AM    --------------------------------------------------------------------------------------------------------------------------------------------------------------------------------------------------------------------------------------------------------------  Please note: This note was transcribed using voice recognition software. Because of this technology there are often uinintended grammatical, spelling, and other transcription errors. Please disregard these errors.

## 2024-04-09 NOTE — PROGRESS NOTES
Count includes the [facility] Medicine  Progress Note    Patient Name: Nell Smith  MRN: [id number]  Patient Class: IP- Inpatient   Admission Date: 4/8/2024  Length of Stay: 0 days  Attending Physician: Julius Toussaint MD  Primary Care Provider: Deepti Kennedy MD        Subjective:     Principal Problem:Acute CVA (cerebrovascular accident)        HPI:  63-year-old female with past medical history of anxiety, hypertension, and diabetes presenting to the ER because of right hand numbness, slurred speech, and right facial droop.  Symptoms started at 4:30 p.m. today, and lasted for 5-10 minutes.  She checked her blood pressure prior to the symptoms, and it was elevated at 176/79.  Symptoms  Completely resolved prior to making it to the ER.  Per records, this happened to the patient's prior, and she was actually admitted on April 4 because of the same symptoms.  At that time, she was seen by Neurology.  They recommended MRI brain, but Patient declined MRI brain and further imaging secondary to claustrophobia.  Per records, Patient refused care and left AMA.. Patient's blood pressure was elevated during her last hospitalization and Norvasc was started.  She was also prescribed Plavix, aspirin and Lipitor per Neurology recommendations.  She comes back to the hospital today, requesting further workup bec of recurrent symptoms.     On arrival to the ER, vitals showed a blood pressure of 213/84, pulse of 87, respiratory rate of 18, afebrile.  CBC showed normocytic anemia.  CMP showed elevated LFTs with AST being 192, ALT being 371.  CT head was no acute intracranial process.  CTA head and neck showed No aneurysm or hemodynamically significant stenosis elsewhere along the head and neck arterial vasculature.  According to the ER provider, he did discuss the case with Neurology who recommended to allow for permissive hypertension, and they will see the patient on consultation after admission. Pt will be admitted to  medicine for further care.    Overview/Hospital Course:  No notes on file    Interval History:     63-year-old  female with history of  IDDM, hypertension, hyperlipidemia presenting here for sudden onset of slurred speech and right upper arm numbness and tingling.  MRI showed Several punctate acute infarcts in the left cerebral hemisphere. patient had hypertensive emergency upon admission.  Patient recently has similar presentation but signed out AMA.     Seen in the multidisciplinary rounds, patient right upper arm numbness tingling improved, slurred speech resolved, no fever or chills, no nausea vomiting diarrhea, abdominal pain.  Patient is known to have gallstones.     Review of Systems   Constitutional:  Negative for activity change and fever.   HENT:  Negative for ear discharge, facial swelling and sore throat.    Eyes:  Negative for photophobia, pain and visual disturbance.   Respiratory:  Negative for apnea, cough and shortness of breath.    Cardiovascular:  Negative for chest pain and leg swelling.   Gastrointestinal:  Negative for abdominal pain and blood in stool.   Endocrine: Negative for cold intolerance and heat intolerance.   Musculoskeletal:  Negative for back pain and gait problem.   Skin:  Negative for pallor and rash.   Neurological:  Positive for speech difficulty, weakness and numbness. Negative for headaches.   Psychiatric/Behavioral:  Negative for confusion, hallucinations and suicidal ideas.    All other systems reviewed and are negative.    Objective:     Vital Signs (Most Recent):  Temp: 98.4 °F (36.9 °C) (04/09/24 0600)  Pulse: 68 (04/09/24 0943)  Resp: 17 (04/09/24 0943)  BP: (!) 151/67 (04/09/24 0600)  SpO2: 100 % (04/09/24 0943) Vital Signs (24h Range):  Temp:  [98 °F (36.7 °C)-98.4 °F (36.9 °C)] 98.4 °F (36.9 °C)  Pulse:  [66-87] 68  Resp:  [14-60] 17  SpO2:  [97 %-100 %] 100 %  BP: (132-213)/() 151/67     Weight: 62.5 kg (137 lb 12.8 oz)  Body mass index is 24.41  kg/m².    Intake/Output Summary (Last 24 hours) at 4/9/2024 1038  Last data filed at 4/9/2024 0501  Gross per 24 hour   Intake 120 ml   Output 200 ml   Net -80 ml         Physical Exam  Constitutional:       Appearance: Normal appearance.   HENT:      Head: Normocephalic and atraumatic.      Nose: Nose normal.   Eyes:      Extraocular Movements: Extraocular movements intact.      Pupils: Pupils are equal, round, and reactive to light.   Cardiovascular:      Rate and Rhythm: Normal rate and regular rhythm.      Heart sounds: No murmur heard.  Pulmonary:      Effort: Pulmonary effort is normal.      Breath sounds: Normal breath sounds.   Abdominal:      General: Abdomen is flat.      Palpations: Abdomen is soft.   Musculoskeletal:         General: Normal range of motion.      Cervical back: Normal range of motion and neck supple.   Skin:     General: Skin is warm and dry.   Neurological:      General: No focal deficit present.      Mental Status: She is alert and oriented to person, place, and time.   Psychiatric:         Mood and Affect: Mood normal.             Significant Labs: All pertinent labs within the past 24 hours have been reviewed.  CBC:   Recent Labs   Lab 04/08/24  1703 04/09/24  0426   WBC 7.24 6.38   HGB 10.4* 8.8*   HCT 30.3* 26.6*    193     CMP:   Recent Labs   Lab 04/08/24  1703 04/09/24  0426    139   K 4.0 3.9    109   CO2 20* 25   * 109   BUN 19 16   CREATININE 0.9 0.7   CALCIUM 9.2 9.0   PROT 7.5 6.5   ALBUMIN 4.8 4.1   BILITOT 1.8* 1.6*   ALKPHOS 188* 143*   * 84*   * 249*   ANIONGAP 11 5*       Significant Imaging: I have reviewed all pertinent imaging results/findings within the past 24 hours.  I have reviewed and interpreted all pertinent imaging results/findings within the past 24 hours.    US Abdomen Limited    Result Date: 4/9/2024  EXAMINATION: US ABDOMEN LIMITED CLINICAL HISTORY: transaminitis; TECHNIQUE: Limited ultrasound of the right upper  quadrant of the abdomen (including pancreas, liver, gallbladder, common bile duct, and spleen) was performed. COMPARISON: None. FINDINGS: Liver: Normal in size, measuring 13.5 cm.  No focal abnormality of the visualized hepatic parenchyma. Gallbladder: Gallbladder is distended.  There is a large shadowing calculus identified in the gallbladder lumen.  Additionally, there is a significant volume of echogenic material throughout the gallbladder lumen with possible increased vascularity.  This may potentially represent large volume of biliary sludge, although gallbladder neoplasm not excluded.  The technologist reports a negative sonographic Jamison sign, noting assessment is limited by prior administration of analgesic medication. Biliary system: The common duct is not dilated, measuring 6 mm.  No intrahepatic ductal dilatation. Right kidney: No evidence of hydronephrosis.. Pancreas: Partially obscured by bowel gas.  Visualized portion appears grossly within normal limits. Miscellaneous: No upper abdominal ascites.     Large calculus within the gallbladder lumen as well as extensive echogenic material throughout the gallbladder lumen with possible increased vascularity.  Evaluation of sonographic Jamison sign is reported negative, although limited by prior administration of analgesic medication.  Consider further evaluation with contrast-enhanced CT or MRI/MRCP to exclude underlying acute cholecystitis and/or gallbladder neoplasm. This report was flagged in Epic as abnormal. Electronically signed by: Vignesh Pugh MD Date:    04/09/2024 Time:    01:58    MRI Brain Without Contrast    Result Date: 4/8/2024  EXAMINATION: MRI BRAIN WITHOUT CONTRAST CLINICAL HISTORY: Transient ischemic attack (TIA); TECHNIQUE: Multiplanar multisequence MR imaging of the brain was performed without intravenous contrast. COMPARISON: CTA head and neck from earlier today taken at 17:19. FINDINGS: Mild motion artifact.  There are small foci of  restricted diffusion in the left frontal and parietal lobes, compatible with acute infarcts.  There are 3 punctate foci in total.  There are mild chronic microvascular ischemic changes.  Ventricles are normal in size for age without evidence of hydrocephalus.  No intracranial mass or hemorrhage.  No extra-axial blood or fluid collections. Normal vascular flow voids. Bone marrow signal intensity is normal. Paranasal sinuses and mastoid air cells are clear.     Several punctate acute infarcts in the left cerebral hemisphere as above. This report was flagged in Epic as abnormal. Dr. Bryant discussed critical findings with Dr. Alonzo by telephone at 23:54 on 04/08/2024. Electronically signed by: Elio Bryant Date:    04/08/2024 Time:    23:56    CTA Head and Neck (xpd)    Result Date: 4/8/2024  EXAMINATION: CTA HEAD AND NECK (XPD) CLINICAL HISTORY: Neuro deficit, acute, stroke suspected; TECHNIQUE: Non contrast low dose axial images were obtained through the head.  CT angiogram was performed from the level of the neda to the top of the head following the IV administration of 100mL of Omnipaque 350.   Sagittal and coronal reconstructions and maximum intensity projection reconstructions were performed. Arterial stenosis percentages are based on NASCET measurement criteria. COMPARISON: None FINDINGS: No abnormal intracranial enhancement.  No hydrocephalus.  Globes normal contents are unremarkable.  Mild mucosal thickening of both ethmoid air cells and left maxillary sinus.  Mastoid air cells are clear.  Left maxillary periodontal disease.  No mass or abnormal enhancement of the aero digestive tract.  Subtle hypodense lesion right thyroid gland measures 1.2 cm series 2, image 72.  Prominent adenoid tonsillar tissue.  Remaining glandular tissue in the neck unremarkable.  Straightening of normal cervical lordosis with advanced degenerative disc disease at C6-7 and additional degenerative change at several other spinal  levels. Normal 3 vessel arch.  Right common carotid artery and carotid bulb are patent.  Right ICA exhibits plaque along the distal cavernous segment with up to 50% stenosis.  Up to 50% luminal narrowing in the supraclinoid ICA over a subcentimeter segment as seen axial series 2 images 222-224.  Left common carotid artery and carotid bulb are patent.  There is plaque along the cavernous left ICA with less than 50% stenosis.  There is narrowing at the junction of the cavernous and supraclinoid ICA segments on the left with less than 50% stenosis.  Bilateral MCAs and ACAs are patent.  There is an anterior communicating artery. Right vertebral artery origin and course are patent.  Patient is left vertebral artery dominant.  Left vertebral artery origin and course are patent.  Basilar artery and PCAs are patent.  There is a small right posterior communicating artery.  No left posterior communicating artery is visualized.     1. Up to 50% luminal narrowing in the cavernous right ICA due to plaque.  Up to 50% luminal narrowing at the junction of the right cavernous and supraclinoid ICA segments, possibly on a congenital basis as there is no calcified plaque seen in the region. 2. No aneurysm or hemodynamically significant stenosis elsewhere along the head and neck arterial vasculature. 3. Nodule versus cyst along the right thyroid lobe.  This could be further characterized with ultrasound in an elective setting as warranted. Electronically signed by: Vignesh Do Date:    04/08/2024 Time:    18:45    CT HEAD FOR STROKE    Result Date: 4/8/2024  EXAMINATION: CT HEAD FOR STROKE CLINICAL HISTORY: Neuro deficit, acute, stroke suspected; TECHNIQUE: Low dose axial images were obtained through the head.  Coronal and sagittal reformations were also performed. Contrast was not administered. COMPARISON: 04/03/2024 FINDINGS: Normal size of the ventricular system. No hemorrhage or major vascular distribution infarct. No intra or  extra-axial mass. No mass effect or midline shift. Included orbits are unremarkable. Paranasal sinuses and mastoid air cells are clear. No acute osseous abnormality.  Atherosclerosis of the carotid siphons.     No intracranial hemorrhage.  No evidence for recent ischemia, noting however that MRI could add sensitivity in an acute setting. Electronically signed by: Vignesh Do Date:    04/08/2024 Time:    17:39    US Carotid Bilateral    Result Date: 4/3/2024  EXAM DESCRIPTION: US CAROTID DOPPLER BILATERAL CLINICAL HISTORY: Velocities evaluation. TECHNIQUE: Real time grey scale and Doppler ultrasound were utilized to evaluate the bilateral carotid arteries. COMPARISON: None. FINDINGS: All velocities in cm/sec. RIGHT CAROTID: Morphology: No significant plaque. Peak Systolic Velocities (PSV) (Normal <125): Proximal CCA: 57 Proximal ICA: 30 Mid ICA: 41 Distal ICA: 42 ECA: 65 Right vertebral: Antegrade flow, 41 ICA/CCA PSV ratio: 0.7 (Normal <2.0) End Diastolic Velocities (EDV) (Normal <40) Proximal ICA: 9 Mid ICA: 15 Distal ICA: 22 LEFT CAROTID: Morphology: No significant plaque. Peak Systolic Velocities (PSV) (Normal <125): Proximal CCA: 69 Proximal ICA: 20 Mid ICA: 30 Distal ICA: 25 ECA: 66 Left vertebral: Antegrade flow, 82 ICA/CCA PSV ratio: 0.4 (Normal <2.0) End Diastolic Velocities (EDV) (Normal <40) Proximal ICA: 4 Mid ICA: 5 Distal ICA: 8 Some of the tracings demonstrate a somewhat irregular heart rhythm. IMPRESSION: 1.  No stenosis of the bilateral carotid arteries. 2.  Irregular heart rhythm. Electronically signed by:  Rani Kenny MD  04/03/2024 10:54 PM CDT Workstation: [id number]    CT Head Without Contrast    Result Date: 4/3/2024   ADDENDUM #1 STROKE CODE RESULT: Pertinent findings were communicated by phone to Dr. Vignesh Jarquin on 4/3/2024 at 8:27 PM CDT. Electronically signed by:  Rani Kenny MD  04/03/2024 08:30 PM CDT Workstation: [id number]  ORIGINAL REPORT EXAM DESCRIPTION: CT  HEAD WITHOUT IV CONTRAST CLINICAL HISTORY: Neuro deficit, acute, stroke suspected. Numbness in right hand. Also history yesterday afternoon, only lasted a few minutes. Over 2 days has been mixing upwards and just feels off. TECHNIQUE: Multiple axial images were obtained through the brain without intravenous contrast. Coronal and sagittal images were reconstructed. All CT scans at this facility use dose modulation, iterative reconstruction, and/or weight based dosing when appropriate to reduce radiation dose to as low as reasonably achievable. COMPARISON: None. FINDINGS: Of note, the most inferior portion of the posterior fossa is not included on the scan, with the inferior margin of the cerebellum and brain stem not included. There is no evidence of acute intracranial hemorrhage or acute major territorial infarction. The ventricles, sulci and cisterns are normal in size. There is no midline shift. No abnormal extra-axial fluid collections are seen. The brain parenchyma is grossly unremarkable. Vascular calcifications. The visualized portions of the paranasal sinuses are clear bilaterally. The mastoid air cells are clear bilaterally. The calvarium appears grossly unremarkable. IMPRESSION: 1.  No evidence of acute intracranial abnormality. 2.  Of note, the most inferior portion of the posterior fossa is not included on the scan, with the inferior margin of the cerebellum and brain stem not included. If there is any clinical concern for pathology within the floor of the posterior fossa, repeat CT could be considered. Electronically signed by:  Rani Kenny MD  04/03/2024 08:17 PM CDT Workstation: [id number]  - pulls last radiology orders      Assessment/Plan:      * Acute CVA (cerebrovascular accident)    Antithrombotics for secondary stroke prevention: Antiplatelets: Aspirin: 81 mg daily  Clopidogrel: 75 mg daily    Statins for secondary stroke prevention and hyperlipidemia, if present:   Statins: Atorvastatin-  80 mg daily    Aggressive risk factor modification: HTN, DM, HLD     Rehab efforts: The patient has been evaluated by a stroke team provider and the therapy needs have been fully considered based off the presenting complaints and exam findings. The following therapy evaluations are needed: PT evaluate and treat, OT evaluate and treat, SLP evaluate and treat    Diagnostics ordered/pending: HgbA1C to assess blood glucose levels, Lipid Profile to assess cholesterol levels, TTE to assess cardiac function/status     VTE prophylaxis: Enoxaparin 40 mg SQ every 24 hours    BP parameters: Infarct: No intervention, SBP <220      Patient most likely has multiple punctuate CVA secondary to uncontrolled hypertension diabetes,  patient also has mild carotid artery disease.   Continue aspirin Plavix and Lipitor.  Get PT OT evaluation, speech therapy evaluation  We will get 2D echo as well  Continue cardiac monitor  Neurology evaluation      Type 2 diabetes mellitus without complication, with long-term current use of insulin  Patient take Tresiba 14 units daily and metformin.    Will order Lantus 14 units in the morning.  Also Humalog sliding scale.    Hypertensive urgency  Blood pressure of 213/94 on arrival to the ER.  She is currently on small dose of Norvasc at home.  Per sign-out from the ER physician, Neurology recommended permissive hypertension so will hold off resuming norvasc.    Transaminitis  New compared to few days ago.  Patient does not have abdominal pain, nausea or vomiting.  Abdominal sonogram reviewed, patient is completely asymptomatic.  We will order MRCP, even though patient may have cholelithiasis versus choledocholithiasis, patient is on aspirin and Plavix, may be difficult for any surgical intervention.         VTE Risk Mitigation (From admission, onward)           Ordered     enoxaparin injection 40 mg  Daily         04/08/24 2209     IP VTE HIGH RISK PATIENT  Once         04/08/24 2209     Place  sequential compression device  Until discontinued         04/08/24 0589                    Discharge Planning   NAUN: 4/11/2024     Code Status: Full Code   Is the patient medically ready for discharge?:     Reason for patient still in hospital (select all that apply): Patient trending condition and Treatment                 Julius Toussaint MD  Department of Hospital Medicine   [facility]

## 2024-04-09 NOTE — ASSESSMENT & PLAN NOTE
CT head, and CTA head and neck results were reviewed.  Patient is now agreeable for MRI brain for further stroke workup  Consult Neurology.  EKG showed normal sinus rhythm with T-wave inversion inferior leads which is actually new compared to the EKG done on April 3rd.  Will order 2D echo, and admit patient to tele.  Will order 2 sets troponin. Pt will likely need holter monitor on discharge.  Resume aspirin, Plavix, statin.

## 2024-04-09 NOTE — NURSING
Ambulated to BR, standby assist. Denies any facial or ext numbness or tingling.   Pt requesting to take her own home meds. States that she is a self pay and that she has her meds with her. Informed her that I would notify MD and not to take any meds until I clarify with MD. Voiced understanding.  at side.

## 2024-04-09 NOTE — CARE UPDATE
04/09/24 0116   Patient Assessment/Suction   Level of Consciousness (AVPU) alert   Respiratory Effort Unlabored   Expansion/Accessory Muscles/Retractions expansion symmetric   All Lung Fields Breath Sounds clear   Cough Frequency infrequent   Cough Type good;nonproductive   PRE-TX-O2   Device (Oxygen Therapy) room air   SpO2 99 %   Pulse Oximetry Type Continuous   $ Pulse Oximetry - Multiple Charge Pulse Oximetry - Multiple   Pulse 79   Resp 16   Education   $ Education DME Oxygen;15 min

## 2024-04-09 NOTE — PT/OT/SLP EVAL
Physical Therapy Evaluation and Discharge Note    Patient Name:  Nell Smith   MRN:  [id number]    Recommendations:     Discharge Recommendations: No Therapy Indicated  Discharge Equipment Recommendations: none   Barriers to discharge:  medical status    Assessment:     Nell Smith is a 63 y.o. female admitted with a medical diagnosis of Acute CVA (cerebrovascular accident). .  At this time, patient is functioning at their prior level of function and does not require further acute PT services.     Recent Surgery: * No surgery found *      Plan:     During this hospitalization, patient does not require further acute PT services.  Please re-consult if situation changes.      Subjective     Chief Complaint: none stated  Patient/Family Comments/goals: go home  Pain/Comfort:  Pain Rating 1: 0/10    Patients cultural, spiritual, Congregation conflicts given the current situation: no    Living Environment:  Pt lives with her  in a two story home with 3 EDUARDO with single HR.   Prior to admission, patients level of function was Independent no AD.  Equipment used at home: none.  DME owned (not currently used): none.  Upon discharge, patient will have assistance from .    Objective:     Communicated with RN prior to session.  Patient found ambulatory in room/soriano with pulse ox (continuous), telemetry, blood pressure cuff, peripheral IV upon PT entry to room.    General Precautions: Standard, other (see comments) (other)    Orthopedic Precautions:N/A   Braces: N/A  Respiratory Status: Room air    Exams:  Gross Motor Coordination:  WFL  Sensation:    -       Intact  RLE ROM: WFL  RLE Strength: WFL  LLE ROM: WFL  LLE Strength: WFL    Functional Mobility:  Bed Mobility:     Sit to Supine: independence  Transfers:     Sit to Stand:  independence with no AD  Gait: 150 ft with no AD and supervision no loss of balance or shortness of breath    AM-PAC 6 CLICK MOBILITY  Total Score:23       Treatment and Education:  Pt  educated on POC, discharge recommendation, importance of time OOB, pacing/energy conservation, use of call bell to seek assistance as needed and fall prevention      AM-PAC 6 CLICK MOBILITY  Total Score:23     Patient left HOB elevated with all lines intact, call button in reach, bed alarm on, and  present.    GOALS:   Multidisciplinary Problems       Physical Therapy Goals       Not on file                    History:     Past Medical History:   Diagnosis Date    Anxiety disorder, unspecified     Diabetes mellitus     Hypertension        Past Surgical History:   Procedure Laterality Date     SECTION       SECTION       SECTION       SECTION      WISDOM TOOTH EXTRACTION      at age 18; x 4       Time Tracking:     PT Received On: 24  PT Start Time: 1021     PT Stop Time: 1025  PT Total Time (min): 4 min     Billable Minutes: Evaluation 4      2024

## 2024-04-09 NOTE — H&P
[facility] - Emergency Dept  Hospital Medicine  History & Physical    Patient Name: Nell Smith  MRN: [id number]  Patient Class: OP- Observation  Admission Date: 4/8/2024  Attending Physician: Isabelle Alonzo MD   Primary Care Provider: Deepti Kennedy MD         Patient information was obtained from patient, spouse/SO, past medical records, and ER records.     Subjective:     Principal Problem:TIA (transient ischemic attack)    Chief Complaint:   Chief Complaint   Patient presents with    Numbness     Numbness to R lasting approx 5 min, slurred speech. Pt was seen here last week for TIA.         HPI: 63-year-old female with past medical history of anxiety, hypertension, and diabetes presenting to the ER because of right hand numbness, slurred speech, and right facial droop.  Symptoms started at 4:30 p.m. today, and lasted for 5-10 minutes.  She checked her blood pressure prior to the symptoms, and it was elevated at 176/79.  Symptoms  Completely resolved prior to making it to the ER.  Per records, this happened to the patient's prior, and she was actually admitted on April 4 because of the same symptoms.  At that time, she was seen by Neurology.  They recommended MRI brain, but Patient declined MRI brain and further imaging secondary to claustrophobia.  Per records, Patient refused care and left AMA.. Patient's blood pressure was elevated during her last hospitalization and Norvasc was started.  She was also prescribed Plavix, aspirin and Lipitor per Neurology recommendations.  She comes back to the hospital today, requesting further workup bec of recurrent symptoms.     On arrival to the ER, vitals showed a blood pressure of 213/84, pulse of 87, respiratory rate of 18, afebrile.  CBC showed normocytic anemia.  CMP showed elevated LFTs with AST being 192, ALT being 371.  CT head was no acute intracranial process.  CTA head and neck showed No aneurysm or hemodynamically significant stenosis  elsewhere along the head and neck arterial vasculature.  According to the ER provider, he did discuss the case with Neurology who recommended to allow for permissive hypertension, and they will see the patient on consultation after admission. Pt will be admitted to medicine for further care.      PMH; see above    Past Surgical History:   Procedure Laterality Date     SECTION       SECTION       SECTION       SECTION      WISDOM TOOTH EXTRACTION      at age 18; x 4       Review of patient's allergies indicates:   Allergen Reactions    Codeine     Penicillins        No current facility-administered medications on file prior to encounter.     Current Outpatient Medications on File Prior to Encounter   Medication Sig    amLODIPine (NORVASC) 5 MG tablet Take 1 tablet (5 mg total) by mouth once daily.    aspirin (ECOTRIN) 81 MG EC tablet Take 1 tablet (81 mg total) by mouth once daily.    clopidogreL (PLAVIX) 75 mg tablet Take 1 tablet (75 mg total) by mouth once daily. for 21 days    metFORMIN (FORTAMET) 500 mg 24hr tablet Take 1 tablet (500 mg total) by mouth 2 (two) times a day. Take 1 tablet orally twice daily    RED YEAST RICE ORAL Take 1 capsule by mouth 2 (two) times a day.    TRESIBA FLEXTOUCH U-100 100 unit/mL (3 mL) insulin pen Inject 14 Units into the skin once daily.    atorvastatin (LIPITOR) 40 MG tablet Take 2 tablets (80 mg total) by mouth once daily.    conjugated estrogens (PREMARIN) vaginal cream Place 1 g vaginally daily as needed.    insulin aspart U-100 (NOVOLOG) 100 unit/mL (3 mL) InPn pen Inject 2 Units into the skin 2 (two) times daily with meals.    progesterone micronized (CRINONE) 4 % Gel Place 1 application  vaginally daily as needed.    sertraline (ZOLOFT) 100 MG tablet Take 1 tablet (100 mg total) by mouth once daily. (Patient not taking: Reported on 4/3/2024)     Family History       Problem Relation (Age of Onset)    Hypertension Mother           Tobacco Use    Smoking status: Never    Smokeless tobacco: Never   Substance and Sexual Activity    Alcohol use: Not Currently    Drug use: Yes     Comment: CBD oil     Sexual activity: Not on file     Review of Systems   Constitutional:  Negative for chills and fever.   HENT:  Negative for sore throat.    Eyes:  Negative for visual disturbance.   Respiratory:  Negative for cough and shortness of breath.    Cardiovascular:  Negative for chest pain and palpitations.   Gastrointestinal:  Negative for abdominal pain, nausea and vomiting.   Endocrine: Negative for cold intolerance and heat intolerance.   Genitourinary:  Negative for dysuria, frequency and urgency.   Musculoskeletal:  Negative for arthralgias and myalgias.   Skin:  Negative for rash.   Neurological:  Positive for speech difficulty and numbness. Negative for dizziness, syncope and light-headedness.        And right facial droop.   Psychiatric/Behavioral:  Negative for confusion.      Objective:     Vital Signs (Most Recent):  Temp: 98 °F (36.7 °C) (04/08/24 1651)  Pulse: 75 (04/08/24 2031)  Resp: 19 (04/08/24 1956)  BP: (!) 175/74 (04/08/24 2031)  SpO2: 100 % (04/08/24 2031) Vital Signs (24h Range):  Temp:  [98 °F (36.7 °C)] 98 °F (36.7 °C)  Pulse:  [75-87] 75  Resp:  [18-22] 19  SpO2:  [99 %-100 %] 100 %  BP: (146-213)/() 175/74     Weight: 58.1 kg (128 lb)  Body mass index is 22.32 kg/m².     Physical Exam  Vitals reviewed.   Constitutional:       Appearance: Normal appearance.   HENT:      Head: Normocephalic and atraumatic.      Mouth/Throat:      Mouth: Mucous membranes are moist.   Eyes:      Extraocular Movements: Extraocular movements intact.      Conjunctiva/sclera: Conjunctivae normal.      Pupils: Pupils are equal, round, and reactive to light.   Cardiovascular:      Rate and Rhythm: Normal rate and regular rhythm.   Pulmonary:      Effort: Pulmonary effort is normal.      Breath sounds: Normal breath sounds.   Abdominal:       "General: Abdomen is flat. Bowel sounds are normal. There is no distension.      Palpations: Abdomen is soft.      Tenderness: There is no abdominal tenderness.   Musculoskeletal:         General: No swelling or tenderness. Normal range of motion.      Cervical back: Normal range of motion and neck supple.   Skin:     General: Skin is warm.   Neurological:      General: No focal deficit present.      Mental Status: She is alert and oriented to person, place, and time.      Cranial Nerves: No cranial nerve deficit.      Sensory: No sensory deficit.      Motor: No weakness.   Psychiatric:         Mood and Affect: Mood normal.         Behavior: Behavior normal.              CRANIAL NERVES     CN III, IV, VI   Pupils are equal, round, and reactive to light.       Significant Labs: All pertinent labs within the past 24 hours have been reviewed.  CBC:   Recent Labs   Lab 04/08/24  1703   WBC 7.24   HGB 10.4*   HCT 30.3*        CMP:   Recent Labs   Lab 04/08/24  1703      K 4.0      CO2 20*   *   BUN 19   CREATININE 0.9   CALCIUM 9.2   PROT 7.5   ALBUMIN 4.8   BILITOT 1.8*   ALKPHOS 188*   *   *   ANIONGAP 11     Cardiac Markers: No results for input(s): "CKMB", "MYOGLOBIN", "BNP", "TROPISTAT" in the last 48 hours.  Lactic Acid: No results for input(s): "LACTATE" in the last 48 hours.  Magnesium: No results for input(s): "MG" in the last 48 hours.  Troponin: No results for input(s): "TROPONINI", "TROPONINIHS" in the last 48 hours.  TSH:   Recent Labs   Lab 04/03/24  2030   TSH 5.132     Urine Culture: No results for input(s): "LABURIN" in the last 48 hours.  Urine Studies: No results for input(s): "COLORU", "APPEARANCEUA", "PHUR", "SPECGRAV", "PROTEINUA", "GLUCUA", "KETONESU", "BILIRUBINUA", "OCCULTUA", "NITRITE", "UROBILINOGEN", "LEUKOCYTESUR", "RBCUA", "WBCUA", "BACTERIA", "SQUAMEPITHEL", "HYALINECASTS" in the last 48 hours.    Invalid input(s): "WRIGHTSUR"    Significant Imaging: I " have reviewed all pertinent imaging results/findings within the past 24 hours.  Assessment/Plan:     * TIA (transient ischemic attack)  CT head, and CTA head and neck results were reviewed.  Patient is now agreeable for MRI brain for further stroke workup  Consult Neurology.  EKG showed normal sinus rhythm with T-wave inversion inferior leads which is actually new compared to the EKG done on April 3rd.  Will order 2D echo, and admit patient to tele.  Will order 2 sets troponin. Pt will likely need holter monitor on discharge.  Resume aspirin, Plavix, statin.        Hypertensive urgency  Blood pressure of 213/94 on arrival to the ER.  She is currently on small dose of Norvasc at home.  Per sign-out from the ER physician, Neurology recommended permissive hypertension so will hold off resuming norvasc.    Transaminitis  New compared to few days ago.  Patient does not have abdominal pain, nausea or vomiting.  I will order ultrasound right upper quadrant.  Trend LFTs.      Type 2 diabetes mellitus without complication, with long-term current use of insulin  Patient take Tresiba 14 units daily and metformin.    Will order Lantus 14 units in the morning.  Also Humalog sliding scale.      VTE Risk Mitigation (From admission, onward)           Ordered     enoxaparin injection 40 mg  Daily         04/08/24 2209     IP VTE HIGH RISK PATIENT  Once         04/08/24 2209     Place sequential compression device  Until discontinued         04/08/24 2209                       On 04/08/2024, patient should be placed in hospital observation services under my care.             Isabelle Alonzo MD  Department of Hospital Medicine  [facility] - Emergency Dept

## 2024-04-09 NOTE — PLAN OF CARE
[facility]  Initial Discharge Assessment       Primary Care Provider: Deepti Kennedy MD    Admission Diagnosis: TIA (transient ischemic attack) [G45.9]  CVA (cerebral vascular accident) [I63.9]    Admission Date: 4/8/2024  Expected Discharge Date: 4/11/2024    Discharge assessment completed with patient and spouse at bedside. Information verified as correct on facesheet. Patient independent in ADLs, no HH/HD/DME at home/coumadin. Verified no insurance on file - per spouse, in contact with medicaid rep at this time. [facility] FAA packet provided at bedside for additional resources. Discharge plan is home - family to provide transport. CM following for any discharge needs    Transition of Care Barriers: Unisured    Payor: /     Extended Emergency Contact Information  Primary Emergency Contact: NevilleIngrid  Mobile Phone: [phone]  Relation: Daughter  Preferred language: English   needed? No    Discharge Plan A: Home with family  Discharge Plan B: Home      [id number] - [place], [place] - [address] [facility]  [address]  [facility] [address]  Phone: [phone] Fax: [phone]      Initial Assessment (most recent)       Adult Discharge Assessment - 04/09/24 1243          Discharge Assessment    Assessment Type Discharge Planning Assessment     Confirmed/corrected address, phone number and insurance Yes     Confirmed Demographics Correct on Facesheet     Source of Information family;patient     Communicated NAUN with patient/caregiver Yes     Reason For Admission CVA     People in Home spouse     Facility Arrived From: home     Do you expect to return to your current living situation? Yes     Do you have help at home or someone to help you manage your care at home? Yes     Who are your caregiver(s) and their phone number(s)? independent in care/spouse     Prior to hospitilization cognitive status: Alert/Oriented     Current  cognitive status: Alert/Oriented     Walking or Climbing Stairs Difficulty no     Dressing/Bathing Difficulty no     Equipment Currently Used at Home none     Readmission within 30 days? No     Patient currently being followed by outpatient case management? No     Do you currently have service(s) that help you manage your care at home? No     Do you take prescription medications? Yes     Do you have prescription coverage? No     Do you have any problems affording any of your prescribed medications? No     Is the patient taking medications as prescribed? yes     Who is going to help you get home at discharge? family     How do you get to doctors appointments? car, drives self     Are you on dialysis? No     Do you take coumadin? No     Discharge Plan A Home with family     Discharge Plan B Home     DME Needed Upon Discharge  none     Discharge Plan discussed with: Patient     Transition of Care Barriers Unisured

## 2024-04-09 NOTE — NURSING
Nurses Note -- 4 Eyes      4/9/2024   1:15 AM      Skin assessed during: Admit      [x] No Altered Skin Integrity Present    []Prevention Measures Documented      [] Yes- Altered Skin Integrity Present or Discovered   [] LDA Added if Not in Epic (Describe Wound)   [] New Altered Skin Integrity was Present on Admit and Documented in LDA   [] Wound Image Taken    Wound Care Consulted? No    Attending Nurse:  Annette Tucker RN     Second RN/Staff Member:  PA Haynes RN

## 2024-04-09 NOTE — NURSING
04/08/24 23:02   MRI BRAIN WITHOUT CONTRAST Rpt !   !: Data is abnormal  Rpt:View report in Results Review for more information    PASCALE Oviedo MD made aware of MRI results. Received ordered to Tx pt to ICU, will implement.

## 2024-04-09 NOTE — HPI
63-year-old female with past medical history of anxiety, hypertension, and diabetes presenting to the ER because of right hand numbness, slurred speech, and right facial droop.  Symptoms started at 4:30 p.m. today, and lasted for 5-10 minutes.  She checked her blood pressure prior to the symptoms, and it was elevated at 176/79.  Symptoms  Completely resolved prior to making it to the ER.  Per records, this happened to the patient's prior, and she was actually admitted on April 4 because of the same symptoms.  At that time, she was seen by Neurology.  They recommended MRI brain, but Patient declined MRI brain and further imaging secondary to claustrophobia.  Per records, Patient refused care and left AMA.. Patient's blood pressure was elevated during her last hospitalization and Norvasc was started.  She was also prescribed Plavix, aspirin and Lipitor per Neurology recommendations.  She comes back to the hospital today, requesting further workup bec of recurrent symptoms.     On arrival to the ER, vitals showed a blood pressure of 213/84, pulse of 87, respiratory rate of 18, afebrile.  CBC showed normocytic anemia.  CMP showed elevated LFTs with AST being 192, ALT being 371.  CT head was no acute intracranial process.  CTA head and neck showed No aneurysm or hemodynamically significant stenosis elsewhere along the head and neck arterial vasculature.  According to the ER provider, he did discuss the case with Neurology who recommended to allow for permissive hypertension, and they will see the patient on consultation after admission. Pt will be admitted to medicine for further care.

## 2024-04-09 NOTE — CARE UPDATE
04/09/24 0943   Patient Assessment/Suction   Level of Consciousness (AVPU) alert   Respiratory Effort Normal;Unlabored   Expansion/Accessory Muscles/Retractions no use of accessory muscles;no retractions;expansion symmetric   All Lung Fields Breath Sounds clear   Rhythm/Pattern, Respiratory unlabored;depth regular;pattern regular   Cough Frequency no cough   PRE-TX-O2   Device (Oxygen Therapy) room air   SpO2 100 %   Pulse Oximetry Type Continuous   $ Pulse Oximetry - Multiple Charge Pulse Oximetry - Multiple   Pulse 68   Resp 17   Education   $ Education 15 min;Other (see comment)

## 2024-04-09 NOTE — ASSESSMENT & PLAN NOTE
New compared to few days ago.  Patient does not have abdominal pain, nausea or vomiting.  Abdominal sonogram reviewed, patient is completely asymptomatic.  We will order MRCP, even though patient may have cholelithiasis versus choledocholithiasis, patient is on aspirin and Plavix, may be difficult for any surgical intervention.

## 2024-04-09 NOTE — PT/OT/SLP EVAL
Speech Language Pathology Evaluation  Bedside Swallow    Patient Name:  Nell Smith   MRN:  [id number]  Admitting Diagnosis: Acute CVA (cerebrovascular accident)    Recommendations:                 General Recommendations:   Re-administer MOCA from previous admit 24  Diet recommendations:  Regular Diet - IDDSI Level 7, Thin liquids - IDDSI Level 0   Aspiration Precautions: Standard aspiration precautions   General Precautions: Standard,    Communication strategies:  none    Assessment:     Nell Smith is a 63 y.o. female with an admitting dx of TIA and SLP diagnosis of resolved right facial droop and dysarthria. Clinical swallow and speech, language evaluations completed. Oropharyngeal swallow judged to be WFL. REC Regular textures (IDDSI 7) with thin liquids. Speech and language  appear to be at baseline and WFL. Cognition also appears WFL, however, will administer the MOCA (initially administered 24) for comparison, when she is feeling less fatigued.     History:     Past Medical History:   Diagnosis Date    Anxiety disorder, unspecified     Diabetes mellitus     Hypertension        Past Surgical History:   Procedure Laterality Date     SECTION       SECTION       SECTION       SECTION      WISDOM TOOTH EXTRACTION      at age 18; x 4       Social History: Patient lives with  and daughter.    Prior Intubation HX:  NA    Modified Barium Swallow: NA    Chest X-Rays: None on file  MRI: There are small foci of restricted diffusion in the left frontal and parietal lobes, compatible with acute infarcts. There are 3 punctate foci in total.   Prior diet: Regular.    Occupation/hobbies/homemaking: Pt/family report PLOF as independent with ADLS, independent with IADLS. Pt does drive. Level of education is 12th grade. Stays active and watches grandchildren every day. She reports increases stress at home; feels overwhelmed. .    Subjective     The pt was sitting  up in bed, alert, pleasant and cooperative for ST session.    Patient goals: To figure out and prevent further TIA's.     Pain/Comfort:       Respiratory Status: Room air    Objective:   Objective to complete OM exam and Re-admin of MOCA. The patient asked to do the MOCA tomorrow as she was feeling tired today and wanted to do her best.  Oral Musculature Evaluation  Oral Musculature: WFL  Dentition: present and adequate  Secretion Management: adequate  Mucosal Quality: good  Mandibular Strength and Mobility: WFL  Oral Labial Strength and Mobility: WFL  Lingual Strength and Mobility: WFL  Velar Elevation: WFL  Buccal Strength and Mobility: WFL    Bedside Swallow Eval:   Consistencies Assessed:  Thin liquids 3 oz water test and patient/family report      Oral Phase:   WFL    Pharyngeal Phase:   no overt clinical signs/symptoms of aspiration  no overt clinical signs/symptoms of pharyngeal dysphagia    Compensatory Strategies  None    Treatment: Educated patient re: location of Stroke and what those areas generally control. The patient explained that she had been having difficulty with higher level attention, multi tasking and feeling overwhelmed, which she described as all being new deficits for her. Pt/family also educated re: results/recs of evaluation, SLP role and POC. Receptive to information provided.        Goals:   Multidisciplinary Problems       SLP Goals       Not on file                    Plan:     Patient to be seen:  1 x/week   Plan of Care expires:     Plan of Care reviewed with:  patient, spouse, daughter   SLP Follow-Up:  Yes       Discharge recommendations:   (TBD)   Barriers to Discharge:  None    Time Tracking:     SLP Treatment Date:   04/09/24  Speech Start Time:  1337  Speech Stop Time:  1400     Speech Total Time (min):  23 min    Billable Minutes: Eval Swallow and Oral Function 8, Self Care/Home Management Training 15, and Total Time 23    04/09/2024

## 2024-04-09 NOTE — ASSESSMENT & PLAN NOTE
Patient take Tresiba 14 units daily and metformin.    Will order Lantus 14 units in the morning.  Also Humalog sliding scale.

## 2024-04-09 NOTE — SUBJECTIVE & OBJECTIVE
No past medical history on file.    Past Surgical History:   Procedure Laterality Date     SECTION       SECTION       SECTION       SECTION      WISDOM TOOTH EXTRACTION      at age 18; x 4       Review of patient's allergies indicates:   Allergen Reactions    Codeine     Penicillins        No current facility-administered medications on file prior to encounter.     Current Outpatient Medications on File Prior to Encounter   Medication Sig    amLODIPine (NORVASC) 5 MG tablet Take 1 tablet (5 mg total) by mouth once daily.    aspirin (ECOTRIN) 81 MG EC tablet Take 1 tablet (81 mg total) by mouth once daily.    clopidogreL (PLAVIX) 75 mg tablet Take 1 tablet (75 mg total) by mouth once daily. for 21 days    metFORMIN (FORTAMET) 500 mg 24hr tablet Take 1 tablet (500 mg total) by mouth 2 (two) times a day. Take 1 tablet orally twice daily    RED YEAST RICE ORAL Take 1 capsule by mouth 2 (two) times a day.    TRESIBA FLEXTOUCH U-100 100 unit/mL (3 mL) insulin pen Inject 14 Units into the skin once daily.    atorvastatin (LIPITOR) 40 MG tablet Take 2 tablets (80 mg total) by mouth once daily.    conjugated estrogens (PREMARIN) vaginal cream Place 1 g vaginally daily as needed.    insulin aspart U-100 (NOVOLOG) 100 unit/mL (3 mL) InPn pen Inject 2 Units into the skin 2 (two) times daily with meals.    progesterone micronized (CRINONE) 4 % Gel Place 1 application  vaginally daily as needed.    sertraline (ZOLOFT) 100 MG tablet Take 1 tablet (100 mg total) by mouth once daily. (Patient not taking: Reported on 4/3/2024)     Family History       Problem Relation (Age of Onset)    Hypertension Mother          Tobacco Use    Smoking status: Never    Smokeless tobacco: Never   Substance and Sexual Activity    Alcohol use: Not Currently    Drug use: Yes     Comment: CBD oil     Sexual activity: Not on file     Review of Systems   Constitutional:  Negative for chills and fever.    HENT:  Negative for sore throat.    Eyes:  Negative for visual disturbance.   Respiratory:  Negative for cough and shortness of breath.    Cardiovascular:  Negative for chest pain and palpitations.   Gastrointestinal:  Negative for abdominal pain, nausea and vomiting.   Endocrine: Negative for cold intolerance and heat intolerance.   Genitourinary:  Negative for dysuria, frequency and urgency.   Musculoskeletal:  Negative for arthralgias and myalgias.   Skin:  Negative for rash.   Neurological:  Positive for speech difficulty and numbness. Negative for dizziness, syncope and light-headedness.        And right facial droop.   Psychiatric/Behavioral:  Negative for confusion.      Objective:     Vital Signs (Most Recent):  Temp: 98 °F (36.7 °C) (04/08/24 1651)  Pulse: 75 (04/08/24 2031)  Resp: 19 (04/08/24 1956)  BP: (!) 175/74 (04/08/24 2031)  SpO2: 100 % (04/08/24 2031) Vital Signs (24h Range):  Temp:  [98 °F (36.7 °C)] 98 °F (36.7 °C)  Pulse:  [75-87] 75  Resp:  [18-22] 19  SpO2:  [99 %-100 %] 100 %  BP: (146-213)/() 175/74     Weight: 58.1 kg (128 lb)  Body mass index is 22.32 kg/m².     Physical Exam  Vitals reviewed.   Constitutional:       Appearance: Normal appearance.   HENT:      Head: Normocephalic and atraumatic.      Mouth/Throat:      Mouth: Mucous membranes are moist.   Eyes:      Extraocular Movements: Extraocular movements intact.      Conjunctiva/sclera: Conjunctivae normal.      Pupils: Pupils are equal, round, and reactive to light.   Cardiovascular:      Rate and Rhythm: Normal rate and regular rhythm.   Pulmonary:      Effort: Pulmonary effort is normal.      Breath sounds: Normal breath sounds.   Abdominal:      General: Abdomen is flat. Bowel sounds are normal. There is no distension.      Palpations: Abdomen is soft.      Tenderness: There is no abdominal tenderness.   Musculoskeletal:         General: No swelling or tenderness. Normal range of motion.      Cervical back: Normal range  "of motion and neck supple.   Skin:     General: Skin is warm.   Neurological:      General: No focal deficit present.      Mental Status: She is alert and oriented to person, place, and time.      Cranial Nerves: No cranial nerve deficit.      Sensory: No sensory deficit.      Motor: No weakness.   Psychiatric:         Mood and Affect: Mood normal.         Behavior: Behavior normal.              CRANIAL NERVES     CN III, IV, VI   Pupils are equal, round, and reactive to light.       Significant Labs: All pertinent labs within the past 24 hours have been reviewed.  CBC:   Recent Labs   Lab 04/08/24  1703   WBC 7.24   HGB 10.4*   HCT 30.3*        CMP:   Recent Labs   Lab 04/08/24  1703      K 4.0      CO2 20*   *   BUN 19   CREATININE 0.9   CALCIUM 9.2   PROT 7.5   ALBUMIN 4.8   BILITOT 1.8*   ALKPHOS 188*   *   *   ANIONGAP 11     Cardiac Markers: No results for input(s): "CKMB", "MYOGLOBIN", "BNP", "TROPISTAT" in the last 48 hours.  Lactic Acid: No results for input(s): "LACTATE" in the last 48 hours.  Magnesium: No results for input(s): "MG" in the last 48 hours.  Troponin: No results for input(s): "TROPONINI", "TROPONINIHS" in the last 48 hours.  TSH:   Recent Labs   Lab 04/03/24  2030   TSH 5.132     Urine Culture: No results for input(s): "LABURIN" in the last 48 hours.  Urine Studies: No results for input(s): "COLORU", "APPEARANCEUA", "PHUR", "SPECGRAV", "PROTEINUA", "GLUCUA", "KETONESU", "BILIRUBINUA", "OCCULTUA", "NITRITE", "UROBILINOGEN", "LEUKOCYTESUR", "RBCUA", "WBCUA", "BACTERIA", "SQUAMEPITHEL", "HYALINECASTS" in the last 48 hours.    Invalid input(s): "WRIGHTSUR"    Significant Imaging: I have reviewed all pertinent imaging results/findings within the past 24 hours.  "

## 2024-04-09 NOTE — ASSESSMENT & PLAN NOTE
Blood pressure of 213/94 on arrival to the ER.  She is currently on small dose of Norvasc at home.  Per sign-out from the ER physician, Neurology recommended permissive hypertension so will hold off resuming norvasc.

## 2024-04-09 NOTE — ASSESSMENT & PLAN NOTE
Antithrombotics for secondary stroke prevention: Antiplatelets: Aspirin: 81 mg daily  Clopidogrel: 75 mg daily    Statins for secondary stroke prevention and hyperlipidemia, if present:   Statins: Atorvastatin- 80 mg daily    Aggressive risk factor modification: HTN, DM, HLD     Rehab efforts: The patient has been evaluated by a stroke team provider and the therapy needs have been fully considered based off the presenting complaints and exam findings. The following therapy evaluations are needed: PT evaluate and treat, OT evaluate and treat, SLP evaluate and treat    Diagnostics ordered/pending: HgbA1C to assess blood glucose levels, Lipid Profile to assess cholesterol levels, TTE to assess cardiac function/status     VTE prophylaxis: Enoxaparin 40 mg SQ every 24 hours    BP parameters: Infarct: No intervention, SBP <220      Patient most likely has multiple punctuate CVA secondary to uncontrolled hypertension diabetes,  patient also has mild carotid artery disease.   Continue aspirin Plavix and Lipitor.  Get PT OT evaluation, speech therapy evaluation  We will get 2D echo as well  Continue cardiac monitor  Neurology evaluation

## 2024-04-09 NOTE — PT/OT/SLP EVAL
Occupational Therapy   Evaluation and Discharge Note    Name: Nell Smith  MRN: [id number]  Admitting Diagnosis: Acute CVA (cerebrovascular accident)  Recent Surgery: * No surgery found *      Recommendations:     Discharge Recommendations: No Therapy Indicated  Discharge Equipment Recommendations: none  Barriers to discharge:  None    Assessment:     Nell Smith is a 63 y.o. female with a medical diagnosis of Acute CVA (cerebrovascular accident). At this time, patient is functioning at their prior level of function and does not require further acute OT services.     Plan:     During this hospitalization, patient does not require further acute OT services.  Please re-consult if situation changes.    Plan of Care Reviewed with: patient, spouse    Subjective     Chief Complaint: none  Patient/Family Comments/goals: I have no symptoms of numbness or facial weakness anymore    Occupational Profile:  Living Environment: pt lives with spouse, 24 y/o daughter and grand children ; walk n shower and t/s combo.   Previous level of function: Indep ADLS, IADLS, watches her grand kinds, drives, berman yard work, grocery shopping  Roles and Routines: very active, mother, wife, grandmother, bakes, watches her grand kids  Equipment Used at home: none  Assistance upon Discharge: family    Pain/Comfort:  Pain Rating 1: 0/10  Pain Rating Post-Intervention 1: 0/10    Patients cultural, spiritual, Syn[employer] conflicts given the current situation: no    Objective:     Communicated with: nurse prior to session.  Patient found HOB elevated with telemetry upon OT entry to room.    General Precautions: Standard, diabetic  Orthopedic Precautions: N/A  Braces: N/A  Respiratory Status: Room air     Occupational Performance:    Bed Mobility:    Patient completed Rolling/Turning to Left with  independence  Patient completed Supine to Sit with independence  Patient completed Sit to Supine with independence    Functional  Mobility/Transfers:  Patient completed Sit <> Stand Transfer with independence  with  no assistive device   Patient completed Toilet Transfer Step Transfer technique with independence with  no AD  Functional Mobility: ambulated Indep in room and made quick stops on commands Indep.     Activities of Daily Living:  Feeding:  independence .  Grooming: independence standing   Bathing: independence simulated  Upper Body Dressing: independence .  Lower Body Dressing: independence .  Toileting: independence .    Cognitive/Visual Perceptual:  Cognitive/Psychosocial Skills:     -       Oriented to: Person, Place, Time, and Situation   -       Follows Commands/attention:Follows multistep  commands  -       Communication: clear/fluent  -       Memory: No Deficits noted  -       Safety awareness/insight to disability: intact   -       Mood/Affect/Coping skills/emotional control: Appropriate to situation  Visual/Perceptual:      -Intact glasses    Physical Exam:  Balance:    -       sitting and standing: excellent  Postural examination/scapula alignment:    -       No postural abnormalities identified  Sensation:    -       Intact  Motor Planning:    -       intact  Dominant hand:    -       right  Upper Extremity Range of Motion:     -       Right Upper Extremity: WFL  -       Left Upper Extremity: WFL  Upper Extremity Strength:    -       Right Upper Extremity: WFL  -       Left Upper Extremity: WFL   Strength:    -       Right Upper Extremity: WFL  -       Left Upper Extremity: WFL  Fine Motor Coordination:    -       Intact  Gross motor coordination:   WFL  Neurological:    -       normal one    AMPAC 6 Click ADL:  AMPAC Total Score: 24    Treatment & Education:  Purpose of OT and POC  Pt and spouse educated in [facility] signs and symptoms of stroke.   All questions and concerns addressed within scope.  DC OT no needs identified. Pt and  spouse in agreement.     Patient left HOB elevated with all lines intact, call button  in reach, nurse notified, and spouse present    GOALS:   Multidisciplinary Problems       Occupational Therapy Goals       Not on file                    History:     Past Medical History:   Diagnosis Date    Anxiety disorder, unspecified     Diabetes mellitus     Hypertension          Past Surgical History:   Procedure Laterality Date     SECTION       SECTION       SECTION       SECTION      WISDOM TOOTH EXTRACTION      at age 18; x 4       Time Tracking:     OT Date of Treatment: 24  OT Start Time: 1522  OT Stop Time: 1537  OT Total Time (min): 15 min    Billable Minutes:Evaluation 15  Total Time 15    2024

## 2024-04-09 NOTE — NURSING
Nurses Note -- 4 Eyes      4/9/2024   1:45 AM      Skin assessed during: Transfer      [x] No Altered Skin Integrity Present    []Prevention Measures Documented      [] Yes- Altered Skin Integrity Present or Discovered   [] LDA Added if Not in Epic (Describe Wound)   [] New Altered Skin Integrity was Present on Admit and Documented in LDA   [] Wound Image Taken    Wound Care Consulted? No    Attending Nurse:  Ngozi Marks RN/Staff Member:   ELLIE Holman

## 2024-04-09 NOTE — ASSESSMENT & PLAN NOTE
New compared to few days ago.  Patient does not have abdominal pain, nausea or vomiting.  I will order ultrasound right upper quadrant.  Trend LFTs.

## 2024-04-09 NOTE — SUBJECTIVE & OBJECTIVE
Interval History:     63-year-old  female with history of  IDDM, hypertension, hyperlipidemia presenting here for sudden onset of slurred speech and right upper arm numbness and tingling.  MRI showed Several punctate acute infarcts in the left cerebral hemisphere. patient had hypertensive emergency upon admission.  Patient recently has similar presentation but signed out AMA.     Seen in the multidisciplinary rounds, patient right upper arm numbness tingling improved, slurred speech resolved, no fever or chills, no nausea vomiting diarrhea, abdominal pain.  Patient is known to have gallstones.     Review of Systems   Constitutional:  Negative for activity change and fever.   HENT:  Negative for ear discharge, facial swelling and sore throat.    Eyes:  Negative for photophobia, pain and visual disturbance.   Respiratory:  Negative for apnea, cough and shortness of breath.    Cardiovascular:  Negative for chest pain and leg swelling.   Gastrointestinal:  Negative for abdominal pain and blood in stool.   Endocrine: Negative for cold intolerance and heat intolerance.   Musculoskeletal:  Negative for back pain and gait problem.   Skin:  Negative for pallor and rash.   Neurological:  Positive for speech difficulty, weakness and numbness. Negative for headaches.   Psychiatric/Behavioral:  Negative for confusion, hallucinations and suicidal ideas.    All other systems reviewed and are negative.    Objective:     Vital Signs (Most Recent):  Temp: 98.4 °F (36.9 °C) (04/09/24 0600)  Pulse: 68 (04/09/24 0943)  Resp: 17 (04/09/24 0943)  BP: (!) 151/67 (04/09/24 0600)  SpO2: 100 % (04/09/24 0943) Vital Signs (24h Range):  Temp:  [98 °F (36.7 °C)-98.4 °F (36.9 °C)] 98.4 °F (36.9 °C)  Pulse:  [66-87] 68  Resp:  [14-60] 17  SpO2:  [97 %-100 %] 100 %  BP: (132-213)/() 151/67     Weight: 62.5 kg (137 lb 12.8 oz)  Body mass index is 24.41 kg/m².    Intake/Output Summary (Last 24 hours) at 4/9/2024 1038  Last data filed  at 4/9/2024 0501  Gross per 24 hour   Intake 120 ml   Output 200 ml   Net -80 ml         Physical Exam  Constitutional:       Appearance: Normal appearance.   HENT:      Head: Normocephalic and atraumatic.      Nose: Nose normal.   Eyes:      Extraocular Movements: Extraocular movements intact.      Pupils: Pupils are equal, round, and reactive to light.   Cardiovascular:      Rate and Rhythm: Normal rate and regular rhythm.      Heart sounds: No murmur heard.  Pulmonary:      Effort: Pulmonary effort is normal.      Breath sounds: Normal breath sounds.   Abdominal:      General: Abdomen is flat.      Palpations: Abdomen is soft.   Musculoskeletal:         General: Normal range of motion.      Cervical back: Normal range of motion and neck supple.   Skin:     General: Skin is warm and dry.   Neurological:      General: No focal deficit present.      Mental Status: She is alert and oriented to person, place, and time.   Psychiatric:         Mood and Affect: Mood normal.             Significant Labs: All pertinent labs within the past 24 hours have been reviewed.  CBC:   Recent Labs   Lab 04/08/24  1703 04/09/24  0426   WBC 7.24 6.38   HGB 10.4* 8.8*   HCT 30.3* 26.6*    193     CMP:   Recent Labs   Lab 04/08/24  1703 04/09/24  0426    139   K 4.0 3.9    109   CO2 20* 25   * 109   BUN 19 16   CREATININE 0.9 0.7   CALCIUM 9.2 9.0   PROT 7.5 6.5   ALBUMIN 4.8 4.1   BILITOT 1.8* 1.6*   ALKPHOS 188* 143*   * 84*   * 249*   ANIONGAP 11 5*       Significant Imaging: I have reviewed all pertinent imaging results/findings within the past 24 hours.  I have reviewed and interpreted all pertinent imaging results/findings within the past 24 hours.    US Abdomen Limited    Result Date: 4/9/2024  EXAMINATION: US ABDOMEN LIMITED CLINICAL HISTORY: transaminitis; TECHNIQUE: Limited ultrasound of the right upper quadrant of the abdomen (including pancreas, liver, gallbladder, common bile duct,  and spleen) was performed. COMPARISON: None. FINDINGS: Liver: Normal in size, measuring 13.5 cm.  No focal abnormality of the visualized hepatic parenchyma. Gallbladder: Gallbladder is distended.  There is a large shadowing calculus identified in the gallbladder lumen.  Additionally, there is a significant volume of echogenic material throughout the gallbladder lumen with possible increased vascularity.  This may potentially represent large volume of biliary sludge, although gallbladder neoplasm not excluded.  The technologist reports a negative sonographic Jamison sign, noting assessment is limited by prior administration of analgesic medication. Biliary system: The common duct is not dilated, measuring 6 mm.  No intrahepatic ductal dilatation. Right kidney: No evidence of hydronephrosis.. Pancreas: Partially obscured by bowel gas.  Visualized portion appears grossly within normal limits. Miscellaneous: No upper abdominal ascites.     Large calculus within the gallbladder lumen as well as extensive echogenic material throughout the gallbladder lumen with possible increased vascularity.  Evaluation of sonographic Jamison sign is reported negative, although limited by prior administration of analgesic medication.  Consider further evaluation with contrast-enhanced CT or MRI/MRCP to exclude underlying acute cholecystitis and/or gallbladder neoplasm. This report was flagged in Epic as abnormal. Electronically signed by: Vignesh Pugh MD Date:    04/09/2024 Time:    01:58    MRI Brain Without Contrast    Result Date: 4/8/2024  EXAMINATION: MRI BRAIN WITHOUT CONTRAST CLINICAL HISTORY: Transient ischemic attack (TIA); TECHNIQUE: Multiplanar multisequence MR imaging of the brain was performed without intravenous contrast. COMPARISON: CTA head and neck from earlier today taken at 17:19. FINDINGS: Mild motion artifact.  There are small foci of restricted diffusion in the left frontal and parietal lobes, compatible with acute  infarcts.  There are 3 punctate foci in total.  There are mild chronic microvascular ischemic changes.  Ventricles are normal in size for age without evidence of hydrocephalus.  No intracranial mass or hemorrhage.  No extra-axial blood or fluid collections. Normal vascular flow voids. Bone marrow signal intensity is normal. Paranasal sinuses and mastoid air cells are clear.     Several punctate acute infarcts in the left cerebral hemisphere as above. This report was flagged in Epic as abnormal. Dr. Bryant discussed critical findings with Dr. Alonzo by telephone at 23:54 on 04/08/2024. Electronically signed by: Elio Bryant Date:    04/08/2024 Time:    23:56    CTA Head and Neck (xpd)    Result Date: 4/8/2024  EXAMINATION: CTA HEAD AND NECK (XPD) CLINICAL HISTORY: Neuro deficit, acute, stroke suspected; TECHNIQUE: Non contrast low dose axial images were obtained through the head.  CT angiogram was performed from the level of the neda to the top of the head following the IV administration of 100mL of Omnipaque 350.   Sagittal and coronal reconstructions and maximum intensity projection reconstructions were performed. Arterial stenosis percentages are based on NASCET measurement criteria. COMPARISON: None FINDINGS: No abnormal intracranial enhancement.  No hydrocephalus.  Globes normal contents are unremarkable.  Mild mucosal thickening of both ethmoid air cells and left maxillary sinus.  Mastoid air cells are clear.  Left maxillary periodontal disease.  No mass or abnormal enhancement of the aero digestive tract.  Subtle hypodense lesion right thyroid gland measures 1.2 cm series 2, image 72.  Prominent adenoid tonsillar tissue.  Remaining glandular tissue in the neck unremarkable.  Straightening of normal cervical lordosis with advanced degenerative disc disease at C6-7 and additional degenerative change at several other spinal levels. Normal 3 vessel arch.  Right common carotid artery and carotid bulb are patent.   Right ICA exhibits plaque along the distal cavernous segment with up to 50% stenosis.  Up to 50% luminal narrowing in the supraclinoid ICA over a subcentimeter segment as seen axial series 2 images 222-224.  Left common carotid artery and carotid bulb are patent.  There is plaque along the cavernous left ICA with less than 50% stenosis.  There is narrowing at the junction of the cavernous and supraclinoid ICA segments on the left with less than 50% stenosis.  Bilateral MCAs and ACAs are patent.  There is an anterior communicating artery. Right vertebral artery origin and course are patent.  Patient is left vertebral artery dominant.  Left vertebral artery origin and course are patent.  Basilar artery and PCAs are patent.  There is a small right posterior communicating artery.  No left posterior communicating artery is visualized.     1. Up to 50% luminal narrowing in the cavernous right ICA due to plaque.  Up to 50% luminal narrowing at the junction of the right cavernous and supraclinoid ICA segments, possibly on a congenital basis as there is no calcified plaque seen in the region. 2. No aneurysm or hemodynamically significant stenosis elsewhere along the head and neck arterial vasculature. 3. Nodule versus cyst along the right thyroid lobe.  This could be further characterized with ultrasound in an elective setting as warranted. Electronically signed by: Vignesh Do Date:    04/08/2024 Time:    18:45    CT HEAD FOR STROKE    Result Date: 4/8/2024  EXAMINATION: CT HEAD FOR STROKE CLINICAL HISTORY: Neuro deficit, acute, stroke suspected; TECHNIQUE: Low dose axial images were obtained through the head.  Coronal and sagittal reformations were also performed. Contrast was not administered. COMPARISON: 04/03/2024 FINDINGS: Normal size of the ventricular system. No hemorrhage or major vascular distribution infarct. No intra or extra-axial mass. No mass effect or midline shift. Included orbits are unremarkable.  Paranasal sinuses and mastoid air cells are clear. No acute osseous abnormality.  Atherosclerosis of the carotid siphons.     No intracranial hemorrhage.  No evidence for recent ischemia, noting however that MRI could add sensitivity in an acute setting. Electronically signed by: Vignesh Do Date:    04/08/2024 Time:    17:39    US Carotid Bilateral    Result Date: 4/3/2024  EXAM DESCRIPTION: US CAROTID DOPPLER BILATERAL CLINICAL HISTORY: Velocities evaluation. TECHNIQUE: Real time grey scale and Doppler ultrasound were utilized to evaluate the bilateral carotid arteries. COMPARISON: None. FINDINGS: All velocities in cm/sec. RIGHT CAROTID: Morphology: No significant plaque. Peak Systolic Velocities (PSV) (Normal <125): Proximal CCA: 57 Proximal ICA: 30 Mid ICA: 41 Distal ICA: 42 ECA: 65 Right vertebral: Antegrade flow, 41 ICA/CCA PSV ratio: 0.7 (Normal <2.0) End Diastolic Velocities (EDV) (Normal <40) Proximal ICA: 9 Mid ICA: 15 Distal ICA: 22 LEFT CAROTID: Morphology: No significant plaque. Peak Systolic Velocities (PSV) (Normal <125): Proximal CCA: 69 Proximal ICA: 20 Mid ICA: 30 Distal ICA: 25 ECA: 66 Left vertebral: Antegrade flow, 82 ICA/CCA PSV ratio: 0.4 (Normal <2.0) End Diastolic Velocities (EDV) (Normal <40) Proximal ICA: 4 Mid ICA: 5 Distal ICA: 8 Some of the tracings demonstrate a somewhat irregular heart rhythm. IMPRESSION: 1.  No stenosis of the bilateral carotid arteries. 2.  Irregular heart rhythm. Electronically signed by:  Rani Kenny MD  04/03/2024 10:54 PM CDT Workstation: [id number]    CT Head Without Contrast    Result Date: 4/3/2024   ADDENDUM #1 STROKE CODE RESULT: Pertinent findings were communicated by phone to Dr. Vignesh Jarquin on 4/3/2024 at 8:27 PM CDT. Electronically signed by:  Rani Kenny MD  04/03/2024 08:30 PM CDT Workstation: [id number]  ORIGINAL REPORT EXAM DESCRIPTION: CT HEAD WITHOUT IV CONTRAST CLINICAL HISTORY: Neuro deficit, acute, stroke suspected.  Numbness in right hand. Also history yesterday afternoon, only lasted a few minutes. Over 2 days has been mixing upwards and just feels off. TECHNIQUE: Multiple axial images were obtained through the brain without intravenous contrast. Coronal and sagittal images were reconstructed. All CT scans at this facility use dose modulation, iterative reconstruction, and/or weight based dosing when appropriate to reduce radiation dose to as low as reasonably achievable. COMPARISON: None. FINDINGS: Of note, the most inferior portion of the posterior fossa is not included on the scan, with the inferior margin of the cerebellum and brain stem not included. There is no evidence of acute intracranial hemorrhage or acute major territorial infarction. The ventricles, sulci and cisterns are normal in size. There is no midline shift. No abnormal extra-axial fluid collections are seen. The brain parenchyma is grossly unremarkable. Vascular calcifications. The visualized portions of the paranasal sinuses are clear bilaterally. The mastoid air cells are clear bilaterally. The calvarium appears grossly unremarkable. IMPRESSION: 1.  No evidence of acute intracranial abnormality. 2.  Of note, the most inferior portion of the posterior fossa is not included on the scan, with the inferior margin of the cerebellum and brain stem not included. If there is any clinical concern for pathology within the floor of the posterior fossa, repeat CT could be considered. Electronically signed by:  Rani Kenny MD  04/03/2024 08:17 PM CDT Workstation: [id number]  - pulls last radiology orders

## 2024-04-10 ENCOUNTER — ANESTHESIA (OUTPATIENT)
Dept: CARDIOLOGY | Facility: HOSPITAL | Age: 64
DRG: 065 | End: 2024-04-10
Payer: MEDICAID

## 2024-04-10 ENCOUNTER — CLINICAL SUPPORT (OUTPATIENT)
Dept: CARDIOLOGY | Facility: HOSPITAL | Age: 64
DRG: 065 | End: 2024-04-10
Attending: INTERNAL MEDICINE
Payer: MEDICAID

## 2024-04-10 ENCOUNTER — ANESTHESIA EVENT (OUTPATIENT)
Dept: CARDIOLOGY | Facility: HOSPITAL | Age: 64
DRG: 065 | End: 2024-04-10
Payer: MEDICAID

## 2024-04-10 VITALS
SYSTOLIC BLOOD PRESSURE: 99 MMHG | HEIGHT: 63 IN | HEART RATE: 75 BPM | BODY MASS INDEX: 24.42 KG/M2 | RESPIRATION RATE: 21 BRPM | WEIGHT: 137.81 LBS | DIASTOLIC BLOOD PRESSURE: 52 MMHG | OXYGEN SATURATION: 100 %

## 2024-04-10 VITALS
RESPIRATION RATE: 21 BRPM | HEART RATE: 83 BPM | BODY MASS INDEX: 24.42 KG/M2 | TEMPERATURE: 99 F | OXYGEN SATURATION: 100 % | WEIGHT: 137.81 LBS | DIASTOLIC BLOOD PRESSURE: 70 MMHG | SYSTOLIC BLOOD PRESSURE: 182 MMHG | HEIGHT: 63 IN

## 2024-04-10 DIAGNOSIS — E11.9 TYPE 2 DIABETES MELLITUS WITHOUT COMPLICATION: ICD-10-CM

## 2024-04-10 DIAGNOSIS — E11.9 TYPE 2 DIABETES MELLITUS WITHOUT COMPLICATION, UNSPECIFIED WHETHER LONG TERM INSULIN USE: ICD-10-CM

## 2024-04-10 LAB
ALBUMIN SERPL BCP-MCNC: 4.1 G/DL (ref 3.5–5.2)
ALP SERPL-CCNC: 122 U/L (ref 55–135)
ALT SERPL W/O P-5'-P-CCNC: 159 U/L (ref 10–44)
ANION GAP SERPL CALC-SCNC: 5 MMOL/L (ref 8–16)
AST SERPL-CCNC: 30 U/L (ref 10–40)
BASOPHILS # BLD AUTO: 0.02 K/UL (ref 0–0.2)
BASOPHILS NFR BLD: 0.3 % (ref 0–1.9)
BILIRUB SERPL-MCNC: 1.1 MG/DL (ref 0.1–1)
BSA FOR ECHO PROCEDURE: 1.67 M2
BUN SERPL-MCNC: 20 MG/DL (ref 8–23)
CALCIUM SERPL-MCNC: 9 MG/DL (ref 8.7–10.5)
CHLORIDE SERPL-SCNC: 108 MMOL/L (ref 95–110)
CHOLEST SERPL-MCNC: 134 MG/DL (ref 120–199)
CHOLEST/HDLC SERPL: 4.2 {RATIO} (ref 2–5)
CO2 SERPL-SCNC: 25 MMOL/L (ref 23–29)
CREAT SERPL-MCNC: 0.8 MG/DL (ref 0.5–1.4)
DIFFERENTIAL METHOD BLD: ABNORMAL
EOSINOPHIL # BLD AUTO: 0.1 K/UL (ref 0–0.5)
EOSINOPHIL NFR BLD: 1.7 % (ref 0–8)
ERYTHROCYTE [DISTWIDTH] IN BLOOD BY AUTOMATED COUNT: 18.3 % (ref 11.5–14.5)
EST. GFR  (NO RACE VARIABLE): >60 ML/MIN/1.73 M^2
GLUCOSE SERPL-MCNC: 183 MG/DL (ref 70–110)
HCT VFR BLD AUTO: 26.5 % (ref 37–48.5)
HDLC SERPL-MCNC: 32 MG/DL (ref 40–75)
HDLC SERPL: 23.9 % (ref 20–50)
HGB BLD-MCNC: 8.9 G/DL (ref 12–16)
IMM GRANULOCYTES # BLD AUTO: 0.03 K/UL (ref 0–0.04)
IMM GRANULOCYTES NFR BLD AUTO: 0.5 % (ref 0–0.5)
LDLC SERPL CALC-MCNC: 54.6 MG/DL (ref 63–159)
LYMPHOCYTES # BLD AUTO: 0.8 K/UL (ref 1–4.8)
LYMPHOCYTES NFR BLD: 13.4 % (ref 18–48)
MAGNESIUM SERPL-MCNC: 2.4 MG/DL (ref 1.6–2.6)
MCH RBC QN AUTO: 29.4 PG (ref 27–31)
MCHC RBC AUTO-ENTMCNC: 33.6 G/DL (ref 32–36)
MCV RBC AUTO: 88 FL (ref 82–98)
MONOCYTES # BLD AUTO: 0.4 K/UL (ref 0.3–1)
MONOCYTES NFR BLD: 5.8 % (ref 4–15)
NEUTROPHILS # BLD AUTO: 4.7 K/UL (ref 1.8–7.7)
NEUTROPHILS NFR BLD: 78.3 % (ref 38–73)
NONHDLC SERPL-MCNC: 102 MG/DL
NRBC BLD-RTO: 0 /100 WBC
PLATELET # BLD AUTO: 215 K/UL (ref 150–450)
PMV BLD AUTO: 11.3 FL (ref 9.2–12.9)
POTASSIUM SERPL-SCNC: 4 MMOL/L (ref 3.5–5.1)
PROT SERPL-MCNC: 6.5 G/DL (ref 6–8.4)
RBC # BLD AUTO: 3.03 M/UL (ref 4–5.4)
SODIUM SERPL-SCNC: 138 MMOL/L (ref 136–145)
TRIGL SERPL-MCNC: 237 MG/DL (ref 30–150)
WBC # BLD AUTO: 6.05 K/UL (ref 3.9–12.7)

## 2024-04-10 PROCEDURE — 93325 DOPPLER ECHO COLOR FLOW MAPG: CPT | Mod: 26,,, | Performed by: INTERNAL MEDICINE

## 2024-04-10 PROCEDURE — 80053 COMPREHEN METABOLIC PANEL: CPT | Performed by: HOSPITALIST

## 2024-04-10 PROCEDURE — D9220A PRA ANESTHESIA: Mod: ANES,,, | Performed by: ANESTHESIOLOGY

## 2024-04-10 PROCEDURE — B24BZZ4 ULTRASONOGRAPHY OF HEART WITH AORTA, TRANSESOPHAGEAL: ICD-10-PCS | Performed by: INTERNAL MEDICINE

## 2024-04-10 PROCEDURE — 94761 N-INVAS EAR/PLS OXIMETRY MLT: CPT

## 2024-04-10 PROCEDURE — 93320 DOPPLER ECHO COMPLETE: CPT

## 2024-04-10 PROCEDURE — 36415 COLL VENOUS BLD VENIPUNCTURE: CPT | Performed by: HOSPITALIST

## 2024-04-10 PROCEDURE — 25000003 PHARM REV CODE 250: Performed by: HOSPITALIST

## 2024-04-10 PROCEDURE — 83036 HEMOGLOBIN GLYCOSYLATED A1C: CPT | Performed by: HOSPITALIST

## 2024-04-10 PROCEDURE — 93312 ECHO TRANSESOPHAGEAL: CPT | Mod: 26,,, | Performed by: INTERNAL MEDICINE

## 2024-04-10 PROCEDURE — 85025 COMPLETE CBC W/AUTO DIFF WBC: CPT | Performed by: HOSPITALIST

## 2024-04-10 PROCEDURE — 83735 ASSAY OF MAGNESIUM: CPT | Performed by: HOSPITALIST

## 2024-04-10 PROCEDURE — 37000008 HC ANESTHESIA 1ST 15 MINUTES: Performed by: ANESTHESIOLOGY

## 2024-04-10 PROCEDURE — 99900031 HC PATIENT EDUCATION (STAT)

## 2024-04-10 PROCEDURE — 80061 LIPID PANEL: CPT | Performed by: HOSPITALIST

## 2024-04-10 PROCEDURE — 37000009 HC ANESTHESIA EA ADD 15 MINS: Performed by: ANESTHESIOLOGY

## 2024-04-10 PROCEDURE — D9220A PRA ANESTHESIA: Mod: CRNA,,, | Performed by: NURSE ANESTHETIST, CERTIFIED REGISTERED

## 2024-04-10 PROCEDURE — 63600175 PHARM REV CODE 636 W HCPCS: Performed by: NURSE ANESTHETIST, CERTIFIED REGISTERED

## 2024-04-10 PROCEDURE — 93320 DOPPLER ECHO COMPLETE: CPT | Mod: 26,,, | Performed by: INTERNAL MEDICINE

## 2024-04-10 RX ORDER — LOSARTAN POTASSIUM 50 MG/1
50 TABLET ORAL DAILY
Qty: 90 TABLET | Refills: 0 | Status: SHIPPED | OUTPATIENT
Start: 2024-04-10 | End: 2024-07-09

## 2024-04-10 RX ORDER — PROPOFOL 10 MG/ML
VIAL (ML) INTRAVENOUS
Status: DISCONTINUED | OUTPATIENT
Start: 2024-04-10 | End: 2024-04-10

## 2024-04-10 RX ORDER — AMLODIPINE BESYLATE 10 MG/1
10 TABLET ORAL DAILY
Qty: 90 TABLET | Refills: 0 | Status: SHIPPED | OUTPATIENT
Start: 2024-04-10 | End: 2024-07-09

## 2024-04-10 RX ORDER — SERTRALINE HYDROCHLORIDE 50 MG/1
100 TABLET, FILM COATED ORAL DAILY
Status: DISCONTINUED | OUTPATIENT
Start: 2024-04-10 | End: 2024-04-10 | Stop reason: HOSPADM

## 2024-04-10 RX ORDER — MUPIROCIN 20 MG/G
OINTMENT TOPICAL 2 TIMES DAILY
Status: DISCONTINUED | OUTPATIENT
Start: 2024-04-10 | End: 2024-04-10 | Stop reason: HOSPADM

## 2024-04-10 RX ORDER — AMLODIPINE BESYLATE 5 MG/1
10 TABLET ORAL DAILY
Status: DISCONTINUED | OUTPATIENT
Start: 2024-04-10 | End: 2024-04-10 | Stop reason: HOSPADM

## 2024-04-10 RX ADMIN — PROPOFOL 50 MG: 10 INJECTION, EMULSION INTRAVENOUS at 11:04

## 2024-04-10 RX ADMIN — PROPOFOL 100 MG: 10 INJECTION, EMULSION INTRAVENOUS at 11:04

## 2024-04-10 RX ADMIN — AMLODIPINE BESYLATE 10 MG: 5 TABLET ORAL at 08:04

## 2024-04-10 RX ADMIN — SODIUM CHLORIDE, SODIUM LACTATE, POTASSIUM CHLORIDE, AND CALCIUM CHLORIDE: .6; .31; .03; .02 INJECTION, SOLUTION INTRAVENOUS at 10:04

## 2024-04-10 RX ADMIN — Medication 81 MG: at 03:04

## 2024-04-10 RX ADMIN — PROPOFOL 100 MG: 10 INJECTION, EMULSION INTRAVENOUS at 10:04

## 2024-04-10 NOTE — PT/OT/SLP PROGRESS
Speech Language Pathology      Nell Smith  MRN: [id number]    Attempted to re administer MoCA. She recalls ST prior recent hospitalization. She appears very anxious and ready to go home; states she is discharging today. She is awaiting lunch tray. She denies any changes in speech, language or cognition. Will follow-up tomorrow if pt still here to re administer to MoCA, however, she appears to be at baseline.      Speaking Coherently

## 2024-04-10 NOTE — ANESTHESIA PREPROCEDURE EVALUATION
04/10/2024  Nell Smith is a 63 y.o., female.      Patient Active Problem List   Diagnosis    Generalized anxiety disorder    Type 2 diabetes mellitus with diabetic neuropathy, with long-term current use of insulin    Acute focal neurological deficit    Transaminitis    Hypertensive urgency    Type 2 diabetes mellitus without complication, with long-term current use of insulin    Acute CVA (cerebrovascular accident)       Past Surgical History:   Procedure Laterality Date     SECTION       SECTION       SECTION       SECTION      WISDOM TOOTH EXTRACTION      at age 18; x 4        Tobacco Use:  The patient  reports that she has never smoked. She has never used smokeless tobacco.     Results for orders placed or performed during the hospital encounter of 24   ECG 12 lead    Collection Time: 24  5:32 PM   Result Value Ref Range    QRS Duration 72 ms    [facility] QTC Calculation 455 ms    Narrative    Test Reason : I63.9,    Vent. Rate : 083 BPM     Atrial Rate : 083 BPM     P-R Int : 136 ms          QRS Dur : 072 ms      QT Int : 388 ms       P-R-T Axes : 061 063 263 degrees     QTc Int : 455 ms    Normal sinus rhythm with sinus arrhythmia  ST and T wave abnormality, consider inferior ischemia  ST and T wave abnormality, consider anterolateral ischemia  Abnormal ECG  When compared with ECG of 2024 20:12,  ST now depressed in Anterior-lateral leads  T wave inversion now evident in Inferior leads  T wave inversion now evident in Anterior-lateral leads    Referred By: JETT CASAS           Confirmed By:         Imaging Results               US Abdomen Limited (Final result)  Result time 24 01:58:47      Final result by Vignesh Pugh MD (24 01:58:47)                   Impression:      Large calculus within the gallbladder lumen as  well as extensive echogenic material throughout the gallbladder lumen with possible increased vascularity.  Evaluation of sonographic Jamison sign is reported negative, although limited by prior administration of analgesic medication.  Consider further evaluation with contrast-enhanced CT or MRI/MRCP to exclude underlying acute cholecystitis and/or gallbladder neoplasm.    This report was flagged in Epic as abnormal.      Electronically signed by: Vignesh Pugh MD  Date:    04/09/2024  Time:    01:58               Narrative:    EXAMINATION:  US ABDOMEN LIMITED    CLINICAL HISTORY:  transaminitis;    TECHNIQUE:  Limited ultrasound of the right upper quadrant of the abdomen (including pancreas, liver, gallbladder, common bile duct, and spleen) was performed.    COMPARISON:  None.    FINDINGS:  Liver: Normal in size, measuring 13.5 cm.  No focal abnormality of the visualized hepatic parenchyma.    Gallbladder: Gallbladder is distended.  There is a large shadowing calculus identified in the gallbladder lumen.  Additionally, there is a significant volume of echogenic material throughout the gallbladder lumen with possible increased vascularity.  This may potentially represent large volume of biliary sludge, although gallbladder neoplasm not excluded.  The technologist reports a negative sonographic Jamison sign, noting assessment is limited by prior administration of analgesic medication.    Biliary system: The common duct is not dilated, measuring 6 mm.  No intrahepatic ductal dilatation.    Right kidney: No evidence of hydronephrosis..    Pancreas: Partially obscured by bowel gas.  Visualized portion appears grossly within normal limits.    Miscellaneous: No upper abdominal ascites.                                        MRI Brain Without Contrast (Final result)  Result time 04/08/24 23:56:07      Final result by Elio Bryant DO (04/08/24 23:56:07)                   Impression:      Several punctate acute  infarcts in the left cerebral hemisphere as above.    This report was flagged in Epic as abnormal.    Dr. Bryant discussed critical findings with Dr. Alonzo by telephone at 23:54 on 04/08/2024.      Electronically signed by: Elio Bryant  Date:    04/08/2024  Time:    23:56               Narrative:    EXAMINATION:  MRI BRAIN WITHOUT CONTRAST    CLINICAL HISTORY:  Transient ischemic attack (TIA);    TECHNIQUE:  Multiplanar multisequence MR imaging of the brain was performed without intravenous contrast.    COMPARISON:  CTA head and neck from earlier today taken at 17:19.    FINDINGS:  Mild motion artifact.  There are small foci of restricted diffusion in the left frontal and parietal lobes, compatible with acute infarcts.  There are 3 punctate foci in total.  There are mild chronic microvascular ischemic changes.  Ventricles are normal in size for age without evidence of hydrocephalus.  No intracranial mass or hemorrhage.  No extra-axial blood or fluid collections. Normal vascular flow voids.    Bone marrow signal intensity is normal. Paranasal sinuses and mastoid air cells are clear.                                       CTA Head and Neck (xpd) (Final result)  Result time 04/08/24 18:45:00      Final result by Vignesh Do MD (04/08/24 18:45:00)                   Impression:      1. Up to 50% luminal narrowing in the cavernous right ICA due to plaque.  Up to 50% luminal narrowing at the junction of the right cavernous and supraclinoid ICA segments, possibly on a congenital basis as there is no calcified plaque seen in the region.  2. No aneurysm or hemodynamically significant stenosis elsewhere along the head and neck arterial vasculature.  3. Nodule versus cyst along the right thyroid lobe.  This could be further characterized with ultrasound in an elective setting as warranted.      Electronically signed by: Vignesh Do  Date:    04/08/2024  Time:    18:45               Narrative:    EXAMINATION:  CTA  HEAD AND NECK (XPD)    CLINICAL HISTORY:  Neuro deficit, acute, stroke suspected;    TECHNIQUE:  Non contrast low dose axial images were obtained through the head.  CT angiogram was performed from the level of the neda to the top of the head following the IV administration of 100mL of Omnipaque 350.   Sagittal and coronal reconstructions and maximum intensity projection reconstructions were performed. Arterial stenosis percentages are based on NASCET measurement criteria.    COMPARISON:  None    FINDINGS:  No abnormal intracranial enhancement.  No hydrocephalus.  Globes normal contents are unremarkable.  Mild mucosal thickening of both ethmoid air cells and left maxillary sinus.  Mastoid air cells are clear.  Left maxillary periodontal disease.  No mass or abnormal enhancement of the aero digestive tract.  Subtle hypodense lesion right thyroid gland measures 1.2 cm series 2, image 72.  Prominent adenoid tonsillar tissue.  Remaining glandular tissue in the neck unremarkable.  Straightening of normal cervical lordosis with advanced degenerative disc disease at C6-7 and additional degenerative change at several other spinal levels.    Normal 3 vessel arch.  Right common carotid artery and carotid bulb are patent.  Right ICA exhibits plaque along the distal cavernous segment with up to 50% stenosis.  Up to 50% luminal narrowing in the supraclinoid ICA over a subcentimeter segment as seen axial series 2 images 222-224.  Left common carotid artery and carotid bulb are patent.  There is plaque along the cavernous left ICA with less than 50% stenosis.  There is narrowing at the junction of the cavernous and supraclinoid ICA segments on the left with less than 50% stenosis.  Bilateral MCAs and ACAs are patent.  There is an anterior communicating artery.    Right vertebral artery origin and course are patent.  Patient is left vertebral artery dominant.  Left vertebral artery origin and course are patent.  Basilar artery and  PCAs are patent.  There is a small right posterior communicating artery.  No left posterior communicating artery is visualized.                                       CT HEAD FOR STROKE (Final result)  Result time 04/08/24 17:39:07      Final result by Vignesh Do MD (04/08/24 17:39:07)                   Impression:      No intracranial hemorrhage.  No evidence for recent ischemia, noting however that MRI could add sensitivity in an acute setting.      Electronically signed by: Vignesh Do  Date:    04/08/2024  Time:    17:39               Narrative:    EXAMINATION:  CT HEAD FOR STROKE    CLINICAL HISTORY:  Neuro deficit, acute, stroke suspected;    TECHNIQUE:  Low dose axial images were obtained through the head.  Coronal and sagittal reformations were also performed. Contrast was not administered.    COMPARISON:  04/03/2024    FINDINGS:  Normal size of the ventricular system. No hemorrhage or major vascular distribution infarct. No intra or extra-axial mass. No mass effect or midline shift. Included orbits are unremarkable. Paranasal sinuses and mastoid air cells are clear. No acute osseous abnormality.  Atherosclerosis of the carotid siphons.                                       Lab Results   Component Value Date    WBC 6.05 04/10/2024    HGB 8.9 (L) 04/10/2024    HCT 26.5 (L) 04/10/2024    MCV 88 04/10/2024     04/10/2024     BMP  Lab Results   Component Value Date     04/10/2024    K 4.0 04/10/2024     04/10/2024    CO2 25 04/10/2024    BUN 20 04/10/2024    CREATININE 0.8 04/10/2024    CALCIUM 9.0 04/10/2024    ANIONGAP 5 (L) 04/10/2024     (H) 04/10/2024     04/09/2024     (H) 04/08/2024       Results for orders placed during the hospital encounter of 04/08/24    Echo Saline Bubble? Yes    Interpretation Summary    Left Ventricle: The left ventricle is normal in size. Normal wall thickness. There is concentric remodeling. Mild global hypokinesis present.  There is low normal systolic function with a visually estimated ejection fraction of 50 - 55%. There is normal diastolic function.    Right Ventricle: Normal right ventricular cavity size. Wall thickness is normal. Right ventricle wall motion  is normal. Systolic function is normal.    IVC/SVC: Normal venous pressure at 3 mmHg.              Pre-op Assessment    I have reviewed the Patient Summary Reports.     I have reviewed the Nursing Notes. I have reviewed the NPO Status.   I have reviewed the Medications.     Review of Systems  Anesthesia Hx:  No problems with previous Anesthesia             Denies Family Hx of Anesthesia complications.    Denies Personal Hx of Anesthesia complications.                    Social:  Non-Smoker       Hematology/Oncology:  Hematology Normal                                     Cardiovascular:     Hypertension, poorly controlled                                        Pulmonary:  Pulmonary Normal                       Hepatic/GI:      Liver Disease, (elevated LFTs)            Musculoskeletal:  Musculoskeletal Normal                Neurological:  TIA, CVA, no residual symptoms                                    Endocrine:  Diabetes, well controlled, type 2, using insulin           Psych:  Psychiatric History anxiety                 Physical Exam  General: Well nourished, Cooperative, Alert and Oriented    Airway:  Mallampati: III / II  Mouth Opening: Normal  TM Distance: Normal  Tongue: Normal  Neck ROM: Normal ROM    Chest/Lungs:  Clear to auscultation    Heart:  Rate: Normal  Rhythm: Regular Rhythm  Sounds: Normal    Abdomen:  Normal, Soft, Nontender        Anesthesia Plan  Type of Anesthesia, risks & benefits discussed:    Anesthesia Type: Gen Natural Airway  Intra-op Monitoring Plan: Standard ASA Monitors  Post Op Pain Control Plan:   (medical reason for not using multimodal pain management)  Induction:  IV  Informed Consent: Informed consent signed with the Patient and all parties  understand the risks and agree with anesthesia plan.  All questions answered. Patient consented to blood products? No  ASA Score: 3 Emergent  Anesthesia Plan Notes:   General Natural Airway  Propofol  Zofran    Ready For Surgery From Anesthesia Perspective.     .

## 2024-04-10 NOTE — PROGRESS NOTES
[facility] hospital  Department of Neurology  Progress Note  Date: 04/10/2024 4:01 PM          Patient Name: Nell Smith   MRN: [id number]   : 1960    AGE: 63 y.o.    LOS: 1 days Hospital Day: 3  Admit date: 2024  4:48 PM       04/10/2024: No acute events overnight. Patient was seen and examined by me this morning. Neuro exam is normal. She feels back to baseline.  Pending transesophageal echocardiogram.       Vitals:  Patient Vitals for the past 24 hrs:   BP Temp Temp src Pulse Resp SpO2   04/10/24 1400 (!) 165/74 -- -- 89 (!) 28 99 %   04/10/24 1330 -- -- -- 80 (!) 33 99 %   04/10/24 1300 -- -- -- 78 (!) 24 100 %   04/10/24 1230 (!) 140/63 -- -- 76 (!) 24 98 %   04/10/24 1200 (!) 146/64 -- -- 81 (!) 22 100 %   04/10/24 1145 (!) 147/67 -- -- 78 20 100 %   04/10/24 1140 136/65 -- -- 79 20 100 %   04/10/24 1135 134/65 -- -- 77 (!) 21 100 %   04/10/24 1130 125/63 -- -- 76 (!) 24 100 %   04/10/24 1125 136/63 -- -- 75 (!) 23 100 %   04/10/24 1120 (!) 111/58 -- -- 74 (!) 28 100 %   04/10/24 1115 (!) 108/55 -- -- 73 16 100 %   04/10/24 1000 (!) 148/64 -- -- 88 (!) 21 --   04/10/24 0800 (!) 160/64 -- -- 90 (!) 21 99 %   04/10/24 0745 -- 98.4 °F (36.9 °C) Oral -- -- --   04/10/24 0727 -- -- -- 88 (!) 24 100 %   04/10/24 0700 (!) 142/65 -- -- 83 20 100 %   04/10/24 0600 134/62 -- -- 76 14 98 %   04/10/24 0501 (!) 170/70 -- -- 76 18 99 %   04/10/24 0400 (!) 148/65 -- -- 73 (!) 21 100 %   04/10/24 0300 (!) 164/73 -- -- 78 18 100 %   04/10/24 0200 (!) 141/66 -- -- 75 20 99 %   04/10/24 0100 138/63 -- -- 77 18 98 %   04/10/24 0000 124/61 -- -- 75 17 97 %   24 2300 (!) 143/66 -- -- 77 16 96 %   24 2200 (!) 157/68 -- -- 77 (!) 24 100 %   24 2100 (!) 164/67 -- -- 86 (!) 21 99 %   24 2000 (!) 160/70 -- -- 85 20 100 %   24 1910 (!) 176/84 98.5 °F (36.9 °C) Oral 84 (!) 27 100 %   24 1905 -- -- -- 80 (!) 50 95 %   24 1800 (!) 156/70 -- -- 77 16 99 %   24 1700 (!) 164/71  -- -- 79 18 98 %     PHYSICAL EXAM:     GENERAL APPEARANCE: Well-developed, well-nourished female in no acute distress.  HEENT: Normocephalic and atraumatic. PERRL. Oropharynx unremarkable.  PULM: Comfortable on room air.  CV: RRR.  ABDOMEN: Soft, nontender.  EXTREMITIES: No signs of vascular compromise. Pulses present. No cyanosis, clubbing or edema.  SKIN: Clear; no rashes, lesions or skin breaks in exposed areas.      NEURO:   MENTAL STATUS: Patient awake and oriented to time, place, and person. Affect normal.  CRANIAL NERVES II-XII: Pupils equal, round and reactive to light. Extraocular movements full and intact. No facial asymmetry.  MOTOR: Normal bulk. Tone normal and symmetrical throughout.  No abnormal movements. No tremor.   Strength 5/5 throughout unless specified below.  REFLEXES: DTRs 2+; normal and symmetric throughout.   SENSATION: Sensation grossly intact to fine touch.  COORDINATION: Finger-to-nose normal for age and symmetric.  STATION: Romberg deferred.  GAIT: Deferred.    CURRENT SCHEDULED MEDICATIONS:   ALPRAZolam  1 mg Oral Once    amLODIPine  10 mg Oral Daily    aspirin  81 mg Oral Daily    atorvastatin  80 mg Oral QHS    clopidogreL  75 mg Oral Daily    enoxparin  40 mg Subcutaneous Daily    insulin degludec  14 Units Subcutaneous with breakfast    mupirocin   Nasal BID    senna-docusate 8.6-50 mg  1 tablet Oral Daily    sertraline  100 mg Oral Daily     CURRENT INFUSIONS:    DATA:  Recent Labs   Lab 04/03/24 2030 04/04/24 0444 04/08/24 1703 04/09/24 0426 04/10/24  0258    138 136 139 138   K 4.1 3.9 4.0 3.9 4.0    108 105 109 108   CO2 20* 23 20* 25 25   BUN 22 18 19 16 20   CREATININE 0.8 0.7 0.9 0.7 0.8   * 125* 136* 109 183*   CALCIUM 9.8 9.5 9.2 9.0 9.0   PHOS  --  3.9  --   --   --    MG  --  2.1  --  2.3 2.4   AST 17 19 192* 84* 30   ALT 9* 11 371* 249* 159*     Recent Labs   Lab 04/03/24 2030 04/04/24 0444 04/08/24 1703 04/09/24  0426 04/10/24  0258   WBC 6.93  "6.90 7.24 6.38 6.05   HGB 10.4* 10.1* 10.4* 8.8* 8.9*   HCT 30.8* 30.1* 30.3* 26.6* 26.5*    222 210 193 215     No results found for: "PROTEINCSF", "GLUCCSF", "WBCCSF", "RBCCSF", "PMNCSF"  Hemoglobin A1C   Date Value Ref Range Status   04/03/2024 4.1 (L) 4.5 - 6.2 % Final     Comment:     According to ADA guidelines, hemoglobin A1C <7.0% represents  optimal control in non-pregnant diabetic patients.  Different  metrics may apply to specific populations.   Standards of Medical Care in Diabetes - 2016.    For the purpose of screening for the presence of diabetes:  <5.7%     Consistent with the absence of diabetes  5.7-6.4%  Consistent with increasing risk for diabetes   (prediabetes)  >or=6.5%  Consistent with diabetes    Currently no consensus exists for use of hemoglobin A1C  for diagnosis of diabetes for children.     08/25/2022 4.0 4.0 - 6.0 % Final     Comment:     LESS THAN   03/03/2021 4.0 4.0 - 6.0 % Final            I have personally reviewed and interpreted the pertinent imaging and lab results.  Imaging Results               US Abdomen Limited (Final result)  Result time 04/09/24 01:58:47      Final result by Vignesh Pugh MD (04/09/24 01:58:47)                   Impression:      Large calculus within the gallbladder lumen as well as extensive echogenic material throughout the gallbladder lumen with possible increased vascularity.  Evaluation of sonographic Jamison sign is reported negative, although limited by prior administration of analgesic medication.  Consider further evaluation with contrast-enhanced CT or MRI/MRCP to exclude underlying acute cholecystitis and/or gallbladder neoplasm.    This report was flagged in Epic as abnormal.      Electronically signed by: Vignesh Pugh MD  Date:    04/09/2024  Time:    01:58               Narrative:    EXAMINATION:  US ABDOMEN LIMITED    CLINICAL HISTORY:  transaminitis;    TECHNIQUE:  Limited ultrasound of the right upper quadrant of the " abdomen (including pancreas, liver, gallbladder, common bile duct, and spleen) was performed.    COMPARISON:  None.    FINDINGS:  Liver: Normal in size, measuring 13.5 cm.  No focal abnormality of the visualized hepatic parenchyma.    Gallbladder: Gallbladder is distended.  There is a large shadowing calculus identified in the gallbladder lumen.  Additionally, there is a significant volume of echogenic material throughout the gallbladder lumen with possible increased vascularity.  This may potentially represent large volume of biliary sludge, although gallbladder neoplasm not excluded.  The technologist reports a negative sonographic Jamison sign, noting assessment is limited by prior administration of analgesic medication.    Biliary system: The common duct is not dilated, measuring 6 mm.  No intrahepatic ductal dilatation.    Right kidney: No evidence of hydronephrosis..    Pancreas: Partially obscured by bowel gas.  Visualized portion appears grossly within normal limits.    Miscellaneous: No upper abdominal ascites.                                        MRI Brain Without Contrast (Final result)  Result time 04/08/24 23:56:07      Final result by Elio Bryant DO (04/08/24 23:56:07)                   Impression:      Several punctate acute infarcts in the left cerebral hemisphere as above.    This report was flagged in Epic as abnormal.    Dr. Bryant discussed critical findings with Dr. Alonzo by telephone at 23:54 on 04/08/2024.      Electronically signed by: Elio Bryant  Date:    04/08/2024  Time:    23:56               Narrative:    EXAMINATION:  MRI BRAIN WITHOUT CONTRAST    CLINICAL HISTORY:  Transient ischemic attack (TIA);    TECHNIQUE:  Multiplanar multisequence MR imaging of the brain was performed without intravenous contrast.    COMPARISON:  CTA head and neck from earlier today taken at 17:19.    FINDINGS:  Mild motion artifact.  There are small foci of restricted diffusion in the left frontal  and parietal lobes, compatible with acute infarcts.  There are 3 punctate foci in total.  There are mild chronic microvascular ischemic changes.  Ventricles are normal in size for age without evidence of hydrocephalus.  No intracranial mass or hemorrhage.  No extra-axial blood or fluid collections. Normal vascular flow voids.    Bone marrow signal intensity is normal. Paranasal sinuses and mastoid air cells are clear.                                       CTA Head and Neck (xpd) (Final result)  Result time 04/08/24 18:45:00      Final result by Vignesh Do MD (04/08/24 18:45:00)                   Impression:      1. Up to 50% luminal narrowing in the cavernous right ICA due to plaque.  Up to 50% luminal narrowing at the junction of the right cavernous and supraclinoid ICA segments, possibly on a congenital basis as there is no calcified plaque seen in the region.  2. No aneurysm or hemodynamically significant stenosis elsewhere along the head and neck arterial vasculature.  3. Nodule versus cyst along the right thyroid lobe.  This could be further characterized with ultrasound in an elective setting as warranted.      Electronically signed by: Vignesh Do  Date:    04/08/2024  Time:    18:45               Narrative:    EXAMINATION:  CTA HEAD AND NECK (XPD)    CLINICAL HISTORY:  Neuro deficit, acute, stroke suspected;    TECHNIQUE:  Non contrast low dose axial images were obtained through the head.  CT angiogram was performed from the level of the neda to the top of the head following the IV administration of 100mL of Omnipaque 350.   Sagittal and coronal reconstructions and maximum intensity projection reconstructions were performed. Arterial stenosis percentages are based on NASCET measurement criteria.    COMPARISON:  None    FINDINGS:  No abnormal intracranial enhancement.  No hydrocephalus.  Globes normal contents are unremarkable.  Mild mucosal thickening of both ethmoid air cells and left  maxillary sinus.  Mastoid air cells are clear.  Left maxillary periodontal disease.  No mass or abnormal enhancement of the aero digestive tract.  Subtle hypodense lesion right thyroid gland measures 1.2 cm series 2, image 72.  Prominent adenoid tonsillar tissue.  Remaining glandular tissue in the neck unremarkable.  Straightening of normal cervical lordosis with advanced degenerative disc disease at C6-7 and additional degenerative change at several other spinal levels.    Normal 3 vessel arch.  Right common carotid artery and carotid bulb are patent.  Right ICA exhibits plaque along the distal cavernous segment with up to 50% stenosis.  Up to 50% luminal narrowing in the supraclinoid ICA over a subcentimeter segment as seen axial series 2 images 222-224.  Left common carotid artery and carotid bulb are patent.  There is plaque along the cavernous left ICA with less than 50% stenosis.  There is narrowing at the junction of the cavernous and supraclinoid ICA segments on the left with less than 50% stenosis.  Bilateral MCAs and ACAs are patent.  There is an anterior communicating artery.    Right vertebral artery origin and course are patent.  Patient is left vertebral artery dominant.  Left vertebral artery origin and course are patent.  Basilar artery and PCAs are patent.  There is a small right posterior communicating artery.  No left posterior communicating artery is visualized.                                       CT HEAD FOR STROKE (Final result)  Result time 04/08/24 17:39:07      Final result by Vignesh Do MD (04/08/24 17:39:07)                   Impression:      No intracranial hemorrhage.  No evidence for recent ischemia, noting however that MRI could add sensitivity in an acute setting.      Electronically signed by: Vignesh Do  Date:    04/08/2024  Time:    17:39               Narrative:    EXAMINATION:  CT HEAD FOR STROKE    CLINICAL HISTORY:  Neuro deficit, acute, stroke  suspected;    TECHNIQUE:  Low dose axial images were obtained through the head.  Coronal and sagittal reformations were also performed. Contrast was not administered.    COMPARISON:  04/03/2024    FINDINGS:  Normal size of the ventricular system. No hemorrhage or major vascular distribution infarct. No intra or extra-axial mass. No mass effect or midline shift. Included orbits are unremarkable. Paranasal sinuses and mastoid air cells are clear. No acute osseous abnormality.  Atherosclerosis of the carotid siphons.                                              ASSESSMENT AND PLAN:     Acute ischemic infarct   Hypertension      Workup  CTH: No acute intracranial abnormality   CTA head and neck:  No large vessel occlusion or high-grade stenosis  MRI brain:  Punctate acute infarcts in the left frontal, parietal lobes  ECHO:  EF 55%, negative for PFO, normal left atrium.  LDL: 88           Plan  Admitted for further stroke workup.  Etiology of stroke is unknown   Stroke prevention with aspirin 81 mg, Plavix 75 mg and Lipitor 80 mg.  Dual antiplatelet therapy for 3 weeks followed by monotherapy with Plavix alone.  Normalize BP  Transesophageal echocardiogram pending.    If transesophageal echo is negative, recommend outpatient event monitor for 4 weeks to rule out AFib.  PT OT  Speech therapy  DVT prophylaxis with chemo/SCD prophylaxis  Discussed lifestyle modifications as prophylactic measures for stroke prevention including, adequate blood pressure management, healthy diet and regular exercise.     Patient to follow up with [facility] at [phone] within 10-14 days from discharge.     Stroke education was provided including stroke risk factors modification and any acute neurological changes including weakness, confusion, visual changes to come straight to the ER.     All questions were answered.                                     Modesto Olivera MD  Neurology/vascular Neurology  Date of Service:  04/10/2024  4:01 PM    Please note: This note was transcribed using voice recognition software. Because of this technology there are often uinintended grammatical, spelling, and other transcription errors. Please disregard these errors.

## 2024-04-10 NOTE — TRANSFER OF CARE
"Anesthesia Transfer of Care Note    Patient: Nell Smith    Procedure(s) Performed: * No procedures listed *    Patient location: ICU    Anesthesia Type: general    Transport from OR: Transported from OR on room air with adequate spontaneous ventilation    Post pain: adequate analgesia    Post assessment: no apparent anesthetic complications    Post vital signs: stable    Level of consciousness: awake and alert    Nausea/Vomiting: no nausea/vomiting    Complications: none    Transfer of care protocol was followed      Last vitals: Visit Vitals  /62   Pulse 88   Temp 36.9 °C (98.5 °F) (Oral)   Resp (!) 24   Ht 5' 3" (1.6 m)   Wt 62.5 kg (137 lb 12.8 oz)   SpO2 100%   BMI 24.41 kg/m²     "

## 2024-04-10 NOTE — PLAN OF CARE
Discharge orders and chart reviewed. No other discharge needs noted at this time. Pt is clear for discharge from case management. Pt is discharging to home.    PCP apt scheduled and added to AVS     04/10/24 1525   Final Note   Assessment Type Final Discharge Note   Anticipated Discharge Disposition Home   What phone number can be called within the next 1-3 days to see how you are doing after discharge? [phone]   Hospital Resources/Appts/Education Provided Appointments scheduled and added to AVS   Post-Acute Status   Discharge Delays None known at this time

## 2024-04-10 NOTE — NURSING
[id number] Consents signed for GHAZALA/anesthesia. Timeout done. GHAZALA completed at bedside. Tolerated procedure well

## 2024-04-10 NOTE — CARE UPDATE
04/10/24 0727   Patient Assessment/Suction   Level of Consciousness (AVPU) alert   Respiratory Effort Normal;Unlabored   Expansion/Accessory Muscles/Retractions no use of accessory muscles   All Lung Fields Breath Sounds clear   Rhythm/Pattern, Respiratory unlabored   Cough Frequency infrequent   Cough Type dry   PRE-TX-O2   Device (Oxygen Therapy) room air   SpO2 100 %   Pulse Oximetry Type Continuous   $ Pulse Oximetry - Multiple Charge Pulse Oximetry - Multiple   Pulse 88   Resp (!) 24   Education   $ Education Other (see comment);15 min  (sats)

## 2024-04-10 NOTE — PLAN OF CARE
Plan of care reviewed and patient progressing toward goals. No complaints of numbness or tingling on right side. Patient refused Lipitor. Patient states she takes red yeast rice with CoQ10 to help lower cholesterol. Patient at baseline, possible D/C today.      Problem: Adult Inpatient Plan of Care  Goal: Plan of Care Review  Outcome: Ongoing, Progressing  Goal: Patient-Specific Goal (Individualized)  Outcome: Ongoing, Progressing  Goal: Absence of Hospital-Acquired Illness or Injury  Outcome: Ongoing, Progressing  Goal: Optimal Comfort and Wellbeing  Outcome: Ongoing, Progressing  Goal: Readiness for Transition of Care  Outcome: Ongoing, Progressing     Problem: Diabetes Comorbidity  Goal: Blood Glucose Level Within Targeted Range  Outcome: Ongoing, Progressing     Problem: Fall Injury Risk  Goal: Absence of Fall and Fall-Related Injury  Outcome: Ongoing, Progressing

## 2024-04-10 NOTE — PLAN OF CARE
Problem: Adult Inpatient Plan of Care  Goal: Plan of Care Review  Outcome: Ongoing, Progressing   AA&O.  Neuro intact. Denies any numbness or tingling of face or exts. GHAZALA done at bedside. SR on monitor.   1710  Discharge instructions given to pt and .  New meds reviewed.   Discharge to home accompanied by .

## 2024-04-10 NOTE — ANESTHESIA POSTPROCEDURE EVALUATION
Anesthesia Post Evaluation    Patient: Nell Smith    Procedure(s) Performed: * No procedures listed *    Final Anesthesia Type: general      Patient location during evaluation: ICU  Patient participation: Yes- Able to Participate  Level of consciousness: awake and alert and oriented  Post-procedure vital signs: reviewed and stable  Pain management: adequate  Airway patency: patent    PONV status at discharge: No PONV  Anesthetic complications: no      Cardiovascular status: blood pressure returned to baseline and hemodynamically stable  Respiratory status: unassisted, spontaneous ventilation and nasal cannula  Hydration status: euvolemic  Follow-up not needed.              Vitals Value Taken Time   BP 98/52 04/10/24 1125   Temp 36.9 °C (98.5 °F) 04/10/24 1120   Pulse 75 04/10/24 1123   Resp 21 04/10/24 1123   SpO2 100 % 04/10/24 1123         No case tracking events are documented in the log.      Pain/Mary Jane Score: No data recorded

## 2024-04-11 ENCOUNTER — PATIENT MESSAGE (OUTPATIENT)
Dept: FAMILY MEDICINE | Facility: CLINIC | Age: 64
End: 2024-04-11
Payer: MEDICAID

## 2024-04-11 NOTE — HOSPITAL COURSE
63-year-old  female with history of  IDDM, hypertension, hyperlipidemia presenting here for sudden onset of slurred speech and right upper arm numbness and tingling.  MRI showed Several punctate acute infarcts in the left cerebral hemisphere. patient had hypertensive emergency upon admission.  Patient recently has similar presentation but signed out AMA.  Patient admitted for further management, since admission patient right upper arm numbness tingling improved, slurred speech resolved, neuro deficits completely resolved.  MRI of the brain showed Several punctate acute infarcts in the left cerebral hemisphere as above.  Neurology was consulted, this is most likely secondary to hypertensive crisis however also need to rule out cardiac embolism, patient has a TTE and GHAZALA both done which is negative for embolism.  Patient cleared for discharge.   Patient also found to have mild elevated liver enzymes and hyperbilirubinemia,  MRI CP shows significant cholelithiasis, patient is completely asymptomatic, liver enzymes and bilirubin improved, patient cleared for discharge and no need further intervention.

## 2024-04-11 NOTE — DISCHARGE SUMMARY
[facility]  Discharge Summary      Patient Name: Nell Smith  MRN: [id number]  [facility]: [id number]  Patient Class: IP- Inpatient  Admission Date: 4/8/2024  Hospital Length of Stay: 1 days  Discharge Date and Time: 4/10/2024  5:10 PM  Attending Physician: No att. providers found   Discharging Provider: Julius Toussaint MD  Primary Care Provider: Deepti Kennedy MD    Primary Care Team: Networked reference to record PCT     HPI:   63-year-old female with past medical history of anxiety, hypertension, and diabetes presenting to the ER because of right hand numbness, slurred speech, and right facial droop.  Symptoms started at 4:30 p.m. today, and lasted for 5-10 minutes.  She checked her blood pressure prior to the symptoms, and it was elevated at 176/79.  Symptoms  Completely resolved prior to making it to the ER.  Per records, this happened to the patient's prior, and she was actually admitted on April 4 because of the same symptoms.  At that time, she was seen by Neurology.  They recommended MRI brain, but Patient declined MRI brain and further imaging secondary to claustrophobia.  Per records, Patient refused care and left AMA.. Patient's blood pressure was elevated during her last hospitalization and Norvasc was started.  She was also prescribed Plavix, aspirin and Lipitor per Neurology recommendations.  She comes back to the hospital today, requesting further workup bec of recurrent symptoms.     On arrival to the ER, vitals showed a blood pressure of 213/84, pulse of 87, respiratory rate of 18, afebrile.  CBC showed normocytic anemia.  CMP showed elevated LFTs with AST being 192, ALT being 371.  CT head was no acute intracranial process.  CTA head and neck showed No aneurysm or hemodynamically significant stenosis elsewhere along the head and neck arterial vasculature.  According to the ER provider, he did discuss the case with Neurology who recommended to allow for permissive  hypertension, and they will see the patient on consultation after admission. Pt will be admitted to medicine for further care.    * No surgery found *      Hospital Course:   63-year-old  female with history of  IDDM, hypertension, hyperlipidemia presenting here for sudden onset of slurred speech and right upper arm numbness and tingling.  MRI showed Several punctate acute infarcts in the left cerebral hemisphere. patient had hypertensive emergency upon admission.  Patient recently has similar presentation but signed out AMA.  Patient admitted for further management, since admission patient right upper arm numbness tingling improved, slurred speech resolved, neuro deficits completely resolved.  MRI of the brain showed Several punctate acute infarcts in the left cerebral hemisphere as above.  Neurology was consulted, this is most likely secondary to hypertensive crisis however also need to rule out cardiac embolism, patient has a TTE and GHAZALA both done which is negative for embolism.  Patient cleared for discharge.   Patient also found to have mild elevated liver enzymes and hyperbilirubinemia,  MRI CP shows significant cholelithiasis, patient is completely asymptomatic, liver enzymes and bilirubin improved, patient cleared for discharge and no need further intervention.      Goals of Care Treatment Preferences:  Code Status: Full Code      Consults:   Consults (From admission, onward)          Status Ordering Provider     Inpatient consult to neurology  Once        Provider:  Modesto Olivera MD    Completed ELIZABETH ROMERO JR            Neuro  * Acute CVA (cerebrovascular accident)    Antithrombotics for secondary stroke prevention: Antiplatelets: Aspirin: 81 mg daily  Clopidogrel: 75 mg daily    Statins for secondary stroke prevention and hyperlipidemia, if present:   Statins: Atorvastatin- 80 mg daily    Aggressive risk factor modification: HTN, DM, HLD     Rehab efforts: The patient has been evaluated  by a stroke team provider and the therapy needs have been fully considered based off the presenting complaints and exam findings. The following therapy evaluations are needed: PT evaluate and treat, OT evaluate and treat, SLP evaluate and treat    Diagnostics ordered/pending: HgbA1C to assess blood glucose levels, Lipid Profile to assess cholesterol levels, TTE to assess cardiac function/status     VTE prophylaxis: Enoxaparin 40 mg SQ every 24 hours    BP parameters: Infarct: No intervention, SBP <220      Patient most likely has multiple punctuate CVA secondary to uncontrolled hypertension diabetes,  patient also has mild carotid artery disease.   Continue aspirin Plavix and Lipitor.  Get PT OT evaluation, speech therapy evaluation  We will get 2D echo as well  Continue cardiac monitor  Neurology evaluation        Final Active Diagnoses:    Diagnosis Date Noted POA    PRINCIPAL PROBLEM:  Acute CVA (cerebrovascular accident) [I63.9] 04/09/2024 Yes    Transaminitis [R74.01] 04/08/2024 Yes    Hypertensive urgency [I16.0] 04/08/2024 Yes    Type 2 diabetes mellitus without complication, with long-term current use of insulin [E11.9, Z79.4] 04/08/2024 Not Applicable      Problems Resolved During this Admission:    Diagnosis Date Noted Date Resolved POA    TIA (transient ischemic attack) [G45.9] 04/08/2024 04/09/2024 Yes       Discharged Condition: stable    Disposition: Home or Self Care    Follow Up:   Follow-up Information       Deepti Kennedy MD. Go on 4/19/2024.    Specialty: Family Medicine  Why: hospital follow up appointment scheduled  Contact information:  [address] [place] [id number]  [phone]               Modesto Olivera MD. Schedule an appointment as soon as possible for a visit in 1 week(s).    Specialty: Neurology  Why: case management unable to schedule - please call clinic to schedule hospital follow up appointment  Contact information:  [address]  [id number]  [phone]                           Patient Instructions:   No discharge procedures on file.    Significant Diagnostic Studies: Labs: CMP   Recent Labs   Lab 04/10/24  0258      K 4.0      CO2 25   *   BUN 20   CREATININE 0.8   CALCIUM 9.0   PROT 6.5   ALBUMIN 4.1   BILITOT 1.1*   ALKPHOS 122   AST 30   *   ANIONGAP 5*    and CBC   Recent Labs   Lab 04/10/24  0258   WBC 6.05   HGB 8.9*   HCT 26.5*        Transesophageal echo (GHAZALA)    Result Date: 4/10/2024    Left Ventricle: There is normal systolic function with a visually estimated ejection fraction of 60 - 65%.   Right Ventricle: Normal right ventricular cavity size. Wall thickness is normal. Right ventricle wall motion  is normal. Systolic function is normal.   Left Atrium: There is no thrombus in the left atrial cavity.   Mitral Valve: There is no stenosis.   There is no cardiac source of emboli noted. Patient remained hemodynamically and neurologically stable during and postprocedure     Echo Saline Bubble? Yes    Result Date: 4/9/2024    Left Ventricle: The left ventricle is normal in size. Normal wall thickness. There is concentric remodeling. Mild global hypokinesis present. There is low normal systolic function with a visually estimated ejection fraction of 50 - 55%. There is normal diastolic function.   Right Ventricle: Normal right ventricular cavity size. Wall thickness is normal. Right ventricle wall motion  is normal. Systolic function is normal.   IVC/SVC: Normal venous pressure at 3 mmHg.     MRI MRCP Without Contrast    Result Date: 4/9/2024  EXAMINATION: MRI ABDOMEN WITHOUT CONTRAST MRCP CLINICAL HISTORY: hyperbilirubinemia; TECHNIQUE: Routine noncontrast MRI abdomen with MRCP. FINDINGS: Comparison the prior exams including ultrasound of 04/09/2024. The gallbladder contains heterogeneous signal intensity compatible was large gallstone and sludge, the gallstone measuring 43 mm in diameter.  There is no  gallbladder wall thickening or pericholecystic fluid, was dilated cystic duct without definite filling defects. The intrahepatic and extrahepatic bile ducts are normal in caliber, with the distal common bile duct tapering to the level of the MP will.  The main pancreatic duct is within normal limits, with no filling defects.  No findings of pancreas divisum.  No abnormal focal hepatic diffusion restriction. There is a 10 mm T2 hyperintense cyst in the pancreatic tail, with tiny T2 hyperintense bilateral simple renal cortical cysts.  The liver, pancreas, adrenal glands and kidneys otherwise have normal signal intensity.  No hydronephrosis.  There are no enlarged upper abdominal lymph nodes, with no ascites.     1. Cholelithiasis and gallbladder sludge, with dilated cystic duct without apparent cause. 2. Otherwise negative MRCP. 3. A 10 mm simple appearing cyst in the pancreatic tail, with tiny bilateral simple renal cysts. Electronically signed by: Des Bustamante Date:    04/09/2024 Time:    12:55    US Abdomen Limited    Result Date: 4/9/2024  EXAMINATION: US ABDOMEN LIMITED CLINICAL HISTORY: transaminitis; TECHNIQUE: Limited ultrasound of the right upper quadrant of the abdomen (including pancreas, liver, gallbladder, common bile duct, and spleen) was performed. COMPARISON: None. FINDINGS: Liver: Normal in size, measuring 13.5 cm.  No focal abnormality of the visualized hepatic parenchyma. Gallbladder: Gallbladder is distended.  There is a large shadowing calculus identified in the gallbladder lumen.  Additionally, there is a significant volume of echogenic material throughout the gallbladder lumen with possible increased vascularity.  This may potentially represent large volume of biliary sludge, although gallbladder neoplasm not excluded.  The technologist reports a negative sonographic Jamison sign, noting assessment is limited by prior administration of analgesic medication. Biliary system: The common duct is not  dilated, measuring 6 mm.  No intrahepatic ductal dilatation. Right kidney: No evidence of hydronephrosis.. Pancreas: Partially obscured by bowel gas.  Visualized portion appears grossly within normal limits. Miscellaneous: No upper abdominal ascites.     Large calculus within the gallbladder lumen as well as extensive echogenic material throughout the gallbladder lumen with possible increased vascularity.  Evaluation of sonographic Jamison sign is reported negative, although limited by prior administration of analgesic medication.  Consider further evaluation with contrast-enhanced CT or MRI/MRCP to exclude underlying acute cholecystitis and/or gallbladder neoplasm. This report was flagged in Epic as abnormal. Electronically signed by: Vignesh Pugh MD Date:    04/09/2024 Time:    01:58    MRI Brain Without Contrast    Result Date: 4/8/2024  EXAMINATION: MRI BRAIN WITHOUT CONTRAST CLINICAL HISTORY: Transient ischemic attack (TIA); TECHNIQUE: Multiplanar multisequence MR imaging of the brain was performed without intravenous contrast. COMPARISON: CTA head and neck from earlier today taken at 17:19. FINDINGS: Mild motion artifact.  There are small foci of restricted diffusion in the left frontal and parietal lobes, compatible with acute infarcts.  There are 3 punctate foci in total.  There are mild chronic microvascular ischemic changes.  Ventricles are normal in size for age without evidence of hydrocephalus.  No intracranial mass or hemorrhage.  No extra-axial blood or fluid collections. Normal vascular flow voids. Bone marrow signal intensity is normal. Paranasal sinuses and mastoid air cells are clear.     Several punctate acute infarcts in the left cerebral hemisphere as above. This report was flagged in Epic as abnormal. Dr. Bryant discussed critical findings with Dr. Alonzo by telephone at 23:54 on 04/08/2024. Electronically signed by: Elio Bryant Date:    04/08/2024 Time:    23:56    CTA Head and Neck  (xpd)    Result Date: 4/8/2024  EXAMINATION: CTA HEAD AND NECK (XPD) CLINICAL HISTORY: Neuro deficit, acute, stroke suspected; TECHNIQUE: Non contrast low dose axial images were obtained through the head.  CT angiogram was performed from the level of the neda to the top of the head following the IV administration of 100mL of Omnipaque 350.   Sagittal and coronal reconstructions and maximum intensity projection reconstructions were performed. Arterial stenosis percentages are based on NASCET measurement criteria. COMPARISON: None FINDINGS: No abnormal intracranial enhancement.  No hydrocephalus.  Globes normal contents are unremarkable.  Mild mucosal thickening of both ethmoid air cells and left maxillary sinus.  Mastoid air cells are clear.  Left maxillary periodontal disease.  No mass or abnormal enhancement of the aero digestive tract.  Subtle hypodense lesion right thyroid gland measures 1.2 cm series 2, image 72.  Prominent adenoid tonsillar tissue.  Remaining glandular tissue in the neck unremarkable.  Straightening of normal cervical lordosis with advanced degenerative disc disease at C6-7 and additional degenerative change at several other spinal levels. Normal 3 vessel arch.  Right common carotid artery and carotid bulb are patent.  Right ICA exhibits plaque along the distal cavernous segment with up to 50% stenosis.  Up to 50% luminal narrowing in the supraclinoid ICA over a subcentimeter segment as seen axial series 2 images 222-224.  Left common carotid artery and carotid bulb are patent.  There is plaque along the cavernous left ICA with less than 50% stenosis.  There is narrowing at the junction of the cavernous and supraclinoid ICA segments on the left with less than 50% stenosis.  Bilateral MCAs and ACAs are patent.  There is an anterior communicating artery. Right vertebral artery origin and course are patent.  Patient is left vertebral artery dominant.  Left vertebral artery origin and course are  patent.  Basilar artery and PCAs are patent.  There is a small right posterior communicating artery.  No left posterior communicating artery is visualized.     1. Up to 50% luminal narrowing in the cavernous right ICA due to plaque.  Up to 50% luminal narrowing at the junction of the right cavernous and supraclinoid ICA segments, possibly on a congenital basis as there is no calcified plaque seen in the region. 2. No aneurysm or hemodynamically significant stenosis elsewhere along the head and neck arterial vasculature. 3. Nodule versus cyst along the right thyroid lobe.  This could be further characterized with ultrasound in an elective setting as warranted. Electronically signed by: Vignesh Do Date:    04/08/2024 Time:    18:45    CT HEAD FOR STROKE    Result Date: 4/8/2024  EXAMINATION: CT HEAD FOR STROKE CLINICAL HISTORY: Neuro deficit, acute, stroke suspected; TECHNIQUE: Low dose axial images were obtained through the head.  Coronal and sagittal reformations were also performed. Contrast was not administered. COMPARISON: 04/03/2024 FINDINGS: Normal size of the ventricular system. No hemorrhage or major vascular distribution infarct. No intra or extra-axial mass. No mass effect or midline shift. Included orbits are unremarkable. Paranasal sinuses and mastoid air cells are clear. No acute osseous abnormality.  Atherosclerosis of the carotid siphons.     No intracranial hemorrhage.  No evidence for recent ischemia, noting however that MRI could add sensitivity in an acute setting. Electronically signed by: Vignesh Do Date:    04/08/2024 Time:    17:39    US Carotid Bilateral    Result Date: 4/3/2024  EXAM DESCRIPTION: US CAROTID DOPPLER BILATERAL CLINICAL HISTORY: Velocities evaluation. TECHNIQUE: Real time grey scale and Doppler ultrasound were utilized to evaluate the bilateral carotid arteries. COMPARISON: None. FINDINGS: All velocities in cm/sec. RIGHT CAROTID: Morphology: No significant plaque. Peak  Systolic Velocities (PSV) (Normal <125): Proximal CCA: 57 Proximal ICA: 30 Mid ICA: 41 Distal ICA: 42 ECA: 65 Right vertebral: Antegrade flow, 41 ICA/CCA PSV ratio: 0.7 (Normal <2.0) End Diastolic Velocities (EDV) (Normal <40) Proximal ICA: 9 Mid ICA: 15 Distal ICA: 22 LEFT CAROTID: Morphology: No significant plaque. Peak Systolic Velocities (PSV) (Normal <125): Proximal CCA: 69 Proximal ICA: 20 Mid ICA: 30 Distal ICA: 25 ECA: 66 Left vertebral: Antegrade flow, 82 ICA/CCA PSV ratio: 0.4 (Normal <2.0) End Diastolic Velocities (EDV) (Normal <40) Proximal ICA: 4 Mid ICA: 5 Distal ICA: 8 Some of the tracings demonstrate a somewhat irregular heart rhythm. IMPRESSION: 1.  No stenosis of the bilateral carotid arteries. 2.  Irregular heart rhythm. Electronically signed by:  Rani Kenny MD  04/03/2024 10:54 PM CDT Workstation: [id number]    CT Head Without Contrast    Result Date: 4/3/2024   ADDENDUM #1 STROKE CODE RESULT: Pertinent findings were communicated by phone to Dr. Vignesh Jarquin on 4/3/2024 at 8:27 PM CDT. Electronically signed by:  Rani Kenny MD  04/03/2024 08:30 PM CDT Workstation: [id number]  ORIGINAL REPORT EXAM DESCRIPTION: CT HEAD WITHOUT IV CONTRAST CLINICAL HISTORY: Neuro deficit, acute, stroke suspected. Numbness in right hand. Also history yesterday afternoon, only lasted a few minutes. Over 2 days has been mixing upwards and just feels off. TECHNIQUE: Multiple axial images were obtained through the brain without intravenous contrast. Coronal and sagittal images were reconstructed. All CT scans at this facility use dose modulation, iterative reconstruction, and/or weight based dosing when appropriate to reduce radiation dose to as low as reasonably achievable. COMPARISON: None. FINDINGS: Of note, the most inferior portion of the posterior fossa is not included on the scan, with the inferior margin of the cerebellum and brain stem not included. There is no evidence of acute intracranial  hemorrhage or acute major territorial infarction. The ventricles, sulci and cisterns are normal in size. There is no midline shift. No abnormal extra-axial fluid collections are seen. The brain parenchyma is grossly unremarkable. Vascular calcifications. The visualized portions of the paranasal sinuses are clear bilaterally. The mastoid air cells are clear bilaterally. The calvarium appears grossly unremarkable. IMPRESSION: 1.  No evidence of acute intracranial abnormality. 2.  Of note, the most inferior portion of the posterior fossa is not included on the scan, with the inferior margin of the cerebellum and brain stem not included. If there is any clinical concern for pathology within the floor of the posterior fossa, repeat CT could be considered. Electronically signed by:  Rani Kenny MD  04/03/2024 08:17 PM CDT Workstation: [id number]  - pulls last radiology orders    Pending Diagnostic Studies:       None           Medications:  Reconciled Home Medications:      Medication List        START taking these medications      losartan 50 MG tablet  Commonly known as: COZAAR  Take 1 tablet (50 mg total) by mouth once daily.            CHANGE how you take these medications      amLODIPine 10 MG tablet  Commonly known as: NORVASC  Take 1 tablet (10 mg total) by mouth once daily.  What changed:   medication strength  how much to take            CONTINUE taking these medications      aspirin 81 MG EC tablet  Commonly known as: ECOTRIN  Take 1 tablet (81 mg total) by mouth once daily.     atorvastatin 40 MG tablet  Commonly known as: LIPITOR  Take 2 tablets (80 mg total) by mouth once daily.     clopidogreL 75 mg tablet  Commonly known as: PLAVIX  Take 1 tablet (75 mg total) by mouth once daily. for 21 days     conjugated estrogens vaginal cream  Commonly known as: PREMARIN  Place 1 g vaginally daily as needed.     insulin aspart U-100 100 unit/mL (3 mL) Inpn pen  Commonly known as: NovoLOG  Inject 2 Units into the  skin 2 (two) times daily with meals.     metFORMIN 500 mg 24hr tablet  Commonly known as: FORTAMET  Take 1 tablet (500 mg total) by mouth 2 (two) times a day. Take 1 tablet orally twice daily     progesterone micronized 4 % Gel  Commonly known as: CRINONE  Place 1 application  vaginally daily as needed.     RED YEAST RICE ORAL  Take 1 capsule by mouth 2 (two) times a day.     TRESIBA FLEXTOUCH U-100 100 unit/mL (3 mL) insulin pen  Generic drug: insulin degludec  Inject 14 Units into the skin once daily.            STOP taking these medications      sertraline 100 MG tablet  Commonly known as: ZOLOFT              Indwelling Lines/Drains at time of discharge:   Lines/Drains/Airways       None                   Time spent on the discharge of patient: 35 minutes      Julius Toussaint MD  Department of Hospital Medicine  [facility]

## 2024-04-12 LAB — HBA1C MFR BLD: <4.2 % (ref 4.8–5.6)

## 2024-04-15 ENCOUNTER — TELEPHONE (OUTPATIENT)
Dept: FAMILY MEDICINE | Facility: CLINIC | Age: 64
End: 2024-04-15
Payer: MEDICAID

## 2024-04-15 NOTE — TELEPHONE ENCOUNTER
----- Message from Felisa Velázquez sent at 4/15/2024 11:08 AM CDT -----  Regarding: Appointment Virtual Reschedule Request  Contact: daughter at [phone]  Type:  Appointment Virtual Reschedule Request    Name of Caller:  daughter at [phone]    Symptoms:  hospital follow up    Additional Information:  question about if virtual appointment could be done instead or located in [place] due to distance. Please call and advise. Thank you

## 2024-04-15 NOTE — TELEPHONE ENCOUNTER
I am not licensed in [place] and can not do virtual visits outside of [place].    I would advise that being that she is doing a hospital follow up that this appointment be in person.

## 2024-04-15 NOTE — TELEPHONE ENCOUNTER
Pt has upcoming HFU with NP. Daughter would like to know if can change to VV> please advise if you are ok with doing VV

## 2024-04-24 LAB
OHS QRS DURATION: 66 MS
OHS QTC CALCULATION: 420 MS

## 2024-04-29 LAB
OHS QRS DURATION: 72 MS
OHS QTC CALCULATION: 455 MS

## 2024-05-30 ENCOUNTER — TELEPHONE (OUTPATIENT)
Dept: FAMILY MEDICINE | Facility: CLINIC | Age: 64
End: 2024-05-30

## 2024-05-30 NOTE — TELEPHONE ENCOUNTER
----- Message from Kenna Kc sent at 5/30/2024  2:29 PM CDT -----  Contact: self  Type: Needs Medical Advice  Who Called:  pt  Best Call Back Number: [phone]   Additional Information: pt would like to know a little more about dr arnold and dr funk please call.pt is trying to establish with a new

## 2024-05-30 NOTE — TELEPHONE ENCOUNTER
Contacted number listed in chart for call back.  answered. Advised that Dr. Macdonald's panel is closed for new medicaid.  verbalized understanding. Advised to call number on back of insurance card for further assistance.

## 2024-07-09 ENCOUNTER — PATIENT MESSAGE (OUTPATIENT)
Dept: ADMINISTRATIVE | Facility: HOSPITAL | Age: 64
End: 2024-07-09
Payer: MEDICAID

## 2024-07-15 PROBLEM — I63.9 ACUTE CVA (CEREBROVASCULAR ACCIDENT): Status: RESOLVED | Noted: 2024-04-09 | Resolved: 2024-07-15

## 2024-11-27 NOTE — RESPIRATORY THERAPY
04/09/24 1905   Patient Assessment/Suction   Level of Consciousness (AVPU) alert   Respiratory Effort Normal;Unlabored   Rhythm/Pattern, Respiratory pattern regular;unlabored   Cough Frequency infrequent   Cough Type good;nonproductive   PRE-TX-O2   Device (Oxygen Therapy) room air   SpO2 95 %   Pulse Oximetry Type Continuous   $ Pulse Oximetry - Multiple Charge Pulse Oximetry - Multiple   Pulse 80   Resp (!) 50   Education   $ Education 15 min        X Size Of Lesion In Cm (Optional): 0 Introduction Text (Please End With A Colon): The following procedure was deferred: Detail Level: Zone Procedure To Be Performed At Next Visit: Cryotherapy

## 2025-01-03 ENCOUNTER — HOSPITAL ENCOUNTER (OUTPATIENT)
Facility: HOSPITAL | Age: 65
Discharge: HOME | End: 2025-01-04
Attending: EMERGENCY MEDICINE | Admitting: INTERNAL MEDICINE
Payer: MEDICAID

## 2025-01-03 DIAGNOSIS — R47.01 EXPRESSIVE APHASIA: ICD-10-CM

## 2025-01-03 DIAGNOSIS — R29.818 TRANSIENT NEUROLOGIC DEFICIT: ICD-10-CM

## 2025-01-03 DIAGNOSIS — G45.9 TIA (TRANSIENT ISCHEMIC ATTACK): Primary | ICD-10-CM

## 2025-01-03 DIAGNOSIS — R29.90 STROKE-LIKE SYMPTOMS: ICD-10-CM

## 2025-01-03 PROBLEM — E04.1 THYROID NODULE: Status: ACTIVE | Noted: 2025-01-03

## 2025-01-03 PROBLEM — I65.29 CAROTID STENOSIS: Status: ACTIVE | Noted: 2025-01-03

## 2025-01-03 LAB
ALBUMIN SERPL BCP-MCNC: 4.9 G/DL (ref 3.5–5.2)
ALP SERPL-CCNC: 64 U/L (ref 55–135)
ALT SERPL W/O P-5'-P-CCNC: 12 U/L (ref 10–44)
ANION GAP SERPL CALC-SCNC: 6 MMOL/L (ref 8–16)
AST SERPL-CCNC: 18 U/L (ref 10–40)
BASOPHILS # BLD AUTO: 0.02 K/UL (ref 0–0.2)
BASOPHILS NFR BLD: 0.4 % (ref 0–1.9)
BILIRUB SERPL-MCNC: 1.1 MG/DL (ref 0.1–1)
BUN SERPL-MCNC: 21 MG/DL (ref 8–23)
CALCIUM SERPL-MCNC: 9.8 MG/DL (ref 8.7–10.5)
CHLORIDE SERPL-SCNC: 106 MMOL/L (ref 95–110)
CO2 SERPL-SCNC: 25 MMOL/L (ref 23–29)
CREAT SERPL-MCNC: 0.8 MG/DL (ref 0.5–1.4)
CREAT SERPL-MCNC: 0.8 MG/DL (ref 0.5–1.4)
DIFFERENTIAL METHOD BLD: ABNORMAL
EOSINOPHIL # BLD AUTO: 0.1 K/UL (ref 0–0.5)
EOSINOPHIL NFR BLD: 2.2 % (ref 0–8)
ERYTHROCYTE [DISTWIDTH] IN BLOOD BY AUTOMATED COUNT: 18.6 % (ref 11.5–14.5)
EST. GFR  (NO RACE VARIABLE): >60 ML/MIN/1.73 M^2
GLUCOSE SERPL-MCNC: 156 MG/DL (ref 70–110)
HCT VFR BLD AUTO: 30.3 % (ref 37–48.5)
HGB BLD-MCNC: 10 G/DL (ref 12–16)
IMM GRANULOCYTES # BLD AUTO: 0.03 K/UL (ref 0–0.04)
IMM GRANULOCYTES NFR BLD AUTO: 0.6 % (ref 0–0.5)
INR PPP: 0.9 (ref 0.8–1.2)
LYMPHOCYTES # BLD AUTO: 0.9 K/UL (ref 1–4.8)
LYMPHOCYTES NFR BLD: 18 % (ref 18–48)
MCH RBC QN AUTO: 30.5 PG (ref 27–31)
MCHC RBC AUTO-ENTMCNC: 33 G/DL (ref 32–36)
MCV RBC AUTO: 92 FL (ref 82–98)
MONOCYTES # BLD AUTO: 0.2 K/UL (ref 0.3–1)
MONOCYTES NFR BLD: 4.3 % (ref 4–15)
NEUTROPHILS # BLD AUTO: 3.7 K/UL (ref 1.8–7.7)
NEUTROPHILS NFR BLD: 74.5 % (ref 38–73)
NRBC BLD-RTO: 0 /100 WBC
PLATELET # BLD AUTO: 225 K/UL (ref 150–450)
PMV BLD AUTO: 11 FL (ref 9.2–12.9)
POCT GLUCOSE: 121 MG/DL (ref 70–110)
POCT GLUCOSE: 150 MG/DL (ref 70–110)
POTASSIUM SERPL-SCNC: 4.6 MMOL/L (ref 3.5–5.1)
PROT SERPL-MCNC: 7.5 G/DL (ref 6–8.4)
PROTHROMBIN TIME: 10.3 SEC (ref 9–12.5)
RBC # BLD AUTO: 3.28 M/UL (ref 4–5.4)
SAMPLE: NORMAL
SODIUM SERPL-SCNC: 137 MMOL/L (ref 136–145)
WBC # BLD AUTO: 4.9 K/UL (ref 3.9–12.7)

## 2025-01-03 PROCEDURE — 36415 COLL VENOUS BLD VENIPUNCTURE: CPT

## 2025-01-03 PROCEDURE — 93010 ELECTROCARDIOGRAM REPORT: CPT | Mod: ,,, | Performed by: INTERNAL MEDICINE

## 2025-01-03 PROCEDURE — 85610 PROTHROMBIN TIME: CPT | Performed by: EMERGENCY MEDICINE

## 2025-01-03 PROCEDURE — G0378 HOSPITAL OBSERVATION PER HR: HCPCS

## 2025-01-03 PROCEDURE — 82565 ASSAY OF CREATININE: CPT

## 2025-01-03 PROCEDURE — 82962 GLUCOSE BLOOD TEST: CPT

## 2025-01-03 PROCEDURE — 99285 EMERGENCY DEPT VISIT HI MDM: CPT | Mod: 25

## 2025-01-03 PROCEDURE — 36415 COLL VENOUS BLD VENIPUNCTURE: CPT | Performed by: EMERGENCY MEDICINE

## 2025-01-03 PROCEDURE — 85025 COMPLETE CBC W/AUTO DIFF WBC: CPT | Performed by: EMERGENCY MEDICINE

## 2025-01-03 PROCEDURE — 63600175 PHARM REV CODE 636 W HCPCS

## 2025-01-03 PROCEDURE — 83036 HEMOGLOBIN GLYCOSYLATED A1C: CPT

## 2025-01-03 PROCEDURE — 25500020 PHARM REV CODE 255: Performed by: EMERGENCY MEDICINE

## 2025-01-03 PROCEDURE — 93005 ELECTROCARDIOGRAM TRACING: CPT | Performed by: INTERNAL MEDICINE

## 2025-01-03 PROCEDURE — 80053 COMPREHEN METABOLIC PANEL: CPT | Performed by: EMERGENCY MEDICINE

## 2025-01-03 PROCEDURE — 96374 THER/PROPH/DIAG INJ IV PUSH: CPT

## 2025-01-03 RX ORDER — METFORMIN HYDROCHLORIDE 500 MG/1
500 TABLET, EXTENDED RELEASE ORAL 2 TIMES DAILY
COMMUNITY
Start: 2024-05-21

## 2025-01-03 RX ORDER — IBUPROFEN 200 MG
24 TABLET ORAL
Status: DISCONTINUED | OUTPATIENT
Start: 2025-01-03 | End: 2025-01-04 | Stop reason: HOSPADM

## 2025-01-03 RX ORDER — SERTRALINE HYDROCHLORIDE 50 MG/1
50 TABLET, FILM COATED ORAL EVERY MORNING
COMMUNITY
Start: 2024-08-01 | End: 2025-01-03

## 2025-01-03 RX ORDER — ASPIRIN 81 MG/1
81 TABLET ORAL DAILY
Status: DISCONTINUED | OUTPATIENT
Start: 2025-01-04 | End: 2025-01-04 | Stop reason: HOSPADM

## 2025-01-03 RX ORDER — ONDANSETRON HYDROCHLORIDE 2 MG/ML
4 INJECTION, SOLUTION INTRAVENOUS EVERY 6 HOURS PRN
Status: DISCONTINUED | OUTPATIENT
Start: 2025-01-03 | End: 2025-01-04 | Stop reason: HOSPADM

## 2025-01-03 RX ORDER — LOSARTAN POTASSIUM 100 MG/1
100 TABLET ORAL DAILY
COMMUNITY

## 2025-01-03 RX ORDER — BISACODYL 10 MG/1
10 SUPPOSITORY RECTAL DAILY PRN
Status: DISCONTINUED | OUTPATIENT
Start: 2025-01-03 | End: 2025-01-04 | Stop reason: HOSPADM

## 2025-01-03 RX ORDER — INSULIN GLARGINE 100 [IU]/ML
14 INJECTION, SOLUTION SUBCUTANEOUS DAILY
Status: DISCONTINUED | OUTPATIENT
Start: 2025-01-04 | End: 2025-01-04 | Stop reason: HOSPADM

## 2025-01-03 RX ORDER — SERTRALINE HYDROCHLORIDE 100 MG/1
100 TABLET, FILM COATED ORAL DAILY
COMMUNITY

## 2025-01-03 RX ORDER — SERTRALINE HYDROCHLORIDE 50 MG/1
100 TABLET, FILM COATED ORAL DAILY
Status: DISCONTINUED | OUTPATIENT
Start: 2025-01-04 | End: 2025-01-04 | Stop reason: HOSPADM

## 2025-01-03 RX ORDER — GLUCAGON 1 MG
1 KIT INJECTION
Status: DISCONTINUED | OUTPATIENT
Start: 2025-01-03 | End: 2025-01-04 | Stop reason: HOSPADM

## 2025-01-03 RX ORDER — LABETALOL HYDROCHLORIDE 5 MG/ML
10 INJECTION, SOLUTION INTRAVENOUS
Status: DISCONTINUED | OUTPATIENT
Start: 2025-01-03 | End: 2025-01-04 | Stop reason: HOSPADM

## 2025-01-03 RX ORDER — LORAZEPAM 2 MG/ML
0.5 INJECTION INTRAMUSCULAR
Status: COMPLETED | OUTPATIENT
Start: 2025-01-03 | End: 2025-01-03

## 2025-01-03 RX ORDER — ACETAMINOPHEN 325 MG/1
650 TABLET ORAL EVERY 6 HOURS PRN
Status: DISCONTINUED | OUTPATIENT
Start: 2025-01-03 | End: 2025-01-04 | Stop reason: HOSPADM

## 2025-01-03 RX ORDER — ATORVASTATIN CALCIUM 40 MG/1
40 TABLET, FILM COATED ORAL DAILY
Status: DISCONTINUED | OUTPATIENT
Start: 2025-01-04 | End: 2025-01-04 | Stop reason: HOSPADM

## 2025-01-03 RX ORDER — IBUPROFEN 200 MG
16 TABLET ORAL
Status: DISCONTINUED | OUTPATIENT
Start: 2025-01-03 | End: 2025-01-04 | Stop reason: HOSPADM

## 2025-01-03 RX ORDER — INSULIN ASPART 100 [IU]/ML
0-5 INJECTION, SOLUTION INTRAVENOUS; SUBCUTANEOUS
Status: DISCONTINUED | OUTPATIENT
Start: 2025-01-03 | End: 2025-01-04 | Stop reason: HOSPADM

## 2025-01-03 RX ORDER — SODIUM CHLORIDE 0.9 % (FLUSH) 0.9 %
10 SYRINGE (ML) INJECTION
Status: DISCONTINUED | OUTPATIENT
Start: 2025-01-03 | End: 2025-01-04 | Stop reason: HOSPADM

## 2025-01-03 RX ADMIN — LORAZEPAM 0.5 MG: 2 INJECTION, SOLUTION INTRAMUSCULAR; INTRAVENOUS at 09:01

## 2025-01-03 RX ADMIN — IOHEXOL 100 ML: 350 INJECTION, SOLUTION INTRAVENOUS at 12:01

## 2025-01-03 NOTE — H&P
[facility] - Emergency Dept  Hospital Medicine  History & Physical    Patient Name: Nell Smith  MRN: [id number]  Patient Class: OP- Observation  Admission Date: 1/3/2025  Attending Physician: Osito Carrillo MD   Primary Care Provider: Deepti Kennedy MD         Patient information was obtained from patient, past medical records, and ER records.     Subjective:     Principal Problem:Stroke-like symptoms    Chief Complaint:   Chief Complaint   Patient presents with    Altered Mental Status     States about 1hr pta, had episode of confusion. Thought she had another TIA since she had one about 9 months ago.         HPI: 64-year-old female presented to ED for eval of stroke-like symptoms. pMHx HTN, DM, anxiety, TIA.  Patient reported about 1 hour prior to arrival in ED she started having difficulty with her speech, states she did not feel like her words were coming out correctly despite knowing what she wanted to say.  By the time patient arrived in ED patient reported symptoms completely resolved.  Denies other focal neurological deficits.  Patient reports she has been under increased stress lately 2/2 holiday season. Denies chest pain, shortness of breath.  Denies fever, chills.  Denies abdominal pain, nausea, vomiting, changes in bowel habits.  Patient reports history of TIA, was on DAPT for 30 days and now takes baby aspirin daily, reports compliance with meds.  CTH impression without acute abnormality. CTA H/N impression with negative for large vessel intracranial arterial occlusion; short-segment areas of > 50% luminal stenosis involving the supraclinoid internal carotid arteries bilaterally; hypodense right thyroid lobe nodule, for which outpatient thyroid sonography is recommended.  Admit to hospital medicine for further eval.    Past Medical History:   Diagnosis Date    Anxiety disorder, unspecified     Diabetes mellitus     Hypertension        Past Surgical History:   Procedure Laterality  Date     SECTION       SECTION       SECTION       SECTION      WISDOM TOOTH EXTRACTION      at age 18; x 4       Review of patient's allergies indicates:   Allergen Reactions    Codeine     Penicillins        No current facility-administered medications on file prior to encounter.     Current Outpatient Medications on File Prior to Encounter   Medication Sig    amLODIPine (NORVASC) 10 MG tablet Take 1 tablet (10 mg total) by mouth once daily.    aspirin (ECOTRIN) 81 MG EC tablet Take 1 tablet (81 mg total) by mouth once daily.    conjugated estrogens (PREMARIN) vaginal cream Place 1 g vaginally daily as needed.    losartan (COZAAR) 100 MG tablet Take 100 mg by mouth once daily.    metFORMIN (GLUCOPHAGE-XR) 500 MG ER 24hr tablet Take 500 mg by mouth 2 (two) times a day.    RED YEAST RICE ORAL Take 1 capsule by mouth 2 (two) times a day.    sertraline (ZOLOFT) 100 MG tablet Take 100 mg by mouth once daily.    TRESIBA FLEXTOUCH U-100 100 unit/mL (3 mL) insulin pen Inject 14 Units into the skin once daily.    clopidogreL (PLAVIX) 75 mg tablet Take 1 tablet (75 mg total) by mouth once daily. for 21 days    insulin aspart U-100 (NOVOLOG) 100 unit/mL (3 mL) InPn pen Inject 2 Units into the skin 2 (two) times daily with meals.    [DISCONTINUED] atorvastatin (LIPITOR) 40 MG tablet Take 2 tablets (80 mg total) by mouth once daily.    [DISCONTINUED] losartan (COZAAR) 50 MG tablet Take 1 tablet (50 mg total) by mouth once daily.    [DISCONTINUED] metFORMIN (FORTAMET) 500 mg 24hr tablet Take 1 tablet (500 mg total) by mouth 2 (two) times a day. Take 1 tablet orally twice daily    [DISCONTINUED] progesterone micronized (CRINONE) 4 % Gel Place 1 application  vaginally daily as needed.    [DISCONTINUED] sertraline (ZOLOFT) 50 MG tablet Take 50 mg by mouth every morning.     Family History       Problem Relation (Age of Onset)    Hypertension Mother          Tobacco Use    Smoking  status: Never    Smokeless tobacco: Never   Substance and Sexual Activity    Alcohol use: Not Currently    Drug use: Never     Comment: CBD oil     Sexual activity: Yes     Review of Systems   Constitutional:  Negative for chills and fever.   HENT:  Negative for congestion and sore throat.    Eyes:  Negative for visual disturbance.   Respiratory:  Negative for shortness of breath.    Cardiovascular:  Negative for chest pain.   Gastrointestinal:  Negative for abdominal pain, constipation, diarrhea, nausea and vomiting.   Genitourinary:  Negative for flank pain.   Musculoskeletal:  Positive for myalgias. Negative for gait problem.   Neurological:  Positive for speech difficulty. Negative for facial asymmetry, weakness and numbness.   Psychiatric/Behavioral:  The patient is nervous/anxious.      Objective:     Vital Signs (Most Recent):  Temp: 98.3 °F (36.8 °C) (01/03/25 1147)  Pulse: 90 (01/03/25 1408)  Resp: 14 (01/03/25 1408)  BP: (!) 178/80 (01/03/25 1408)  SpO2: 100 % (01/03/25 1408) Vital Signs (24h Range):  Temp:  [98.3 °F (36.8 °C)] 98.3 °F (36.8 °C)  Pulse:  [90-95] 90  Resp:  [14-18] 14  SpO2:  [99 %-100 %] 100 %  BP: (178-192)/(80-92) 178/80     Weight: 62.1 kg (137 lb)  Body mass index is 24.27 kg/m².     Physical Exam  Vitals reviewed.   Constitutional:       General: She is not in acute distress.  HENT:      Head: Normocephalic and atraumatic.      Nose: Nose normal.      Mouth/Throat:      Mouth: Mucous membranes are moist.   Eyes:      Pupils: Pupils are equal, round, and reactive to light.   Cardiovascular:      Rate and Rhythm: Normal rate and regular rhythm.   Pulmonary:      Effort: Pulmonary effort is normal. No respiratory distress.      Breath sounds: Normal breath sounds.   Abdominal:      General: Bowel sounds are normal.      Palpations: Abdomen is soft.   Musculoskeletal:         General: Normal range of motion.      Cervical back: Normal range of motion.      Right lower leg: No edema.       "Left lower leg: No edema.   Skin:     General: Skin is warm and dry.   Neurological:      Mental Status: She is alert and oriented to person, place, and time.      Cranial Nerves: No cranial nerve deficit.      Sensory: No sensory deficit.      Motor: No weakness.   Psychiatric:         Mood and Affect: Mood is anxious.              CRANIAL NERVES     CN III, IV, VI   Pupils are equal, round, and reactive to light.       Significant Labs: All pertinent labs within the past 24 hours have been reviewed.  CBC:   Recent Labs   Lab 01/03/25  1158   WBC 4.90   HGB 10.0*   HCT 30.3*        CMP:   Recent Labs   Lab 01/03/25  1158      K 4.6      CO2 25   *   BUN 21   CREATININE 0.8   CALCIUM 9.8   PROT 7.5   ALBUMIN 4.9   BILITOT 1.1*   ALKPHOS 64   AST 18   ALT 12   ANIONGAP 6*     Cardiac Markers: No results for input(s): "CKMB", "MYOGLOBIN", "BNP", "TROPISTAT" in the last 48 hours.  Magnesium: No results for input(s): "MG" in the last 48 hours.  Troponin: No results for input(s): "TROPONINI", "TROPONINIHS" in the last 48 hours.  TSH: No results for input(s): "TSH" in the last 4320 hours.  Urine Studies: No results for input(s): "COLORU", "APPEARANCEUA", "PHUR", "SPECGRAV", "PROTEINUA", "GLUCUA", "KETONESU", "BILIRUBINUA", "OCCULTUA", "NITRITE", "UROBILINOGEN", "LEUKOCYTESUR", "RBCUA", "WBCUA", "BACTERIA", "SQUAMEPITHEL", "HYALINECASTS" in the last 48 hours.    Invalid input(s): "WRIGHTSUR"    Significant Imaging: I have reviewed all pertinent imaging results/findings within the past 24 hours.  Assessment/Plan:     * Stroke-like symptoms    Antithrombotics for secondary stroke prevention: Antiplatelets: Aspirin: 81 mg daily    Statins for secondary stroke prevention and hyperlipidemia, if present:   Statins: Atorvastatin- 40 mg daily    Aggressive risk factor modification: HTN, DM     Rehab efforts: The patient has been evaluated by a stroke team provider and the therapy needs have been fully " considered based off the presenting complaints and exam findings. The following therapy evaluations are needed: PT evaluate and treat, OT evaluate and treat, SLP evaluate and treat    Diagnostics ordered/pending: CT scan of head without contrast to asses brain parenchyma, CTA Head to assess vasculature , CTA Neck/Arch to assess vasculature, HgbA1C to assess blood glucose levels, Lipid Profile to assess cholesterol levels, MRI head without contrast to assess brain parenchyma, TTE to assess cardiac function/status , TSH to assess thyroid function    VTE prophylaxis: Mechanical prophylaxis: Place SCDs    BP parameters: TIA: SBP <220 until imaging confirmation of no infarct      Neuro checks    Carotid stenosis  See above  CTA H/N 1/3/24 impression with short-segment areas of > 50% luminal stenosis involving the supraclinoid internal carotid arteries bilaterally   Consult tele neurology for recs    Thyroid nodule  CTA H/N impression with hypodense right thyroid lobe nodule  Needs outpatient follow up recs    Type 2 diabetes mellitus without complication, with long-term current use of insulin  Patient's FSGs are controlled on current medication regimen.  Last A1c reviewed-   Lab Results   Component Value Date    HGBA1C 4.1 (L) 04/03/2024     Most recent fingerstick glucose reviewed-   Recent Labs   Lab 01/03/25  1150   POCTGLUCOSE 150*     Current correctional scale  Low  Maintain anti-hyperglycemic dose as follows-   Antihyperglycemics (From admission, onward)      Start     Stop Route Frequency Ordered    01/04/25 0900  insulin glargine U-100 (Lantus) pen 14 Units         -- SubQ Daily 01/03/25 1546    01/03/25 1646  insulin aspart U-100 pen 0-5 Units         -- SubQ Before meals & nightly PRN 01/03/25 1546          Hold Oral hypoglycemics while patient is in the hospital.       VTE Risk Mitigation (From admission, onward)           Ordered     IP VTE LOW RISK PATIENT  Once         01/03/25 1547     Place sequential  compression device  Until discontinued         01/03/25 1547                         On 01/03/2025, patient should be placed in hospital observation services under my care in collaboration with Dr. Carrillo.           Kristi Avila NP  Department of Hospital Medicine  [facility] Emergency Dept

## 2025-01-03 NOTE — ASSESSMENT & PLAN NOTE
Antithrombotics for secondary stroke prevention: Antiplatelets: Aspirin: 81 mg daily    Statins for secondary stroke prevention and hyperlipidemia, if present:   Statins: Atorvastatin- 40 mg daily    Aggressive risk factor modification: HTN, DM     Rehab efforts: The patient has been evaluated by a stroke team provider and the therapy needs have been fully considered based off the presenting complaints and exam findings. The following therapy evaluations are needed: PT evaluate and treat, OT evaluate and treat, SLP evaluate and treat    Diagnostics ordered/pending: CT scan of head without contrast to asses brain parenchyma, CTA Head to assess vasculature , CTA Neck/Arch to assess vasculature, HgbA1C to assess blood glucose levels, Lipid Profile to assess cholesterol levels, MRI head without contrast to assess brain parenchyma, TTE to assess cardiac function/status , TSH to assess thyroid function    VTE prophylaxis: Mechanical prophylaxis: Place SCDs    BP parameters: TIA: SBP <220 until imaging confirmation of no infarct      Neuro checks

## 2025-01-03 NOTE — SUBJECTIVE & OBJECTIVE
Past Medical History:   Diagnosis Date    Anxiety disorder, unspecified     Diabetes mellitus     Hypertension        Past Surgical History:   Procedure Laterality Date     SECTION       SECTION       SECTION       SECTION      WISDOM TOOTH EXTRACTION      at age 18; x 4       Review of patient's allergies indicates:   Allergen Reactions    Codeine     Penicillins        No current facility-administered medications on file prior to encounter.     Current Outpatient Medications on File Prior to Encounter   Medication Sig    amLODIPine (NORVASC) 10 MG tablet Take 1 tablet (10 mg total) by mouth once daily.    aspirin (ECOTRIN) 81 MG EC tablet Take 1 tablet (81 mg total) by mouth once daily.    conjugated estrogens (PREMARIN) vaginal cream Place 1 g vaginally daily as needed.    losartan (COZAAR) 100 MG tablet Take 100 mg by mouth once daily.    metFORMIN (GLUCOPHAGE-XR) 500 MG ER 24hr tablet Take 500 mg by mouth 2 (two) times a day.    RED YEAST RICE ORAL Take 1 capsule by mouth 2 (two) times a day.    sertraline (ZOLOFT) 100 MG tablet Take 100 mg by mouth once daily.    TRESIBA FLEXTOUCH U-100 100 unit/mL (3 mL) insulin pen Inject 14 Units into the skin once daily.    clopidogreL (PLAVIX) 75 mg tablet Take 1 tablet (75 mg total) by mouth once daily. for 21 days    insulin aspart U-100 (NOVOLOG) 100 unit/mL (3 mL) InPn pen Inject 2 Units into the skin 2 (two) times daily with meals.    [DISCONTINUED] atorvastatin (LIPITOR) 40 MG tablet Take 2 tablets (80 mg total) by mouth once daily.    [DISCONTINUED] losartan (COZAAR) 50 MG tablet Take 1 tablet (50 mg total) by mouth once daily.    [DISCONTINUED] metFORMIN (FORTAMET) 500 mg 24hr tablet Take 1 tablet (500 mg total) by mouth 2 (two) times a day. Take 1 tablet orally twice daily    [DISCONTINUED] progesterone micronized (CRINONE) 4 % Gel Place 1 application  vaginally daily as needed.    [DISCONTINUED] sertraline (ZOLOFT)  50 MG tablet Take 50 mg by mouth every morning.     Family History       Problem Relation (Age of Onset)    Hypertension Mother          Tobacco Use    Smoking status: Never    Smokeless tobacco: Never   Substance and Sexual Activity    Alcohol use: Not Currently    Drug use: Never     Comment: CBD oil     Sexual activity: Yes     Review of Systems   Constitutional:  Negative for chills and fever.   HENT:  Negative for congestion and sore throat.    Eyes:  Negative for visual disturbance.   Respiratory:  Negative for shortness of breath.    Cardiovascular:  Negative for chest pain.   Gastrointestinal:  Negative for abdominal pain, constipation, diarrhea, nausea and vomiting.   Genitourinary:  Negative for flank pain.   Musculoskeletal:  Positive for myalgias. Negative for gait problem.   Neurological:  Positive for speech difficulty. Negative for facial asymmetry, weakness and numbness.   Psychiatric/Behavioral:  The patient is nervous/anxious.      Objective:     Vital Signs (Most Recent):  Temp: 98.3 °F (36.8 °C) (01/03/25 1147)  Pulse: 90 (01/03/25 1408)  Resp: 14 (01/03/25 1408)  BP: (!) 178/80 (01/03/25 1408)  SpO2: 100 % (01/03/25 1408) Vital Signs (24h Range):  Temp:  [98.3 °F (36.8 °C)] 98.3 °F (36.8 °C)  Pulse:  [90-95] 90  Resp:  [14-18] 14  SpO2:  [99 %-100 %] 100 %  BP: (178-192)/(80-92) 178/80     Weight: 62.1 kg (137 lb)  Body mass index is 24.27 kg/m².     Physical Exam  Vitals reviewed.   Constitutional:       General: She is not in acute distress.  HENT:      Head: Normocephalic and atraumatic.      Nose: Nose normal.      Mouth/Throat:      Mouth: Mucous membranes are moist.   Eyes:      Pupils: Pupils are equal, round, and reactive to light.   Cardiovascular:      Rate and Rhythm: Normal rate and regular rhythm.   Pulmonary:      Effort: Pulmonary effort is normal. No respiratory distress.      Breath sounds: Normal breath sounds.   Abdominal:      General: Bowel sounds are normal.       "Palpations: Abdomen is soft.   Musculoskeletal:         General: Normal range of motion.      Cervical back: Normal range of motion.      Right lower leg: No edema.      Left lower leg: No edema.   Skin:     General: Skin is warm and dry.   Neurological:      Mental Status: She is alert and oriented to person, place, and time.      Cranial Nerves: No cranial nerve deficit.      Sensory: No sensory deficit.      Motor: No weakness.   Psychiatric:         Mood and Affect: Mood is anxious.              CRANIAL NERVES     CN III, IV, VI   Pupils are equal, round, and reactive to light.       Significant Labs: All pertinent labs within the past 24 hours have been reviewed.  CBC:   Recent Labs   Lab 01/03/25  1158   WBC 4.90   HGB 10.0*   HCT 30.3*        CMP:   Recent Labs   Lab 01/03/25  1158      K 4.6      CO2 25   *   BUN 21   CREATININE 0.8   CALCIUM 9.8   PROT 7.5   ALBUMIN 4.9   BILITOT 1.1*   ALKPHOS 64   AST 18   ALT 12   ANIONGAP 6*     Cardiac Markers: No results for input(s): "CKMB", "MYOGLOBIN", "BNP", "TROPISTAT" in the last 48 hours.  Magnesium: No results for input(s): "MG" in the last 48 hours.  Troponin: No results for input(s): "TROPONINI", "TROPONINIHS" in the last 48 hours.  TSH: No results for input(s): "TSH" in the last 4320 hours.  Urine Studies: No results for input(s): "COLORU", "APPEARANCEUA", "PHUR", "SPECGRAV", "PROTEINUA", "GLUCUA", "KETONESU", "BILIRUBINUA", "OCCULTUA", "NITRITE", "UROBILINOGEN", "LEUKOCYTESUR", "RBCUA", "WBCUA", "BACTERIA", "SQUAMEPITHEL", "HYALINECASTS" in the last 48 hours.    Invalid input(s): "WRIGHTSUR"    Significant Imaging: I have reviewed all pertinent imaging results/findings within the past 24 hours.  "

## 2025-01-03 NOTE — HPI
64-year-old female presented to ED for eval of stroke-like symptoms. pMHx HTN, DM, anxiety, TIA.  Patient reported about 1 hour prior to arrival in ED she started having difficulty with her speech, states she did not feel like her words were coming out correctly despite knowing what she wanted to say.  By the time patient arrived in ED patient reported symptoms completely resolved.  Denies other focal neurological deficits.  Patient reports she has been under increased stress lately 2/2 holiday season. Denies chest pain, shortness of breath.  Denies fever, chills.  Denies abdominal pain, nausea, vomiting, changes in bowel habits.  Patient reports history of TIA, was on DAPT for 30 days and now takes baby aspirin daily, reports compliance with meds.  CTH impression without acute abnormality. CTA H/N impression with negative for large vessel intracranial arterial occlusion; short-segment areas of > 50% luminal stenosis involving the supraclinoid internal carotid arteries bilaterally; hypodense right thyroid lobe nodule, for which outpatient thyroid sonography is recommended.  Admit to hospital medicine for further eval.

## 2025-01-03 NOTE — ED PROVIDER NOTES
Chief complaint:  Altered Mental Status (States about 1hr pta, had episode of confusion. Thought she had another TIA since she had one about 9 months ago. )      HPI:  Nell Smith is a 64 y.o. female with hx anxiety, DM, htn, prior TIA on aspirin presenting with episode of difficulty speaking marked by difficulty finding words for a period of 2 minutes occurring around 1 hour prior to arrival and completely resolved.  She denies other neurological symptoms.  She was in the process of cooking when this occurred and had difficulty communicating with family members.  There was possible difficulty reading immediately preceding.  She denies any persistent symptoms.    ROS: As per HPI and below:  No headache, chest pain, difficulty walking, difficulty swallowing, focal numbness or weakness, visual change.    Review of patient's allergies indicates:   Allergen Reactions    Codeine     Penicillins        Patient's Medications   New Prescriptions    No medications on file   Previous Medications    AMLODIPINE (NORVASC) 10 MG TABLET    Take 1 tablet (10 mg total) by mouth once daily.    ASPIRIN (ECOTRIN) 81 MG EC TABLET    Take 1 tablet (81 mg total) by mouth once daily.    CLOPIDOGREL (PLAVIX) 75 MG TABLET    Take 1 tablet (75 mg total) by mouth once daily. for 21 days    CONJUGATED ESTROGENS (PREMARIN) VAGINAL CREAM    Place 1 g vaginally daily as needed.    INSULIN ASPART U-100 (NOVOLOG) 100 UNIT/ML (3 ML) INPN PEN    Inject 2 Units into the skin 2 (two) times daily with meals.    LOSARTAN (COZAAR) 100 MG TABLET    Take 100 mg by mouth once daily.    METFORMIN (GLUCOPHAGE-XR) 500 MG ER 24HR TABLET    Take 500 mg by mouth 2 (two) times a day.    RED YEAST RICE ORAL    Take 1 capsule by mouth 2 (two) times a day.    SERTRALINE (ZOLOFT) 100 MG TABLET    Take 100 mg by mouth once daily.    TRESIBA FLEXTOUCH U-100 100 UNIT/ML (3 ML) INSULIN PEN    Inject 14 Units into the skin once daily.   Modified Medications    No  medications on file   Discontinued Medications    ATORVASTATIN (LIPITOR) 40 MG TABLET    Take 2 tablets (80 mg total) by mouth once daily.    LOSARTAN (COZAAR) 50 MG TABLET    Take 1 tablet (50 mg total) by mouth once daily.    METFORMIN (FORTAMET) 500 MG 24HR TABLET    Take 1 tablet (500 mg total) by mouth 2 (two) times a day. Take 1 tablet orally twice daily    PROGESTERONE MICRONIZED (CRINONE) 4 % GEL    Place 1 application  vaginally daily as needed.    SERTRALINE (ZOLOFT) 50 MG TABLET    Take 50 mg by mouth every morning.       PMH:  As per HPI and below:  Past Medical History:   Diagnosis Date    Anxiety disorder, unspecified     Diabetes mellitus     Hypertension      Past Surgical History:   Procedure Laterality Date     SECTION       SECTION       SECTION       SECTION      WISDOM TOOTH EXTRACTION      at age 18; x 4       Social History     Socioeconomic History    Marital status:    Tobacco Use    Smoking status: Never    Smokeless tobacco: Never   Substance and Sexual Activity    Alcohol use: Not Currently    Drug use: Never     Comment: CBD oil     Sexual activity: Yes       Family History   Problem Relation Name Age of Onset    Hypertension Mother         Physical Exam:    Vitals:    25 1603   BP: (!) 168/71   Pulse: 81   Resp: 16   Temp:      GENERAL:  No apparent distress.  Alert.    HEENT:  Moist mucous membranes.  Normocephalic and atraumatic.    NECK:  No swelling.  Midline trachea.   CARDIOVASCULAR:  Regular rate and rhythm.  2+ radial pulses.    PULMONARY:  Lungs clear to auscultation bilaterally.  No wheezes, rales, or rhonci.    ABDOMEN:  Non-tender and non-distended.    EXTREMITIES:  Warm and well perfused.  Brisk capillary refill.    NEUROLOGICAL:  Normal mental status.  Appropriate and conversant.  Normal gait.  No expressive or receptive aphasia.  No dysarthria.  Cranial nerves 3-12 intact.  No pronator drift.  5/5 strength with  equal sensation to light touch.  VAN negative.  NIHSS 0.  SKIN:  No rashes or ecchymoses.      Labs Reviewed   CBC W/ AUTO DIFFERENTIAL - Abnormal       Result Value    WBC 4.90      RBC 3.28 (*)     Hemoglobin 10.0 (*)     Hematocrit 30.3 (*)     MCV 92      MCH 30.5      MCHC 33.0      RDW 18.6 (*)     Platelets 225      MPV 11.0      Immature Granulocytes 0.6 (*)     Gran # (ANC) 3.7      Immature Grans (Abs) 0.03      Lymph # 0.9 (*)     Mono # 0.2 (*)     Eos # 0.1      Baso # 0.02      nRBC 0      Gran % 74.5 (*)     Lymph % 18.0      Mono % 4.3      Eosinophil % 2.2      Basophil % 0.4      Differential Method Automated      Narrative:     Release to patient->Immediate   COMPREHENSIVE METABOLIC PANEL - Abnormal    Sodium 137      Potassium 4.6      Chloride 106      CO2 25      Glucose 156 (*)     BUN 21      Creatinine 0.8      Calcium 9.8      Total Protein 7.5      Albumin 4.9      Total Bilirubin 1.1 (*)     Alkaline Phosphatase 64      AST 18      ALT 12      eGFR >60.0      Anion Gap 6 (*)     Narrative:     Release to patient->Immediate   POCT GLUCOSE - Abnormal    POCT Glucose 150 (*)    POCT GLUCOSE - Abnormal    POCT Glucose 121 (*)    PROTIME-INR    Prothrombin Time 10.3      INR 0.9      Narrative:     Release to patient->Immediate   HEPATITIS C ANTIBODY   HIV 1 / 2 ANTIBODY   URINALYSIS, REFLEX TO URINE CULTURE   HEMOGLOBIN A1C   ISTAT CREATININE    POC Creatinine 0.8      Sample VENOUS     POCT GLUCOSE MONITORING CONTINUOUS   POCT GLUCOSE MONITORING CONTINUOUS       Current Discharge Medication List        CONTINUE these medications which have NOT CHANGED    Details   amLODIPine (NORVASC) 10 MG tablet Take 1 tablet (10 mg total) by mouth once daily.  Qty: 90 tablet, Refills: 0    Comments: .      aspirin (ECOTRIN) 81 MG EC tablet Take 1 tablet (81 mg total) by mouth once daily.  Refills: 0      conjugated estrogens (PREMARIN) vaginal cream Place 1 g vaginally daily as needed.      losartan  (COZAAR) 100 MG tablet Take 100 mg by mouth once daily.      metFORMIN (GLUCOPHAGE-XR) 500 MG ER 24hr tablet Take 500 mg by mouth 2 (two) times a day.      RED YEAST RICE ORAL Take 1 capsule by mouth 2 (two) times a day.      sertraline (ZOLOFT) 100 MG tablet Take 100 mg by mouth once daily.      TRESIBA FLEXTOUCH U-100 100 unit/mL (3 mL) insulin pen Inject 14 Units into the skin once daily.      clopidogreL (PLAVIX) 75 mg tablet Take 1 tablet (75 mg total) by mouth once daily. for 21 days  Qty: 21 tablet, Refills: 0      insulin aspart U-100 (NOVOLOG) 100 unit/mL (3 mL) InPn pen Inject 2 Units into the skin 2 (two) times daily with meals.             Orders Placed This Encounter   Procedures    CT HEAD FOR STROKE    CTA Head and Neck (xpd)    MRI Brain Without Contrast    Hepatitis C Antibody    HIV 1/2 Ag/Ab (4th Gen)    CBC W/ AUTO DIFFERENTIAL    Comprehensive metabolic panel    Protime-INR    Comprehensive metabolic panel    Magnesium    Phosphorus    Lipid panel    Hemoglobin A1c    TSH    CBC auto differential    APTT    Protime-INR    Urinalysis, Reflex to Urine Culture Urine, Clean Catch    Hemoglobin A1c if not done in past 3 months    Diet NPO    Vital signs    Neuro checks:  LOC/AVPU, Orientation, GCS if LOC altered, Pupils if LOC altered or GCS <10, Speech/Language, Facial Symmetry, Motor    Vital signs    Do not treat BP unless > 220/120; Goal -219 and -119    Cardiac Monitoring - Adult    Neuro checks:  LOC/AVPU, Orientation, GCS if LOC altered, Pupils if LOC altered or GCS <10, Speech/Language, Facial Symmetry, Motor    Commode at bedside    Intake and output    Measure height or length    Weigh patient    Skin assessment    Passive range of motion to affected extremity    Provide stroke education    Contact MD/APC if patient requires a face mask to maintain adequate oxygenation    Straight Cath    Bladder scan    Complete NIH Stroke Scale PRN with any deterioration or worsening of  neurologic condition.    Complete NIH Stroke Scale Notify provider of NIHSS increase of 4 points or greater    Notify Stroke Provider    Notify Stroke Provider (specify)    Complete Manuel Screening Assessment No eating, drinking, or oral medications until patient passes the screen. Notify MD of results.    Place sequential compression device    Commode at bedside    If any glucose result is less than 50 or greater than 400:    If 2nd result is less than 50 or greater than 400:    If glucose greater than 400 mg/dL treat per correction scale.  If glucose remains elevated above 400 mg/dL at next scheduled check, notify provider    Do not admin Aspart correction between scheduled prandial Aspart    Recheck Blood Glucose:    Full code    Inpatient consult to Registered Dietitian/Nutritionist    IP consult to case management/social work    Consult to Telemedicine - Acute Stroke    OT evaluate and treat    PT evaluate and treat    Pulse Oximetry Q4H    Oxygen PRN    SLP Cognition and voice evaluate and treat    SLP Swallowing evaluate and treat    ECG 12 lead    Echo Saline Bubble? Yes    Insert peripheral IV    Place in Observation    Aspiration precautions    Fall precautions       Imaging Results              CTA Head and Neck (xpd) (Final result)  Result time 01/03/25 12:32:32      Final result by Triston Edwards MD (01/03/25 12:32:32)                   Impression:      Negative for large vessel intracranial arterial occlusion.    Short-segment areas of > 50% luminal stenosis involving the supraclinoid internal carotid arteries bilaterally.    Normal appearance of cervical carotid and vertebral arteries.    Hypodense right thyroid lobe nodule, for which outpatient thyroid sonography is recommended.      Electronically signed by: Triston Edwards  Date:    01/03/2025  Time:    12:32               Narrative:    EXAMINATION:  CTA HEAD AND NECK (XPD)    CLINICAL HISTORY:  Transient ischemic attack  (TIA);    TECHNIQUE:  CMS MANDATED QUALITY DATA - CT RADIATION - 436    All CT scans at this facility utilize dose modulation, iterative reconstruction, and/or weight based dosing when appropriate to reduce radiation dose to as low as reasonably achievable.    CMS MANDATED QUALITY DATA - CAROTID - 195    All measurements and percent stenosis described below were determined using NASCET criteria or criteria similar to NASCET, as defined by the Society of Radiologists in Ultrasound Consensus Conference, Radiology, 2003    Maximum intensity projection coronal and sagittal reformations were created at a separate workstation and stored in the patients permanent medical record.    100  cc Omnipaque 350 was administered.    COMPARISON:  CTA head neck 04/08/2024    FINDINGS:  Common origin of brachiocephalic and left common carotid arteries.  Bilateral subclavian arteries are patent.  Cervical vertebral arteries are patent with no findings of dissection or hemodynamically significant stenosis.  Bilateral common carotid, external carotid, and cervical internal carotid arteries are patent with no findings of dissection or hemodynamically significant stenosis.    Intracranial vertebral arteries, basilar artery, and bilateral posterior cerebral arteries are patent.  Cavernous carotid arteries show atherosclerotic plaque bilaterally.  Short-segment areas of greater than 50% luminal narrowing involving the supraclinoid internal carotid arteries bilaterally are again evident and similar to the reference examination.  Bilateral middle cerebral arteries are patent.  Anterior cerebral arteries are patent.    No abnormal intracranial contrast enhancement.  No acute cervical soft tissue pathology.  Hypodense nodule in the right thyroid lobe is stable.  Imaging through the lung apices shows no acute pathology.  Bone window images demonstrate degenerative discopathy at C6-7.                                       CT HEAD FOR STROKE (Final  result)  Result time 01/03/25 12:04:47      Final result by Marlon Acosta MD (01/03/25 12:04:47)                   Impression:      No acute intracranial process    RESULT NOTIFICATION: These observations were discussed by the dictating physician, by phone with, and acknowledged by Vignesh Jarquin at 12:02 on 1/3/2025.      Electronically signed by: Marlon Acosta  Date:    01/03/2025  Time:    12:04               Narrative:    CLINICAL HISTORY:  ([id number])65 y/o  (1960) F    Neuro deficit, acute, stroke suspected;    TECHNIQUE:  (A#[id number], exam time 1/3/2025 12:02)    CT HEAD FOR STROKE [id number]    Axial CT of the brain without contrast using soft tissue and bone algorithm. Please note in the acute setting if there is a clinical concern for an acute stroke MRI would be more sensitive/specific for evaluation of ischemia.    CMS MANDATED QUALITY DATA - CT RADIATION - 436    All CT scans at this facility utilize dose modulation, iterative reconstruction, and/or weight based dosing when appropriate to reduce radiation dose to as low as reasonably achievable.    COMPARISON:  4.8.24    FINDINGS:  No acute intracranial hemorrhage, edema or mass effect, and no acute parenchymal abnormality. There is no hydrocephalus, herniation or midline shift, and the basal and suprasellar cisterns are within normal limits. The osseous structures show no acute skull fracture. The ventricles and sulci are normal. There is normal gray white differentiation. Orbital contents appear within normal limits. External auditory canals are unremarkable. The visualized paranasal sinuses and mastoid air cells are essentially clear.                                  (Rad read)    ED Course as of 01/03/25 1750   Fri Jan 03, 2025   1213     Thrombolysis Candidate? No, Patient back to neurological baseline     Delays to Thrombolysis?  Not Applicable   [MR]   1304 EKG:  Sinus rhythm with sinus arrhythmia, rate of 80, normal intervals  and axis.  There are no acute ST or T wave changes suggestive of acute ischemia or infarction.  (Independently interpreted by me) [MR]      ED Course User Index  [MR] Vignesh Jarquin MD       MDM:    64 y.o. female with transient expressive aphasia concerning for TIA.  No current neurological symptoms.  Initial CT CTA per protocol ordered.  Additional laboratories per protocol also ordered with plan to observe patient.  No indication for tPA or other endovascular therapy given lack of ongoing symptoms or findings on exam.  Patient monitored without recurrent symptoms he will be admitted for observation with possible TIA.  Initial imaging without acute abnormality and once again very low suspicion for CVA.  I have discussed with hospital medicine who will assume care.    Diagnoses:    1. Speech difficulty   2. TIA       Vignesh Jaqruin MD  01/03/25 0944

## 2025-01-03 NOTE — ASSESSMENT & PLAN NOTE
Patient's FSGs are controlled on current medication regimen.  Last A1c reviewed-   Lab Results   Component Value Date    HGBA1C 4.1 (L) 04/03/2024     Most recent fingerstick glucose reviewed-   Recent Labs   Lab 01/03/25  1150   POCTGLUCOSE 150*     Current correctional scale  Low  Maintain anti-hyperglycemic dose as follows-   Antihyperglycemics (From admission, onward)      Start     Stop Route Frequency Ordered    01/04/25 0900  insulin glargine U-100 (Lantus) pen 14 Units         -- SubQ Daily 01/03/25 1546    01/03/25 1646  insulin aspart U-100 pen 0-5 Units         -- SubQ Before meals & nightly PRN 01/03/25 1546          Hold Oral hypoglycemics while patient is in the hospital.

## 2025-01-03 NOTE — PHARMACY MED REC
"      Admission Medication History     The home medication history was taken by Daly Kinsey.    You may go to "Admission" then "Reconcile Home Medications" tabs to review and/or act upon these items.     The home medication list has been updated by the Pharmacy department.   Please read ALL comments highlighted in yellow.   Please address this information as you see fit.    Feel free to contact us if you have any questions or require assistance.      The medications listed below were removed from the home medication list. Please reorder if appropriate:  Patient reports no longer taking the following medication(s):  Lipitor  Plavix  Zoloft 50 mg          Medications listed below were obtained from: Patient/family and Analytic software- [employer]  No current facility-administered medications on file prior to encounter.     Current Outpatient Medications on File Prior to Encounter   Medication Sig Dispense Refill    amLODIPine (NORVASC) 10 MG tablet Take 1 tablet (10 mg total) by mouth once daily. 90 tablet 0    aspirin (ECOTRIN) 81 MG EC tablet Take 1 tablet (81 mg total) by mouth once daily.  0    conjugated estrogens (PREMARIN) vaginal cream Place 1 g vaginally daily as needed.      losartan (COZAAR) 100 MG tablet Take 100 mg by mouth once daily.      metFORMIN (GLUCOPHAGE-XR) 500 MG ER 24hr tablet Take 500 mg by mouth 2 (two) times a day.      RED YEAST RICE ORAL Take 1 capsule by mouth 2 (two) times a day.      sertraline (ZOLOFT) 100 MG tablet Take 100 mg by mouth once daily.      TRESIBA FLEXTOUCH U-100 100 unit/mL (3 mL) insulin pen Inject 14 Units into the skin once daily.      clopidogreL (PLAVIX) 75 mg tablet Take 1 tablet (75 mg total) by mouth once daily. for 21 days 21 tablet 0    insulin aspart U-100 (NOVOLOG) 100 unit/mL (3 mL) InPn pen Inject 2 Units into the skin 2 (two) times daily with meals.      [DISCONTINUED] atorvastatin (LIPITOR) 40 MG tablet Take 2 tablets (80 mg total) by mouth once daily. 60 " tablet 0    [DISCONTINUED] losartan (COZAAR) 50 MG tablet Take 1 tablet (50 mg total) by mouth once daily. 90 tablet 0    [DISCONTINUED] metFORMIN (FORTAMET) 500 mg 24hr tablet Take 1 tablet (500 mg total) by mouth 2 (two) times a day. Take 1 tablet orally twice daily 60 tablet 5    [DISCONTINUED] progesterone micronized (CRINONE) 4 % Gel Place 1 application  vaginally daily as needed.      [DISCONTINUED] sertraline (ZOLOFT) 50 MG tablet Take 50 mg by mouth every morning.         Daly Kinsey  [id number]           .

## 2025-01-04 VITALS
OXYGEN SATURATION: 98 % | HEART RATE: 84 BPM | HEIGHT: 64 IN | WEIGHT: 139.31 LBS | TEMPERATURE: 98 F | BODY MASS INDEX: 23.78 KG/M2 | RESPIRATION RATE: 19 BRPM | DIASTOLIC BLOOD PRESSURE: 74 MMHG | SYSTOLIC BLOOD PRESSURE: 183 MMHG

## 2025-01-04 PROBLEM — G45.9 TIA (TRANSIENT ISCHEMIC ATTACK): Status: ACTIVE | Noted: 2025-01-03

## 2025-01-04 LAB
ALBUMIN SERPL BCP-MCNC: 4.4 G/DL (ref 3.5–5.2)
ALP SERPL-CCNC: 56 U/L (ref 55–135)
ALT SERPL W/O P-5'-P-CCNC: 10 U/L (ref 10–44)
ANION GAP SERPL CALC-SCNC: 7 MMOL/L (ref 8–16)
APTT PPP: 29.2 SEC (ref 21–32)
AST SERPL-CCNC: 17 U/L (ref 10–40)
BASOPHILS # BLD AUTO: 0.02 K/UL (ref 0–0.2)
BASOPHILS NFR BLD: 0.3 % (ref 0–1.9)
BILIRUB SERPL-MCNC: 1 MG/DL (ref 0.1–1)
BUN SERPL-MCNC: 15 MG/DL (ref 8–23)
CALCIUM SERPL-MCNC: 9.1 MG/DL (ref 8.7–10.5)
CHLORIDE SERPL-SCNC: 108 MMOL/L (ref 95–110)
CHOLEST SERPL-MCNC: 198 MG/DL (ref 120–199)
CHOLEST/HDLC SERPL: 4.1 {RATIO} (ref 2–5)
CO2 SERPL-SCNC: 26 MMOL/L (ref 23–29)
CREAT SERPL-MCNC: 0.7 MG/DL (ref 0.5–1.4)
DIFFERENTIAL METHOD BLD: ABNORMAL
EOSINOPHIL # BLD AUTO: 0.1 K/UL (ref 0–0.5)
EOSINOPHIL NFR BLD: 1.7 % (ref 0–8)
ERYTHROCYTE [DISTWIDTH] IN BLOOD BY AUTOMATED COUNT: 18.4 % (ref 11.5–14.5)
EST. GFR  (NO RACE VARIABLE): >60 ML/MIN/1.73 M^2
ESTIMATED AVG GLUCOSE: ABNORMAL MG/DL (ref 68–131)
ESTIMATED AVG GLUCOSE: ABNORMAL MG/DL (ref 68–131)
GLUCOSE SERPL-MCNC: 120 MG/DL (ref 70–110)
HBA1C MFR BLD: <4 % (ref 4.5–6.2)
HBA1C MFR BLD: <4 % (ref 4.5–6.2)
HCT VFR BLD AUTO: 29.2 % (ref 37–48.5)
HDLC SERPL-MCNC: 48 MG/DL (ref 40–75)
HDLC SERPL: 24.2 % (ref 20–50)
HGB BLD-MCNC: 9.8 G/DL (ref 12–16)
IMM GRANULOCYTES # BLD AUTO: 0.02 K/UL (ref 0–0.04)
IMM GRANULOCYTES NFR BLD AUTO: 0.3 % (ref 0–0.5)
INR PPP: 0.9 (ref 0.8–1.2)
LDLC SERPL CALC-MCNC: 122.2 MG/DL (ref 63–159)
LYMPHOCYTES # BLD AUTO: 1.3 K/UL (ref 1–4.8)
LYMPHOCYTES NFR BLD: 22.5 % (ref 18–48)
MAGNESIUM SERPL-MCNC: 2.4 MG/DL (ref 1.6–2.6)
MCH RBC QN AUTO: 30.1 PG (ref 27–31)
MCHC RBC AUTO-ENTMCNC: 33.6 G/DL (ref 32–36)
MCV RBC AUTO: 90 FL (ref 82–98)
MONOCYTES # BLD AUTO: 0.3 K/UL (ref 0.3–1)
MONOCYTES NFR BLD: 5.3 % (ref 4–15)
NEUTROPHILS # BLD AUTO: 4.1 K/UL (ref 1.8–7.7)
NEUTROPHILS NFR BLD: 69.9 % (ref 38–73)
NONHDLC SERPL-MCNC: 150 MG/DL
NRBC BLD-RTO: 0 /100 WBC
PHOSPHATE SERPL-MCNC: 4.2 MG/DL (ref 2.7–4.5)
PLATELET # BLD AUTO: 248 K/UL (ref 150–450)
PMV BLD AUTO: 11.7 FL (ref 9.2–12.9)
POCT GLUCOSE: 151 MG/DL (ref 70–110)
POCT GLUCOSE: 170 MG/DL (ref 70–110)
POTASSIUM SERPL-SCNC: 3.8 MMOL/L (ref 3.5–5.1)
PROT SERPL-MCNC: 6.7 G/DL (ref 6–8.4)
PROTHROMBIN TIME: 10.6 SEC (ref 9–12.5)
RBC # BLD AUTO: 3.26 M/UL (ref 4–5.4)
SODIUM SERPL-SCNC: 141 MMOL/L (ref 136–145)
TRIGL SERPL-MCNC: 139 MG/DL (ref 30–150)
TSH SERPL DL<=0.005 MIU/L-ACNC: 4.39 UIU/ML (ref 0.34–5.6)
WBC # BLD AUTO: 5.87 K/UL (ref 3.9–12.7)

## 2025-01-04 PROCEDURE — 94799 UNLISTED PULMONARY SVC/PX: CPT

## 2025-01-04 PROCEDURE — G0378 HOSPITAL OBSERVATION PER HR: HCPCS

## 2025-01-04 PROCEDURE — 80061 LIPID PANEL: CPT

## 2025-01-04 PROCEDURE — 94761 N-INVAS EAR/PLS OXIMETRY MLT: CPT

## 2025-01-04 PROCEDURE — 97161 PT EVAL LOW COMPLEX 20 MIN: CPT

## 2025-01-04 PROCEDURE — 84100 ASSAY OF PHOSPHORUS: CPT

## 2025-01-04 PROCEDURE — 85610 PROTHROMBIN TIME: CPT

## 2025-01-04 PROCEDURE — 92610 EVALUATE SWALLOWING FUNCTION: CPT

## 2025-01-04 PROCEDURE — 25000003 PHARM REV CODE 250: Performed by: FAMILY MEDICINE

## 2025-01-04 PROCEDURE — 80053 COMPREHEN METABOLIC PANEL: CPT

## 2025-01-04 PROCEDURE — 97165 OT EVAL LOW COMPLEX 30 MIN: CPT

## 2025-01-04 PROCEDURE — 99900031 HC PATIENT EDUCATION (STAT)

## 2025-01-04 PROCEDURE — 85025 COMPLETE CBC W/AUTO DIFF WBC: CPT

## 2025-01-04 PROCEDURE — 84443 ASSAY THYROID STIM HORMONE: CPT

## 2025-01-04 PROCEDURE — 99900035 HC TECH TIME PER 15 MIN (STAT)

## 2025-01-04 PROCEDURE — 92523 SPEECH SOUND LANG COMPREHEN: CPT

## 2025-01-04 PROCEDURE — 83735 ASSAY OF MAGNESIUM: CPT

## 2025-01-04 PROCEDURE — 36415 COLL VENOUS BLD VENIPUNCTURE: CPT

## 2025-01-04 PROCEDURE — 85730 THROMBOPLASTIN TIME PARTIAL: CPT

## 2025-01-04 PROCEDURE — 83036 HEMOGLOBIN GLYCOSYLATED A1C: CPT

## 2025-01-04 PROCEDURE — 25000003 PHARM REV CODE 250

## 2025-01-04 RX ORDER — AMLODIPINE BESYLATE 5 MG/1
10 TABLET ORAL DAILY
Status: DISCONTINUED | OUTPATIENT
Start: 2025-01-04 | End: 2025-01-04 | Stop reason: HOSPADM

## 2025-01-04 RX ORDER — LOSARTAN POTASSIUM 50 MG/1
50 TABLET ORAL DAILY
Status: DISCONTINUED | OUTPATIENT
Start: 2025-01-04 | End: 2025-01-04 | Stop reason: HOSPADM

## 2025-01-04 RX ORDER — ATORVASTATIN CALCIUM 40 MG/1
40 TABLET, FILM COATED ORAL DAILY
Qty: 30 TABLET | Refills: 2 | Status: SHIPPED | OUTPATIENT
Start: 2025-01-05 | End: 2025-04-05

## 2025-01-04 RX ORDER — CLOPIDOGREL BISULFATE 75 MG/1
75 TABLET ORAL DAILY
Qty: 30 TABLET | Refills: 2 | Status: SHIPPED | OUTPATIENT
Start: 2025-01-04 | End: 2025-04-04

## 2025-01-04 RX ADMIN — ASPIRIN 81 MG: 81 TABLET ORAL at 08:01

## 2025-01-04 RX ADMIN — LOSARTAN POTASSIUM 50 MG: 50 TABLET, FILM COATED ORAL at 02:01

## 2025-01-04 RX ADMIN — SERTRALINE HYDROCHLORIDE 100 MG: 50 TABLET ORAL at 08:01

## 2025-01-04 RX ADMIN — AMLODIPINE BESYLATE 10 MG: 5 TABLET ORAL at 02:01

## 2025-01-04 NOTE — PLAN OF CARE
[facility]  Initial Discharge Assessment       Primary Care Provider: Deepti Kennedy MD    Admission Diagnosis: Stroke-like symptoms [R29.90]    Admission Date: 1/3/2025  Expected Discharge Date: 1/6/2025    Transition of Care Barriers: None      CM completed assessment with patient and  at bedside. Patient lives at home with  and adult children. No advanced directives. No dialysis or Coumadin services. Patient verified demographics on facesheet.  will provide transportation at discharge.  PCP is Donya De La Torre. No discharge barriers identified.    Payor: MEDICAID / Plan: [place] [employer] / Product Type: Managed Medicaid /     Extended Emergency Contact Information  Primary Emergency Contact: Ingrid Neville  Home Phone: [phone]  Mobile Phone: [phone]  Relation: Daughter  Preferred language: English   needed? No    Discharge Plan A: Home  Discharge Plan B: Home      [id number] - [place] - [address] [facility]  [address]  [place] [address]  Phone: [phone] Fax: [phone]      Initial Assessment (most recent)       Adult Discharge Assessment - 01/04/25 1245          Discharge Assessment    Assessment Type Discharge Planning Assessment     Confirmed/corrected address, phone number and insurance Yes     Confirmed Demographics Correct on Facesheet     Source of Information patient     When was your last doctors appointment? 06/18/24     Communicated NAUN with patient/caregiver Yes     Reason For Admission STROKE SYMPTOMS     People in Home child(sammy), adult;spouse     Facility Arrived From: HOME     Do you expect to return to your current living situation? Yes     Do you have help at home or someone to help you manage your care at home? Yes     Prior to hospitilization cognitive status: Alert/Oriented     Current cognitive status: Alert/Oriented     Walking or Climbing Stairs Difficulty no      Dressing/Bathing Difficulty no     Home Accessibility wheelchair accessible     Home Layout Able to live on 1st floor     Equipment Currently Used at Home none     Readmission within 30 days? No     Patient currently being followed by outpatient case management? No     Do you currently have service(s) that help you manage your care at home? No     Do you take prescription medications? Yes     Do you have prescription coverage? Yes     Coverage [facility]     Do you have any problems affording any of your prescribed medications? No     Is the patient taking medications as prescribed? yes     Who is going to help you get home at discharge? ANALISA MARQUEZ     How do you get to doctors appointments? car, drives self     Are you on dialysis? No     Do you take coumadin? No     Discharge Plan A Home     Discharge Plan B Home     DME Needed Upon Discharge  none     Discharge Plan discussed with: Patient     Transition of Care Barriers None

## 2025-01-04 NOTE — PLAN OF CARE
01/04/25 0450   PRE-TX-O2   Device (Oxygen Therapy) room air   SpO2 100 %   Pulse Oximetry Type Intermittent   $ Pulse Oximetry - Multiple Charge Pulse Oximetry - Multiple   Pulse 85   Resp 18   Tobacco Cessation Intervention   Do you use any type of tobacco product? No   Respiratory Evaluation   $ Care Plan Tech Time 15 min   $ Respiratory Evaluation Complete   Evaluation For New Orders   Admitting Diagnosis stroke like symptoms   Cardiac Diagnosis HTN   Pulmonary Diagnosis Na   Home Oxygen   Has Home Oxygen? No   Home Aerosol, MDI, DPI, and Other Treatments/Therapies   Home Respiratory Therapy Per Patient/Review of Chart No   Oxygen Care Plan   Oxygen Care Plan Per Protocol   SPO2 Goal (%) 95% cardiac   Rationale MI/CVA/PE keep SpO2 > or equal to 95%   Bronchodilator Care Plan   Rationale No Rationale found   Atelectasis Care Plan   Rationale No Rational Found   Airway Clearance Care Plan   Rationale No rationale found

## 2025-01-04 NOTE — CONSULTS
[facility]  Adult Nutrition   Consult Note (Initial Assessment)     SUMMARY     Recommendations  1.) Continue diabetic diet restrictions. Will add cardiac restrictions.   2.) Will add Boost Glucose Support once daily to provide 190 kcals and 16gm protein.   3.) RD provided and attached cardiac diet restriction diet information.     Nutrition Goals:  PO intake will meet >75% of estimated needs by RD follow up., Oral supplement consumption of >50% by RD follow up., and Patient will verbalize understanding of diet principles prior to discharge.    Nutrition Interventions: Carbohydrate modified diet, Sodium modified diet, and Medical Food Supplement Therapy    Nutrition Diagnosis PES Statement: Food- and nutrition-related knowledge deficit related to lack of prior education as evidenced by new need for cardiac diet restrictions      Nutrition Diagnosis Status:   New    Dietitian Rounds Brief  RD covering remotely. 64 y.o. female with hx anxiety, DM, htn, prior TIA on aspirin presenting with episode of difficulty speaking marked by difficulty finding words for a period of 2 minutes occurring around 1 hour prior to arrival and completely resolved. Consult received for stroke pathway. Called and spoke to pt via phone who reports fair appetite PTA. She denies chew/swallowing issues. She reports following a diabetic diet at home and watches her salt intake. Provided verbal education on low Na diet. Informed pt diet information will be attached to her discharge paperwork. Pt appreciative. No GI distress. LBM 1/3. No skin breakdown per media photos. No risk for malnutriiton at this time.    Nutrition Related Social Determinants of Health:   Food Insecurity: Patient Declined (1/3/2025)    Hunger Vital Sign     Worried About Running Out of Food in the Last Year: Patient declined     Ran Out of Food in the Last Year: Patient declined     Malnutrition Assessment  Not warranted at this time. RD covering  "remotely.    Diet order:   Diabetic diet restrictions    Evaluation of Received Nutrient/Fluid Intake  Energy Calories Required: not meeting needs  Protein Required: not meeting needs  Fluid Required: meeting needs  Tolerance: tolerating     % Intake of Estimated Energy Needs: 0 - 25 %  % Meal Intake: 0 - 25 %      Intake/Output Summary (Last 24 hours) at 1/4/2025 0813  Last data filed at 1/4/2025 0009  Gross per 24 hour   Intake --   Output 400 ml   Net -400 ml        Anthropometrics  Temp: 97.5 °F (36.4 °C)  Height Method: Measured  Height: 5' 3.5" (161.3 cm)  Height (inches): 63.5 in  Weight Method: Standard Scale  Weight: 63.2 kg (139 lb 5.3 oz)  Weight (lb): 139.33 lb  Ideal Body Weight (IBW), Female: 117.5 lb  % Ideal Body Weight, Female (lb): 118.58 %  BMI (Calculated): 24.3  BMI Grade: 18.5-24.9 - normal       Estimated/Assessed Needs  Weight Used For Calorie Calculations: 63.2 kg (139 lb 5.3 oz)  Energy Calorie Requirements (kcal): 5527-9072  Energy Need Method: Kcal/kg (25-30)  Protein Requirements: 51-63 (0.8-1.0)  Weight Used For Protein Calculations: 63.2 kg (139 lb 5.3 oz)  Fluid Requirements (mL): 5471-2041     RDA Method (mL): 1580  CHO Requirement: 178gm CHO daily    Reason for Assessment  Reason For Assessment: consult  Nutrition Discharge Planning: Cardiac, diabetic diet    Final diagnoses:  [G45.9] TIA (transient ischemic attack) (Primary)  [R29.818] Transient neurologic deficit  [R47.01] Expressive aphasia  [R29.90] Stroke-like symptoms     Past Medical History:   Diagnosis Date    Anxiety disorder, unspecified     Diabetes mellitus     Hypertension         Nutrition/Diet History  Patient Reported Diet/Restrictions/Preferences: heart healthy, diabetic diet  Spiritual, Cultural Beliefs, Nondenominational Practices, Values that Affect Care: no  Food Allergies: NKFA    Nutrition Risk Screen        MST Score: 0  Have you recently lost weight without trying?: No  Weight loss score: 0  Have you been eating " poorly because of a decreased appetite?: No  Appetite score: 0       Weight History:  Wt Readings from Last 10 Encounters:   01/03/25 63.2 kg (139 lb 5.3 oz)   04/08/24 62.5 kg (137 lb 12.8 oz)   04/10/24 62.5 kg (137 lb 12.6 oz)   04/09/24 62.5 kg (137 lb 12.6 oz)   04/04/24 59 kg (130 lb 1.1 oz)   03/03/21 55.9 kg (123 lb 3.2 oz)   02/03/21 57.8 kg (127 lb 6.4 oz)        Lab/Procedures/Meds: Pertinent Labs/Meds Reviewed    Medications:Pertinent Medications Reviewed  Scheduled Meds:   aspirin  81 mg Oral Daily    atorvastatin  40 mg Oral Daily    insulin glargine U-100  14 Units Subcutaneous Daily    sertraline  100 mg Oral Daily     Continuous Infusions:  PRN Meds:.  Current Facility-Administered Medications:     acetaminophen, 650 mg, Oral, Q6H PRN    bisacodyL, 10 mg, Rectal, Daily PRN    dextrose 50%, 12.5 g, Intravenous, PRN    dextrose 50%, 25 g, Intravenous, PRN    glucagon (human recombinant), 1 mg, Intramuscular, PRN    glucose, 16 g, Oral, PRN    glucose, 24 g, Oral, PRN    insulin aspart U-100, 0-5 Units, Subcutaneous, QID (AC + HS) PRN    labetalol, 10 mg, Intravenous, Q15 Min PRN    ondansetron, 4 mg, Intravenous, Q6H PRN    sodium chloride 0.9%, 500 mL, Intravenous, PRN    sodium chloride 0.9%, 10 mL, Intravenous, PRN    Labs: Pertinent Labs Reviewed  Clinical Chemistry:  Recent Labs   Lab 01/03/25  1158 01/04/25  0429    141   K 4.6 3.8    108   CO2 25 26   * 120*   BUN 21 15   CREATININE 0.8 0.7   CALCIUM 9.8 9.1   PROT 7.5 6.7   ALBUMIN 4.9 4.4   BILITOT 1.1* 1.0   ALKPHOS 64 56   AST 18 17   ALT 12 10   ANIONGAP 6* 7*   MG  --  2.4   PHOS  --  4.2     CBC:   Recent Labs   Lab 01/04/25  0429   WBC 5.87   RBC 3.26*   HGB 9.8*   HCT 29.2*      MCV 90   MCH 30.1   MCHC 33.6     Lipid Panel:  Recent Labs   Lab 01/04/25  0429   CHOL 198   HDL 48   LDLCALC 122.2   TRIG 139   CHOLHDL 24.2     Diabetes:  Recent Labs   Lab 01/03/25  1150 01/03/25  1626 01/04/25  0800   POCTGLUCOSE  150* 121* 170*     Thyroid & Parathyroid:  Recent Labs   Lab 01/04/25  0429   TSH 4.390       Monitor and Evaluation  Food and Nutrient Intake: energy intake, food and beverage intake  Food and Nutrient Adminstration: diet order  Knowledge/Beliefs/Attitudes: food and nutrition knowledge/skill  Physical Activity and Function: nutrition-related ADLs and IADLs  Anthropometric Measurements: weight, weight change  Biochemical Data, Medical Tests and Procedures: electrolyte and renal panel, gastrointestinal profile, glucose/endocrine profile  Nutrition-Focused Physical Findings: overall appearance     Nutrition Risk  Level of Risk/Frequency of Follow-up:  (1x/week)     Nutrition Follow-Up  RD Follow-up?: Yes    Kenna Delaney RD 01/04/2025 8:20 AM

## 2025-01-04 NOTE — PLAN OF CARE
Problem: Oral Intake Inadequate  Goal: Improved Oral Intake  Outcome: Progressing  Intervention: Promote and Optimize Oral Intake  Flowsheets (Taken 1/4/2025 0820)  Oral Nutrition Promotion:   calorie-dense liquids provided   calorie-dense foods provided   nutrition counseling provided  Nutrition Interventions:   supplemental drinks provided   diet adjusted     Recommendations  1.) Continue diabetic diet restrictions. Will add cardiac restrictions.   2.) Will add Boost Glucose Support once daily to provide 190 kcals and 16gm protein.   3.) RD provided and attached cardiac diet restriction diet information.

## 2025-01-04 NOTE — PLAN OF CARE
Problem: Stroke, Ischemic (Includes Transient Ischemic Attack)  Goal: Optimal Coping  1/4/2025 1502 by Bessy Knott RN  Outcome: Met  1/4/2025 0743 by Bessy Knott RN  Outcome: Progressing  Goal: Effective Bowel Elimination  1/4/2025 1502 by Bessy Knott RN  Outcome: Met  1/4/2025 0743 by Bessy Knott RN  Outcome: Progressing  Goal: Optimal Cerebral Tissue Perfusion  1/4/2025 1502 by Bessy Knott RN  Outcome: Met  1/4/2025 0743 by Bessy Knott RN  Outcome: Progressing  Goal: Optimal Cognitive Function  1/4/2025 1502 by Bessy Knott RN  Outcome: Met  1/4/2025 0743 by Bessy Knott RN  Outcome: Progressing  Goal: Improved Communication Skills  1/4/2025 1502 by Bessy Knott RN  Outcome: Met  1/4/2025 0743 by Bessy Knott RN  Outcome: Progressing  Goal: Optimal Functional Ability  1/4/2025 1502 by Bessy Knott RN  Outcome: Met  1/4/2025 0743 by Bessy Knott RN  Outcome: Progressing  Goal: Optimal Nutrition Intake  1/4/2025 1502 by Bessy Knott RN  Outcome: Met  1/4/2025 0743 by Bessy Knott RN  Outcome: Progressing  Goal: Effective Oxygenation and Ventilation  1/4/2025 1502 by Bessy Knott RN  Outcome: Met  1/4/2025 0743 by Bessy Knott RN  Outcome: Progressing  Goal: Improved Sensorimotor Function  1/4/2025 1502 by Bessy Knott RN  Outcome: Met  1/4/2025 0743 by Bessy Knott RN  Outcome: Progressing  Goal: Safe and Effective Swallow  1/4/2025 1502 by Bessy Knott RN  Outcome: Met  1/4/2025 0743 by Bessy Knott RN  Outcome: Progressing  Goal: Effective Urinary Elimination  1/4/2025 1502 by Bessy Knott RN  Outcome: Met  1/4/2025 0743 by Bessy Knott RN  Outcome: Progressing     Problem: Fall Injury Risk  Goal: Absence of Fall and Fall-Related Injury  1/4/2025 1502 by Bessy Knott RN  Outcome: Met  1/4/2025 0743 by Bessy Knott RN  Outcome: Progressing     Problem: Adult Inpatient Plan of Care  Goal: Plan of Care Review  1/4/2025 1502 by Bessy Knott, RN  Outcome: Met  1/4/2025 0743 by Bessy Knott, RN  Outcome: Progressing  Goal: Patient-Specific  Goal (Individualized)  1/4/2025 1502 by Bessy Knott RN  Outcome: Met  1/4/2025 0743 by Bessy Knott RN  Outcome: Progressing  Goal: Absence of Hospital-Acquired Illness or Injury  1/4/2025 1502 by Bessy Knott RN  Outcome: Met  1/4/2025 0743 by Bessy Knott RN  Outcome: Progressing  Goal: Optimal Comfort and Wellbeing  1/4/2025 1502 by Bessy Knott RN  Outcome: Met  1/4/2025 0743 by Bessy Knott RN  Outcome: Progressing  Goal: Readiness for Transition of Care  1/4/2025 1502 by Bessy Knott RN  Outcome: Met  1/4/2025 0743 by Bessy Knott RN  Outcome: Progressing     Problem: Infection  Goal: Absence of Infection Signs and Symptoms  1/4/2025 1502 by Bessy Knott RN  Outcome: Met  1/4/2025 0743 by Bessy Knott RN  Outcome: Progressing     Problem: Diabetes Comorbidity  Goal: Blood Glucose Level Within Targeted Range  1/4/2025 1502 by Bessy Knott RN  Outcome: Met  1/4/2025 0743 by Bessy Knott RN  Outcome: Progressing     Problem: Oral Intake Inadequate  Goal: Improved Oral Intake  Outcome: Met

## 2025-01-04 NOTE — PLAN OF CARE
Cleared from case management standpoint.  CM sent inbasket referral to Dr.Amy Cheek,Neuro in [place].  Patient instructed to call PCP Donya De La Torre for follow up on AVS.  No further case management needs.     01/04/25 1314   Final Note   Assessment Type Final Discharge Note   Anticipated Discharge Disposition Home   What phone number can be called within the next 1-3 days to see how you are doing after discharge? [phone]   Hospital Resources/Appts/Education Provided Provided patient/caregiver with written discharge plan information   Post-Acute Status   Discharge Delays None known at this time

## 2025-01-04 NOTE — PT/OT/SLP EVAL
Physical Therapy Evaluation and Discharge Note    Patient Name:  Nell Smith   MRN:  [id number]    Recommendations:     Discharge Recommendations: No Therapy Indicated  Discharge Equipment Recommendations: none   Barriers to discharge: None    Assessment:     Nell Smith is a 64 y.o. female admitted with a medical diagnosis of Stroke-like symptoms. Patient is agreeable to participation with PT evaluation. She reports word finding difficulty for 1 minute prior to admission which resolved. She now feels back to baseline. She admits to a lot of stress related to family commitments. Strength and coordination testing WNL. She transfers independently. She ambulated 324' independently with no dizziness or loss of balance.  At this time, patient is functioning at their prior level of function and does not require further acute PT services.     Recent Surgery: * No surgery found *      Plan:     During this hospitalization, patient does not require further acute PT services.  Please re-consult if situation changes.      Subjective     Chief Complaint: family stress  Patient/Family Comments/goals: establish boundaries with her children and grandchildren   Pain/Comfort:  Pain Rating 1: 0/10    Patients cultural, spiritual, Confucianism conflicts given the current situation:      Living Environment:  Patient lives with spouse in a 2SH with 3 EDUARDO  Prior to admission, patients level of function was independent. She endorses 1 fall in the past year in which she slipped on a grandchild's toy. She denies any recent PT.  Equipment used at home: none.  DME owned (not currently used): none.  Upon discharge, patient will have assistance from family.    Objective:     Communicated with ELLIE Doherty prior to session.  Patient found sitting edge of bed with telemetry upon PT entry to room.    General Precautions: Standard, fall    Orthopedic Precautions:N/A   Braces: N/A  Respiratory Status: Room air    Exams:  Fine Motor Coordination:    -        Intact  Left hand, finger to nose, Right hand, finger to nose, Left hand thumb/finger opposition skills, Right hand thumb/finger opposition skills, Left hand, diadochokinesis skill , and Right hand, diadochokinesis skill   RLE Strength: WNL  LLE Strength: WNL    Functional Mobility:  Transfers:     Sit to Stand:  independence with no AD  Gait: 324' independently with no dizziness or loss of balance    AM-PAC 6 CLICK MOBILITY  Total Score:        Treatment and Education:  Patient was educated on the importance of OOB activity and functional mobility to negate negative effects of prolonged bed rest during hospitalization, safe transfers and ambulation, and D/C planning     AM-PAC 6 CLICK MOBILITY  Total Score:      Patient left sitting edge of bed with all lines intact, call button in reach, RN notified, and spouse present.    GOALS:   Multidisciplinary Problems       Physical Therapy Goals       Not on file                    History:     Past Medical History:   Diagnosis Date    Anxiety disorder, unspecified     Diabetes mellitus     Hypertension        Past Surgical History:   Procedure Laterality Date     SECTION       SECTION       SECTION       SECTION      WISDOM TOOTH EXTRACTION      at age 18; x 4       Time Tracking:     PT Received On: 25  PT Start Time: 1034     PT Stop Time: 1045  PT Total Time (min): 11 min     Billable Minutes: Evaluation 11      2025

## 2025-01-04 NOTE — HOSPITAL COURSE
64-year-old female presented to ED for eval of stroke-like symptoms. pMHx HTN, DM, anxiety, TIA.  Patient reported about 1 hour prior to arrival in ED she started having difficulty with her speech. Symptoms have resolved.  Evaluated by stroke team provided.  MRI did not show any new areas of infarction in his being diagnosed with TIA.  At this time patient is stabilized for discharge and recommending starting high-intensity statin with LDL goal less than 70 and restarting aspirin and Plavix for 21 days and if outpatient Neurology agrees then transitioning to Plavix monotherapy alone since she had a TIA while on baby aspirin.  We have given a referral to a new neurologist since her previous neurologist has moved.  CTA H/N impression with negative for large vessel intracranial arterial occlusion; short-segment areas of > 50% luminal stenosis involving the supraclinoid internal carotid arteries bilaterally; hypodense right thyroid lobe nodule, for which outpatient thyroid sonography is recommended.

## 2025-01-04 NOTE — PT/OT/SLP EVAL
Speech Language Pathology Evaluation  Cognitive/Bedside Swallow    Patient Name:  Nell Smith   MRN:  [id number]  Admitting Diagnosis: Stroke-like symptoms    Recommendations:                  General Recommendations:  Cognitive-linguistic therapy  Diet recommendations:  Regular Diet - IDDSI Level 7, Thin   Aspiration Precautions: Standard aspiration precautions   General Precautions: Standard,    Communication strategies:  none    Assessment:     Nell Smith is a 64 y.o. female with an SLP diagnosis of  mild delayed memory deficits .  Recommend outpatient cognitive-linguistic tx.  Regular textures & liquids..    History:     Past Medical History:   Diagnosis Date    Anxiety disorder, unspecified     Diabetes mellitus     Hypertension        Past Surgical History:   Procedure Laterality Date     SECTION       SECTION       SECTION       SECTION      WISDOM TOOTH EXTRACTION      at age 18; x 4       Social History: Patient lives with spouse.    Prior Intubation HX:  none this admission    Modified Barium Swallow: none in EPic    Chest X-Rays: none recent    MRI Brain - Punctate focus of mild diffusion restriction left centrum semiovale with normalized ADC, possible recent/subacute infarct.     Minimal supratentorial white matter T2/flair hyperintense signal foci suggesting sequela of chronic small vessel ischemic change    Prior diet: regular textures & liquids.    Subjective     I home schooled for years  Patient goals: home     Objective:     Cognitive Status:    Mild delayed memory deficit.      Francisco Cognitive Assessment (MoCA) score - 26 out of 30 indicating intact cognitive-linguistic status    Receptive Language/Comprehension:  intact    Pragmatics:  intact    Expressive Language: intact      Motor Speech: WFL    Voice: WFL    Visual-Spatial: WFL    Reading: WFL     Oral Musculature Evaluation  Oral Musculature: WNL  Dentition: present and  adequate  Secretion Management: adequate  Mucosal Quality: good  Mandibular Strength and Mobility: WNL  Oral Labial Strength and Mobility: WNL  Lingual Strength and Mobility: WNL  Velar Elevation: WNL  Buccal Strength and Mobility: WNL  Volitional Cough: adequate  Voice Prior to PO Intake: clear, no dysarthria    Bedside Swallow Eval:   Consistencies Assessed:  Thin liquids via cup  Solids nuts      Oral Phase:   WNL    Pharyngeal Phase:   no overt clinical signs/symptoms of aspiration  no overt clinical signs/symptoms of pharyngeal dysphagia    Compensatory Strategies  None    Treatment: education re imprressions and recommendations    Goals:   Multidisciplinary Problems       SLP Goals       Not on file                    Plan:     Patient to be seen:      Plan of Care expires:     Plan of Care reviewed with:  patient, spouse   SLP Follow-Up:  No       Discharge recommendations:  Therapy Intensity Recommendations at Discharge: No Therapy Indicated   Barriers to Discharge:  None    Time Tracking:     SLP Treatment Date:   01/04/25  Speech Start Time:  1255  Speech Stop Time:  1334     Speech Total Time (min):  39 min    Billable Minutes: Eval 29  and Eval Swallow and Oral Function 10    01/04/2025

## 2025-01-04 NOTE — PT/OT/SLP EVAL
Occupational Therapy   Evaluation and Discharge Note    Name: Nell Smith  MRN: [id number]  Admitting Diagnosis: TIA (transient ischemic attack)  Recent Surgery: * No surgery found *      Recommendations:     Discharge Recommendations: No Therapy Indicated  Discharge Equipment Recommendations: none  Barriers to discharge:  None    Assessment:     Nell Smith is a 64 y.o. female with a medical diagnosis of TIA (transient ischemic attack). At this time, patient is functioning at their prior level of function and does not require further acute OT services.     Plan:     During this hospitalization, patient does not require further acute OT services.  Please re-consult if situation changes.    Plan of Care Reviewed with: patient, spouse    Subjective     Chief Complaint: none  Patient/Family Comments/goals: I am doing fine.    Occupational Profile:  Living Environment: pt lives with spouse 2SH w/ 3 steps and no arils , all living quarters are downstairs; WIS and t/s available.   Previous level of function: Indep, no AD, drives, cooks, cleans  Roles and Routines: Wife, Mother, Grandmother  Equipment Used at home: none  Assistance upon Discharge: spouse    Pain/Comfort:  Pain Rating 1: 0/10  Pain Rating Post-Intervention 1: 0/10    Patients cultural, spiritual, Gnosticist conflicts given the current situation: no    Objective:     Communicated with: nurse prior to session.  Patient found sitting edge of bed with telemetry upon OT entry to room.    General Precautions: Standard, diabetic, aspiration  Orthopedic Precautions: N/A  Braces: N/A  Respiratory Status: Room air     Occupational Performance:    Bed Mobility:    Patient completed Supine to Sit with independence  Patient completed Sit to Supine with independence    Functional Mobility/Transfers:  Patient completed Sit <> Stand Transfer with independence  with  no assistive device   Functional Mobility: Indep    Activities of Daily Living:  Feeding:  independence  .  Grooming: independence .  Upper Body Dressing: independence .  Lower Body Dressing: independence .  Toileting: independence .    Cognitive/Visual Perceptual:  Cognitive/Psychosocial Skills:     -       Oriented to: Person, Place, Time, and Situation   -       Follows Commands/attention:Follows multistep  commands  -       Communication: clear/fluent  -       Memory: No Deficits noted  -       Safety awareness/insight to disability: intact   -       Mood/Affect/Coping skills/emotional control: Anxious  Visual/Perceptual:      -Intact glasses    Physical Exam:  Balance:    -       sitting and standing; good/excellent  Postural examination/scapula alignment:    -       No postural abnormalities identified  Skin integrity: Visible skin intact  Sensation:    -       Intact  Motor Planning:    -       intact  Dominant hand:    -       right  Upper Extremity Range of Motion:     -       Right Upper Extremity: WNL  -       Left Upper Extremity: WNL  Upper Extremity Strength:    -       Right Upper Extremity: WNL  -       Left Upper Extremity: WNL   Strength:    -       Right Upper Extremity: WNL  -       Left Upper Extremity: WNL  Fine Motor Coordination:    -       Intact  Gross motor coordination:   WFL  Neurological:    -       normal tone    AMPAC 6 Click ADL:  AMPAC Total Score: 24    Treatment & Education:  Purpose of OT  BEFAST s/s of stroke. They verbalized understanding  All questions/concerns addressed within scope.     Patient left sitting edge of bed with all lines intact and call button in reach    GOALS:   Multidisciplinary Problems       Occupational Therapy Goals       Not on file                    History:     Past Medical History:   Diagnosis Date    Anxiety disorder, unspecified     Diabetes mellitus     Hypertension          Past Surgical History:   Procedure Laterality Date     SECTION       SECTION       SECTION       SECTION      WISDOM TOOTH  EXTRACTION      at age 18; x 4       Time Tracking:     OT Date of Treatment: 01/04/25  OT Start Time: 1131  OT Stop Time: 1144  OT Total Time (min): 13 min    Billable Minutes:Evaluation 13    1/4/2025

## 2025-01-04 NOTE — NURSING
Discharge instructions given to PT. Pt stated understood. Removed IV and telly box. Pt being DC home with .

## 2025-01-04 NOTE — DISCHARGE SUMMARY
[facility]  Discharge Summary      Patient Name: Nell Smith  MRN: [id number]  [facility]: [id number]  Patient Class: OP- Observation  Admission Date: 1/3/2025  Hospital Length of Stay: 0 days  Discharge Date and Time:  01/04/2025 2:24 PM  Attending Physician: Miguel Ángel Ackerman DO   Discharging Provider: Miguel Ángel Ackerman DO  Primary Care Provider: Deepti Kennedy MD    Primary Care Team: Networked reference to record PCT     HPI:   64-year-old female presented to ED for eval of stroke-like symptoms. pMHx HTN, DM, anxiety, TIA.  Patient reported about 1 hour prior to arrival in ED she started having difficulty with her speech, states she did not feel like her words were coming out correctly despite knowing what she wanted to say.  By the time patient arrived in ED patient reported symptoms completely resolved.  Denies other focal neurological deficits.  Patient reports she has been under increased stress lately 2/2 holiday season. Denies chest pain, shortness of breath.  Denies fever, chills.  Denies abdominal pain, nausea, vomiting, changes in bowel habits.  Patient reports history of TIA, was on DAPT for 30 days and now takes baby aspirin daily, reports compliance with meds.  CTH impression without acute abnormality. CTA H/N impression with negative for large vessel intracranial arterial occlusion; short-segment areas of > 50% luminal stenosis involving the supraclinoid internal carotid arteries bilaterally; hypodense right thyroid lobe nodule, for which outpatient thyroid sonography is recommended.  Admit to hospital medicine for further eval.    * No surgery found *      Hospital Course:   64-year-old female presented to ED for eval of stroke-like symptoms. pMHx HTN, DM, anxiety, TIA.  Patient reported about 1 hour prior to arrival in ED she started having difficulty with her speech. Symptoms have resolved.  Evaluated by stroke team provided.  MRI did not show any new areas of  infarction in his being diagnosed with TIA.  At this time patient is stabilized for discharge and recommending starting high-intensity statin with LDL goal less than 70 and restarting aspirin and Plavix for 21 days and if outpatient Neurology agrees then transitioning to Plavix monotherapy alone since she had a TIA while on baby aspirin.  We have given a referral to a new neurologist since her previous neurologist has moved.  CTA H/N impression with negative for large vessel intracranial arterial occlusion; short-segment areas of > 50% luminal stenosis involving the supraclinoid internal carotid arteries bilaterally; hypodense right thyroid lobe nodule, for which outpatient thyroid sonography is recommended.          Goals of Care Treatment Preferences:  Code Status: Full Code      SDOH Screening:  The patient declined to be screened for utility difficulties, food insecurity, transport difficulties, housing insecurity, and interpersonal safety, so no concerns could be identified this admission.     Consults:   Consults (From admission, onward)          Status Ordering Provider     Consult to Telemedicine - Acute Stroke  Once        Provider:  (Not yet assigned)    Acknowledged CAROLYN CHAMBERS     Inpatient consult to Registered Dietitian/Nutritionist  Once        Provider:  (Not yet assigned)    Completed CAROLYN CHAMBERS     IP consult to case management/social work  Once        Provider:  (Not yet assigned)    Acknowledged CAROLYN CHAMBERS            * TIA (transient ischemic attack)    Antithrombotics for secondary stroke prevention: Antiplatelets: Aspirin: 81 mg daily    Statins for secondary stroke prevention and hyperlipidemia, if present:   Statins: Atorvastatin- 40 mg daily    Aggressive risk factor modification: HTN, DM     Rehab efforts: The patient has been evaluated by a stroke team provider and the therapy needs have been fully considered based off the presenting complaints and exam findings. The  following therapy evaluations are needed: PT evaluate and treat, OT evaluate and treat, SLP evaluate and treat    Diagnostics ordered/pending: CT scan of head without contrast to asses brain parenchyma, CTA Head to assess vasculature , CTA Neck/Arch to assess vasculature, HgbA1C to assess blood glucose levels, Lipid Profile to assess cholesterol levels, MRI head without contrast to assess brain parenchyma, TTE to assess cardiac function/status , TSH to assess thyroid function    VTE prophylaxis: Mechanical prophylaxis: Place SCDs    BP parameters: TIA: SBP <220 until imaging confirmation of no infarct      Neuro checks    Thyroid nodule  CTA H/N impression with hypodense right thyroid lobe nodule  Needs outpatient follow up recs    Carotid stenosis  See above  CTA H/N 1/3/24 impression with short-segment areas of > 50% luminal stenosis involving the supraclinoid internal carotid arteries bilaterally   Consult tele neurology for recs    Type 2 diabetes mellitus without complication, with long-term current use of insulin  Patient's FSGs are controlled on current medication regimen.  Last A1c reviewed-   Lab Results   Component Value Date    HGBA1C 4.1 (L) 04/03/2024     Most recent fingerstick glucose reviewed-   Recent Labs   Lab 01/03/25  1150   POCTGLUCOSE 150*     Current correctional scale  Low  Maintain anti-hyperglycemic dose as follows-   Antihyperglycemics (From admission, onward)      Start     Stop Route Frequency Ordered    01/04/25 0900  insulin glargine U-100 (Lantus) pen 14 Units         -- SubQ Daily 01/03/25 1546    01/03/25 1646  insulin aspart U-100 pen 0-5 Units         -- SubQ Before meals & nightly PRN 01/03/25 1546          Hold Oral hypoglycemics while patient is in the hospital.       Final Active Diagnoses:    Diagnosis Date Noted POA    PRINCIPAL PROBLEM:  TIA (transient ischemic attack) [G45.9] 01/03/2025 Yes    Carotid stenosis [I65.29] 01/03/2025 Yes    Thyroid nodule [E04.1] 01/03/2025  Yes    Type 2 diabetes mellitus without complication, with long-term current use of insulin [E11.9, Z79.4] 04/08/2024 Not Applicable      Problems Resolved During this Admission:       Discharged Condition: good    Disposition: Home or Self Care    Follow Up:   Follow-up Information       Donya De La Torre MD. Call.    Specialty: Internal Medicine  Why: Call to schedule follow up appointment within 7 days  Contact information:  [address]  [facility] [address]  [phone]               Veronica Cheek MD. Call.    Specialty: Neurology  Why: Please call ofice on Monday to get follow up appointment.  Contact information:  [address]  [facility] [address]  [phone]                           Patient Instructions:      Ambulatory referral/consult to Neurology   Standing Status: Future   Referral Priority: Routine Referral Type: Consultation   Referral Reason: Specialty Services Required   Requested Specialty: Neurology   Number of Visits Requested: 1     Notify your health care provider if you experience any of the following:  temperature >100.4     Notify your health care provider if you experience any of the following:  persistent nausea and vomiting or diarrhea     Notify your health care provider if you experience any of the following:  severe uncontrolled pain     Notify your health care provider if you experience any of the following:  persistent dizziness, light-headedness, or visual disturbances     Activity as tolerated       Significant Diagnostic Studies: Labs: BMP:   Recent Labs   Lab 01/03/25  1158 01/04/25  0429   * 120*    141   K 4.6 3.8    108   CO2 25 26   BUN 21 15   CREATININE 0.8 0.7   CALCIUM 9.8 9.1   MG  --  2.4   , CMP   Recent Labs   Lab 01/03/25  1158 01/04/25  0429    141   K 4.6 3.8    108   CO2 25 26   * 120*   BUN 21 15   CREATININE 0.8 0.7   CALCIUM 9.8 9.1   PROT 7.5 6.7   ALBUMIN 4.9 4.4   BILITOT 1.1* 1.0   ALKPHOS 64 56   AST 18 17  "  ALT 12 10   ANIONGAP 6* 7*   , CBC   Recent Labs   Lab 01/03/25  1158 01/04/25  0429   WBC 4.90 5.87   HGB 10.0* 9.8*   HCT 30.3* 29.2*    248   , INR   Lab Results   Component Value Date    INR 0.9 01/04/2025    INR 0.9 01/03/2025    INR 0.9 04/08/2024   , Lipid Panel   Lab Results   Component Value Date    CHOL 198 01/04/2025    HDL 48 01/04/2025    LDLCALC 122.2 01/04/2025    TRIG 139 01/04/2025    CHOLHDL 24.2 01/04/2025   , Troponin No results for input(s): "TROPONINI" in the last 168 hours., A1C:   Recent Labs   Lab 01/03/25  1158 01/04/25  0429   HGBA1C <4.0* <4.0*   , and All labs within the past 24 hours have been reviewed  Radiology:     Cardiac Graphics: Echocardiogram: 2D echo with color flow doppler: No results found for this or any previous visit. and Transthoracic echo (TTE) complete (Cupid Only):   Results for orders placed or performed during the hospital encounter of 04/08/24   Echo Saline Bubble? Yes   Result Value Ref Range    BSA 1.67 m2    Polanco's Biplane MOD Ejection Fraction 36 %    LVOT stroke volume 60.36 cm3    LVIDd 4.31 3.5 - 6.0 cm    LV Systolic Volume 39.10 mL    LV Systolic Volume Index 23.7 mL/m2    LVIDs 3.14 2.1 - 4.0 cm    LV Diastolic Volume 83.50 mL    LV Diastolic Volume Index 50.61 mL/m2    IVS 0.93 0.6 - 1.1 cm    LVOT diameter 1.90 cm    LVOT area 2.8 cm2    FS 27 (A) 28 - 44 %    Left Ventricle Relative Wall Thickness 0.51 cm    PW 1.09 0.6 - 1.1 cm    LV mass 145.01 g    LV Mass Index 88 g/m2    MV Peak E Ruddy 0.65 m/s    TDI LATERAL 0.08 m/s    TDI SEPTAL 0.05 m/s    E/E' ratio 10.00 m/s    MV Peak A Ruddy 1.19 m/s    TR Max Ruddy 2.10 m/s    E/A ratio 0.55     E wave deceleration time 158.00 msec    LV SEPTAL E/E' RATIO 13.00 m/s    LV LATERAL E/E' RATIO 8.13 m/s    LVOT peak ruddy 1.08 m/s    Left Ventricular Outflow Tract Mean Velocity 0.73 cm/s    Left Ventricular Outflow Tract Mean Gradient 3.00 mmHg    RVDD 2.20 cm    RV S' 21.90 cm/s    TAPSE 2.83 cm    AV " mean gradient 4 mmHg    AV peak gradient 8 mmHg    Ao peak christ 1.45 m/s    Ao VTI 29.30 cm    LVOT peak VTI 21.30 cm    AV valve area 2.06 cm²    AV Velocity Ratio 0.74     AV index (prosthetic) 0.73     DONNIE by Velocity Ratio 2.11 cm²    Mr max christ 3.66 m/s    MV mean gradient 3 mmHg    MV peak gradient 7 mmHg    MV valve area by continuity eq 2.53 cm2    MV VTI 23.9 cm    Triscuspid Valve Regurgitation Peak Gradient 18 mmHg    PV PEAK VELOCITY 1.14 m/s    PV peak gradient 5 mmHg    Pulmonary Valve Mean Velocity 0.83 m/s    Ao root annulus 3.40 cm    IVC diameter 1.03 cm    Mean e' 0.07 m/s    ZLVIDS 0.71     ZLVIDD -0.72     AORTIC VALVE CUSP SEPERATION 1.70 cm    TV resting pulmonary artery pressure 21 mmHg    RV TB RVSP 5 mmHg    Est. RA pres 3 mmHg    Narrative      Left Ventricle: The left ventricle is normal in size. Normal wall   thickness. There is concentric remodeling. Mild global hypokinesis   present. There is low normal systolic function with a visually estimated   ejection fraction of 50 - 55%. There is normal diastolic function.    Right Ventricle: Normal right ventricular cavity size. Wall thickness   is normal. Right ventricle wall motion  is normal. Systolic function is   normal.    IVC/SVC: Normal venous pressure at 3 mmHg.         Pending Diagnostic Studies:       Procedure Component Value Units Date/Time    Echo Saline Bubble? Yes [[id number]]     Order Status: Sent Lab Status: No result     HIV 1/2 Ag/Ab (4th Gen) [[id number]]     Order Status: Sent Lab Status: No result     Specimen: Blood     Hepatitis C Antibody [[id number]]     Order Status: Sent Lab Status: No result     Specimen: Blood            Medications:  Reconciled Home Medications:      Medication List        START taking these medications      atorvastatin 40 MG tablet  Commonly known as: LIPITOR  Take 1 tablet (40 mg total) by mouth once daily.  Start taking on: January 5, 2025            CONTINUE taking these medications       amLODIPine 10 MG tablet  Commonly known as: NORVASC  Take 1 tablet (10 mg total) by mouth once daily.     aspirin 81 MG EC tablet  Commonly known as: ECOTRIN  Take 1 tablet (81 mg total) by mouth once daily.     clopidogreL 75 mg tablet  Commonly known as: PLAVIX  Take 1 tablet (75 mg total) by mouth once daily.     conjugated estrogens vaginal cream  Commonly known as: PREMARIN  Place 1 g vaginally daily as needed.     losartan 100 MG tablet  Commonly known as: COZAAR  Take 100 mg by mouth once daily.     metFORMIN 500 MG ER 24hr tablet  Commonly known as: GLUCOPHAGE-XR  Take 500 mg by mouth 2 (two) times a day.     sertraline 100 MG tablet  Commonly known as: ZOLOFT  Take 100 mg by mouth once daily.     TRESIBA FLEXTOUCH U-100 100 unit/mL (3 mL) insulin pen  Generic drug: insulin degludec  Inject 14 Units into the skin once daily.            STOP taking these medications      insulin aspart U-100 100 unit/mL (3 mL) Inpn pen  Commonly known as: NovoLOG     RED YEAST RICE ORAL              Indwelling Lines/Drains at time of discharge:   Lines/Drains/Airways       None                   Time spent on the discharge of patient: 35 minutes         Miguel Ángel Ackerman DO  Department of Hospital Medicine  [facility]

## 2025-01-05 LAB
OHS QRS DURATION: 74 MS
OHS QTC CALCULATION: 435 MS

## 2025-08-27 ENCOUNTER — HOSPITAL ENCOUNTER (EMERGENCY)
Facility: HOSPITAL | Age: 65
Discharge: HOME | End: 2025-08-28
Attending: STUDENT IN AN ORGANIZED HEALTH CARE EDUCATION/TRAINING PROGRAM
Payer: MEDICAID

## 2025-08-27 DIAGNOSIS — I10 HYPERTENSION: ICD-10-CM

## 2025-08-27 DIAGNOSIS — I10 HYPERTENSION, UNSPECIFIED TYPE: Primary | ICD-10-CM

## 2025-08-27 DIAGNOSIS — R20.0 NUMBNESS OF FINGERS: ICD-10-CM

## 2025-08-27 LAB
CREAT SERPL-MCNC: 1 MG/DL (ref 0.5–1.4)
POCT GLUCOSE: 144 MG/DL (ref 70–110)
SAMPLE: NORMAL

## 2025-08-27 PROCEDURE — 25000003 PHARM REV CODE 250

## 2025-08-27 PROCEDURE — 82565 ASSAY OF CREATININE: CPT

## 2025-08-27 PROCEDURE — 82962 GLUCOSE BLOOD TEST: CPT

## 2025-08-27 PROCEDURE — 99284 EMERGENCY DEPT VISIT MOD MDM: CPT

## 2025-08-27 RX ORDER — AMLODIPINE BESYLATE 10 MG/1
10 TABLET ORAL DAILY
Qty: 90 TABLET | Refills: 0 | Status: SHIPPED | OUTPATIENT
Start: 2025-08-27 | End: 2025-08-27

## 2025-08-27 RX ORDER — AMLODIPINE BESYLATE 10 MG/1
10 TABLET ORAL DAILY
Qty: 30 TABLET | Refills: 0 | Status: SHIPPED | OUTPATIENT
Start: 2025-08-27 | End: 2025-09-26

## 2025-08-27 RX ORDER — AMLODIPINE BESYLATE 10 MG/1
10 TABLET ORAL DAILY
Qty: 90 TABLET | Refills: 0 | Status: SHIPPED | OUTPATIENT
Start: 2025-08-27 | End: 2025-08-27 | Stop reason: CLARIF

## 2025-08-27 RX ORDER — AMLODIPINE BESYLATE 10 MG/1
10 TABLET ORAL DAILY
Qty: 30 TABLET | Refills: 2 | Status: SHIPPED | OUTPATIENT
Start: 2025-08-27 | End: 2025-08-27 | Stop reason: CLARIF

## 2025-08-27 RX ORDER — AMLODIPINE BESYLATE 5 MG/1
10 TABLET ORAL
Status: COMPLETED | OUTPATIENT
Start: 2025-08-27 | End: 2025-08-27

## 2025-08-27 RX ADMIN — AMLODIPINE BESYLATE 10 MG: 5 TABLET ORAL at 10:08

## 2025-08-28 ENCOUNTER — NURSE TRIAGE (OUTPATIENT)
Dept: ADMINISTRATIVE | Facility: CLINIC | Age: 65
End: 2025-08-28
Payer: MEDICAID

## 2025-08-28 VITALS
OXYGEN SATURATION: 100 % | DIASTOLIC BLOOD PRESSURE: 87 MMHG | TEMPERATURE: 98 F | WEIGHT: 140 LBS | HEIGHT: 63 IN | BODY MASS INDEX: 24.8 KG/M2 | RESPIRATION RATE: 19 BRPM | HEART RATE: 69 BPM | SYSTOLIC BLOOD PRESSURE: 208 MMHG